# Patient Record
Sex: FEMALE | Race: WHITE | NOT HISPANIC OR LATINO | Employment: OTHER | URBAN - METROPOLITAN AREA
[De-identification: names, ages, dates, MRNs, and addresses within clinical notes are randomized per-mention and may not be internally consistent; named-entity substitution may affect disease eponyms.]

---

## 2021-08-06 ENCOUNTER — APPOINTMENT (OUTPATIENT)
Dept: RADIOLOGY | Age: 61
End: 2021-08-06
Attending: PHYSICIAN ASSISTANT
Payer: COMMERCIAL

## 2021-08-06 ENCOUNTER — OFFICE VISIT (OUTPATIENT)
Dept: URGENT CARE | Age: 61
End: 2021-08-06
Payer: COMMERCIAL

## 2021-08-06 VITALS
WEIGHT: 230 LBS | HEIGHT: 66 IN | DIASTOLIC BLOOD PRESSURE: 88 MMHG | TEMPERATURE: 98.2 F | HEART RATE: 70 BPM | OXYGEN SATURATION: 99 % | SYSTOLIC BLOOD PRESSURE: 172 MMHG | BODY MASS INDEX: 36.96 KG/M2 | RESPIRATION RATE: 18 BRPM

## 2021-08-06 DIAGNOSIS — S86.911A KNEE STRAIN, RIGHT, INITIAL ENCOUNTER: Primary | ICD-10-CM

## 2021-08-06 DIAGNOSIS — R52 PAIN: ICD-10-CM

## 2021-08-06 PROCEDURE — 73564 X-RAY EXAM KNEE 4 OR MORE: CPT

## 2021-08-06 PROCEDURE — 99213 OFFICE O/P EST LOW 20 MIN: CPT | Performed by: PHYSICIAN ASSISTANT

## 2021-08-06 RX ORDER — NAPROXEN 500 MG/1
500 TABLET ORAL 2 TIMES DAILY WITH MEALS
COMMUNITY
End: 2022-01-17

## 2021-08-06 RX ORDER — ATORVASTATIN CALCIUM 20 MG/1
20 TABLET, FILM COATED ORAL DAILY
COMMUNITY
End: 2021-09-29

## 2021-08-06 RX ORDER — VALSARTAN AND HYDROCHLOROTHIAZIDE 80; 12.5 MG/1; MG/1
1 TABLET, FILM COATED ORAL DAILY
COMMUNITY
End: 2022-01-14 | Stop reason: SDUPTHER

## 2021-08-06 RX ORDER — LEVOTHYROXINE SODIUM 175 UG/1
175 TABLET ORAL DAILY
COMMUNITY
End: 2021-12-06 | Stop reason: SDUPTHER

## 2021-08-06 NOTE — PROGRESS NOTES
[facility]        NAME: Sherry Chow is a 57 y o  female  : 1960    MRN: [id number]  DATE: 2021  TIME: 7:44 PM    Assessment and Plan   Knee strain, right, initial encounter [S86 911A]  1  Knee strain, right, initial encounter  XR knee 4+ vw right injury    Ambulatory referral to Orthopedic Surgery         Patient Instructions       Follow up with PCP in 3-5 days  Proceed to  ER if symptoms worsen  Chief Complaint     Chief Complaint   Patient presents with    Knee Pain     right knee pain began with fall on     injured again today  History of Present Illness        Patient for evaluation of pain swelling in her right knee  Patient states that on  she was coming down the steps and her knee buckled  She did not fall on the knee  She states that was little bit painful but started to improve  Then she came down hard on her heel during her knee causing more pain  Review of Systems   Review of Systems   Constitutional: Negative  Musculoskeletal: Positive for arthralgias, gait problem and joint swelling  Skin: Negative            Current Medications       Current Outpatient Medications:     atorvastatin (LIPITOR) 20 mg tablet, Take 20 mg by mouth daily, Disp: , Rfl:     levothyroxine 175 mcg tablet, Take 175 mcg by mouth daily, Disp: , Rfl:     naproxen (NAPROSYN) 500 mg tablet, Take 500 mg by mouth 2 (two) times a day with meals, Disp: , Rfl:     valsartan-hydrochlorothiazide (DIOVAN-HCT) 80-12 5 MG per tablet, Take 1 tablet by mouth daily, Disp: , Rfl:     Current Allergies     Allergies as of 2021    (Not on File)            The following portions of the patient's history were reviewed and updated as appropriate: allergies, current medications, past family history, past medical history, past social history, past surgical history and problem list      Past Medical History:   Diagnosis Date    Arthritis     Cancer ([facility] [address] )     Disease of thyroid gland     Hypertension        Past Surgical History:   Procedure Laterality Date    BREAST LUMPECTOMY      VEIN SURGERY         No family history on file  Medications have been verified  Objective   BP (!) 172/88   Pulse 70   Temp 98 2 °F (36 8 °C)   Resp 18   Ht 5' 6" (1 676 m)   Wt 104 kg (230 lb)   SpO2 99%   BMI 37 12 kg/m²   No LMP recorded  Physical Exam     Physical Exam  Vitals and nursing note reviewed  Constitutional:       General: She is not in acute distress  Appearance: Normal appearance  She is well-developed  She is not ill-appearing, toxic-appearing or diaphoretic  HENT:      Head: Normocephalic and atraumatic  Eyes:      Extraocular Movements: Extraocular movements intact  Conjunctiva/sclera: Conjunctivae normal       Pupils: Pupils are equal, round, and reactive to light  Musculoskeletal:      Comments: Range of motion of the right knee intact but limited  Patient has tenderness and swelling along the medial aspect of the knee  No ecchymosis  No erythema  No warmth  No laxity  Patient does have pain with stress of the MCL  Negative anterior posterior drawer sign  Skin:     General: Skin is warm and dry  Findings: No rash  Neurological:      General: No focal deficit present  Mental Status: She is alert and oriented to person, place, and time  Psychiatric:         Mood and Affect: Mood normal          Behavior: Behavior normal          Thought Content:  Thought content normal          Judgment: Judgment normal          X-ray shows no acute fractures or findings

## 2021-08-06 NOTE — PATIENT INSTRUCTIONS
Rest injured body part  Ice 10-15 minutes off and on every 3-4 hours while awake for 48 hours after injury  After 48 hours you may start using warm compresses if appropriate  Compression use Ace wrap or compressive sleeve for support as needed  DO NOT wear to bed  Elevate injured body part as able to help decrease pain and swelling  Follow-up with orthopedics for further evaluation    [phone]

## 2021-09-29 ENCOUNTER — OFFICE VISIT (OUTPATIENT)
Dept: FAMILY MEDICINE CLINIC | Facility: CLINIC | Age: 61
End: 2021-09-29
Payer: COMMERCIAL

## 2021-09-29 VITALS
HEIGHT: 66 IN | HEART RATE: 76 BPM | WEIGHT: 240 LBS | DIASTOLIC BLOOD PRESSURE: 80 MMHG | BODY MASS INDEX: 38.57 KG/M2 | TEMPERATURE: 98.4 F | SYSTOLIC BLOOD PRESSURE: 132 MMHG

## 2021-09-29 DIAGNOSIS — E78.00 HYPERCHOLESTEREMIA: ICD-10-CM

## 2021-09-29 DIAGNOSIS — M25.561 CHRONIC PAIN OF BOTH KNEES: ICD-10-CM

## 2021-09-29 DIAGNOSIS — Z00.00 ANNUAL PHYSICAL EXAM: Primary | ICD-10-CM

## 2021-09-29 DIAGNOSIS — Z11.59 ENCOUNTER FOR HEPATITIS C SCREENING TEST FOR LOW RISK PATIENT: ICD-10-CM

## 2021-09-29 DIAGNOSIS — Z12.11 COLON CANCER SCREENING: ICD-10-CM

## 2021-09-29 DIAGNOSIS — G89.29 CHRONIC PAIN OF BOTH KNEES: ICD-10-CM

## 2021-09-29 DIAGNOSIS — M25.562 CHRONIC PAIN OF BOTH KNEES: ICD-10-CM

## 2021-09-29 DIAGNOSIS — R73.03 PREDIABETES: ICD-10-CM

## 2021-09-29 DIAGNOSIS — N39.41 URGE INCONTINENCE: ICD-10-CM

## 2021-09-29 DIAGNOSIS — R92.2 DENSE BREASTS: ICD-10-CM

## 2021-09-29 DIAGNOSIS — I99.9 VASCULAR DISEASE: ICD-10-CM

## 2021-09-29 DIAGNOSIS — Z85.850 HISTORY OF THYROID CANCER: ICD-10-CM

## 2021-09-29 DIAGNOSIS — J30.1 SEASONAL ALLERGIC RHINITIS DUE TO POLLEN: ICD-10-CM

## 2021-09-29 DIAGNOSIS — Z12.31 ENCOUNTER FOR SCREENING MAMMOGRAM FOR MALIGNANT NEOPLASM OF BREAST: ICD-10-CM

## 2021-09-29 DIAGNOSIS — G62.9 NEUROPATHY: ICD-10-CM

## 2021-09-29 DIAGNOSIS — E89.0 POSTOPERATIVE HYPOTHYROIDISM: ICD-10-CM

## 2021-09-29 DIAGNOSIS — M12.9 ARTHROPATHY, MULTIPLE SITES: ICD-10-CM

## 2021-09-29 DIAGNOSIS — I10 ESSENTIAL HYPERTENSION: ICD-10-CM

## 2021-09-29 PROBLEM — R51.9 HEADACHE: Status: ACTIVE | Noted: 2018-11-01

## 2021-09-29 PROBLEM — R51.9 HEADACHE: Status: RESOLVED | Noted: 2018-11-01 | Resolved: 2021-09-29

## 2021-09-29 PROCEDURE — 3725F SCREEN DEPRESSION PERFORMED: CPT | Performed by: FAMILY MEDICINE

## 2021-09-29 PROCEDURE — 1036F TOBACCO NON-USER: CPT | Performed by: FAMILY MEDICINE

## 2021-09-29 PROCEDURE — 99204 OFFICE O/P NEW MOD 45 MIN: CPT | Performed by: FAMILY MEDICINE

## 2021-09-29 PROCEDURE — 99386 PREV VISIT NEW AGE 40-64: CPT | Performed by: FAMILY MEDICINE

## 2021-09-29 PROCEDURE — 3008F BODY MASS INDEX DOCD: CPT | Performed by: FAMILY MEDICINE

## 2021-09-29 RX ORDER — ATORVASTATIN CALCIUM 10 MG/1
10 TABLET, FILM COATED ORAL DAILY
COMMUNITY
End: 2022-01-14 | Stop reason: SDUPTHER

## 2021-09-29 RX ORDER — CETIRIZINE HYDROCHLORIDE 10 MG/1
TABLET ORAL AS NEEDED
COMMUNITY

## 2021-09-29 RX ORDER — CELECOXIB 200 MG/1
200 CAPSULE ORAL 2 TIMES DAILY
COMMUNITY

## 2021-09-29 RX ORDER — SOLIFENACIN SUCCINATE 5 MG/1
5 TABLET, FILM COATED ORAL DAILY
Qty: 30 TABLET | Refills: 2 | Status: SHIPPED | OUTPATIENT
Start: 2021-09-29 | End: 2021-11-29 | Stop reason: SDUPTHER

## 2021-09-29 RX ORDER — FLUTICASONE PROPIONATE 50 MCG
SPRAY, SUSPENSION (ML) NASAL AS NEEDED
COMMUNITY
End: 2022-04-18 | Stop reason: SDUPTHER

## 2021-09-29 NOTE — PATIENT INSTRUCTIONS

## 2021-09-29 NOTE — ASSESSMENT & PLAN NOTE
Chronic, uncontrolled  Patient uses Celebrex and naproxen   Obtain labs as ordered  Referral to sports medicine for back pain and knee pain

## 2021-09-29 NOTE — PROGRESS NOTES
Wendy Honeycutt 1960 female MRN: [id number]  [address]    Visit to Establish Care: Family Medicine    Assessment/Plan     Postoperative hypothyroidism  Obtain labs  Continue levothyroxine 175mcg QD  Obtain labs as ordered    Allergic rhinitis  Chronic, stable  Continue Flonase and Zyrtec    Hypertension  Chronic, controlled  Continue valsartan-HCTZ 80-12 5mg QD    Arthropathy, multiple sites  Chronic, uncontrolled  Patient uses Celebrex and naproxen   Obtain labs as ordered  Referral to sports medicine for back pain and knee pain    Urge incontinence  Resume Vesicare     Hypercholesteremia  Obtain labs  Continue Lipitor 10mg QD    Chel was seen today for new patient visit and physical exam     Diagnoses and all orders for this visit:    Annual physical exam  -     Lipid Panel with Direct LDL reflex; Future  -     Hemoglobin A1C; Future  -     Comprehensive metabolic panel; Future  -     CBC; Future  -     Microalbumin / creatinine urine ratio; Future  -     UA (URINE) with reflex to Scope; Future  -     TSH, 3rd generation with Free T4 reflex; Future  -     Vitamin B12; Future  -     T4, free; Future    Urge incontinence  -     solifenacin (VESICARE) 5 mg tablet; Take 1 tablet (5 mg total) by mouth daily    Dense breasts  -     US breast right limited (diagnostic); Future  -     US breast left limited (diagnostic); Future    Encounter for screening mammogram for malignant neoplasm of breast    Vascular disease  -     Ambulatory referral to Vascular Surgery; Future  -     VAS lower limb arterial duplex, complete bilateral; Future  -     VAS lower limb venous duplex study, complete bilateral; Future    Colon cancer screening  -     Ambulatory referral to Gastroenterology; Future    History of thyroid cancer  -     Ambulatory referral to Endocrinology; Future    Postoperative hypothyroidism  -     Ambulatory referral to Endocrinology;  Future    Prediabetes  -     Lipid Panel with Direct LDL reflex; Future  -     Hemoglobin A1C; Future  -     Comprehensive metabolic panel; Future  -     CBC; Future  -     Microalbumin / creatinine urine ratio; Future  -     UA (URINE) with reflex to Scope; Future  -     TSH, 3rd generation with Free T4 reflex; Future  -     Vitamin B12; Future  -     T4, free; Future    Hypercholesteremia  -     Lipid Panel with Direct LDL reflex; Future  -     Hemoglobin A1C; Future  -     Comprehensive metabolic panel; Future  -     CBC; Future  -     Microalbumin / creatinine urine ratio; Future  -     UA (URINE) with reflex to Scope; Future  -     TSH, 3rd generation with Free T4 reflex; Future  -     Vitamin B12; Future  -     T4, free; Future    Neuropathy  -     Comprehensive metabolic panel; Future  -     TSH, 3rd generation with Free T4 reflex; Future  -     Vitamin B12; Future    Chronic pain of both knees  -     Ambulatory referral to Orthopedic Surgery; Future    Seasonal allergic rhinitis due to pollen    Arthropathy, multiple sites    Essential hypertension    BMI 38 0-38 9,adult    Encounter for hepatitis C screening test for low risk patient  -     Hepatitis C antibody; Future        In addition to the above, the patient was counseled on general preventative health care subjects, including but not limited to:  - Nutrition, healthy weight, aerobic and weight-bearing exercise  - Mental health, social support, and self care  - Advised of the importance of dental hygiene and routine dental visits   - Patient made aware of  services at the office  Future Appointments   Date Time Provider Mahad Arias   12/29/2021  8:30 AM Cat Cuellar MD [facility]         Cat Cuellar MD  [address]  9/29/2021      Please be aware that this note contains text that was dictated and there may be errors pertaining to "sound-alike" words during the dictation process        SUBJECTIVE    HPI:  Kevan Strickland is a 57 y o  female who presented to establish care with this family medicine practice  She has several concerns:    Vascular disease, has had vascular surgeries on leg previously in [facility] - will need to obtain records  Urge incontinence - previously on Vesicare, interested in resuming this as it was helpful  Chronic medications for BP, cholesterol, thyroid, has been monitored and stable  She has Hx thyroid cancer (thinks papillary/follicular) and was following with endocrinology in [facility]  She has chronic lower back pain and knee pain, and would like a specialist for this  She had colonoscopy 5 years ago and was recommended to return in 5 years  Review of Systems   Constitutional: Negative for activity change, chills and fever  HENT: Negative for congestion, rhinorrhea and sore throat  Eyes: Negative for visual disturbance  Respiratory: Negative for cough, shortness of breath and wheezing  Cardiovascular: Negative for chest pain and palpitations  Gastrointestinal: Negative for abdominal pain, blood in stool, constipation, diarrhea, nausea and vomiting  Genitourinary: Negative for dysuria  Musculoskeletal: Positive for arthralgias and back pain  Negative for myalgias  Skin: Negative for rash  Neurological: Negative for dizziness, weakness and headaches  All other systems reviewed and are negative        Historical Information   Past Medical History:   Diagnosis Date    Arthritis     Cancer ([facility] [address] )     Disease of thyroid gland     Hypertension      Past Surgical History:   Procedure Laterality Date    BREAST LUMPECTOMY      THYROIDECTOMY      VARICOSE VEIN SURGERY Bilateral     VEIN SURGERY       Family History   Problem Relation Age of Onset   Lori Saunders Breast cancer Mother     Heart disease Mother     Heart disease Father     Stroke Father     Kidney disease Father     Breast cancer Sister     Hypertension Sister     Prostate cancer Maternal Grandfather     Diabetes Maternal Aunt  Diabetes Maternal Uncle     Diabetes Paternal Aunt     Breast cancer Cousin      Social History     Socioeconomic History    Marital status: /Civil Union     Spouse name: Not on file    Number of children: Not on file    Years of education: Not on file    Highest education level: Not on file   Occupational History    Not on file   Tobacco Use    Smoking status: Never Smoker    Smokeless tobacco: Never Used   Vaping Use    Vaping Use: Never used   Substance and Sexual Activity    Alcohol use: Yes     Alcohol/week: 1 0 standard drinks     Types: 1 Glasses of wine per week    Drug use: Not Currently    Sexual activity: Not on file   Other Topics Concern    Not on file   Social History Narrative    Not on file     Social Determinants of Health     Financial Resource Strain:     Difficulty of Paying Living Expenses:    Food Insecurity:     Worried About Running Out of Food in the Last Year:     [address] in the Last Year:    Transportation Needs:     Lack of Transportation (Medical):  Lack of Transportation (Non-Medical):    Physical Activity:     Days of Exercise per Week:     Minutes of Exercise per Session:    Stress:     Feeling of Stress :    Social Connections:     Frequency of Communication with Friends and Family:     Frequency of Social Gatherings with Friends and Family:     Attends Hinduism Services:     Active Member of Clubs or Organizations:     Attends Club or Organization Meetings:     Marital Status:    Intimate Partner Violence:     Fear of Current or Ex-Partner:     Emotionally Abused:     Physically Abused:     Sexually Abused:      OB History    No obstetric history on file             Medications:      Current Outpatient Medications:     atorvastatin (LIPITOR) 10 mg tablet, Take 10 mg by mouth daily, Disp: , Rfl:     celecoxib (CeleBREX) 200 mg capsule, Take 200 mg by mouth 2 (two) times a day, Disp: , Rfl:     cetirizine (ZyrTEC) 10 mg tablet, , Disp: , Rfl:     fluticasone (FLONASE) 50 mcg/act nasal spray, , Disp: , Rfl:     levothyroxine 175 mcg tablet, Take 175 mcg by mouth daily, Disp: , Rfl:     naproxen (NAPROSYN) 500 mg tablet, Take 500 mg by mouth 2 (two) times a day with meals, Disp: , Rfl:     valsartan-hydrochlorothiazide (DIOVAN-HCT) 80-12 5 MG per tablet, Take 1 tablet by mouth daily, Disp: , Rfl:     solifenacin (VESICARE) 5 mg tablet, Take 1 tablet (5 mg total) by mouth daily, Disp: 30 tablet, Rfl: 2    Allergies   Allergen Reactions    Sulfa Antibiotics Rash         There is no immunization history on file for this patient  OBJECTIVE  Vitals:   Vitals:    09/29/21 1101   BP: 132/80   Pulse: 76   Temp: 98 4 °F (36 9 °C)   Weight: 109 kg (240 lb)   Height: 5' 6" (1 676 m)     Wt Readings from Last 3 Encounters:   09/29/21 109 kg (240 lb)   08/06/21 104 kg (230 lb)     Body mass index is 38 74 kg/m²  BP Readings from Last 3 Encounters:   09/29/21 132/80   08/06/21 (!) 172/88     No LMP recorded  Physical Exam  Vitals and nursing note reviewed  Constitutional:       General: She is not in acute distress  Appearance: She is well-developed  She is obese  She is not ill-appearing  HENT:      Head: Normocephalic and atraumatic  Right Ear: Tympanic membrane, ear canal and external ear normal       Left Ear: Tympanic membrane, ear canal and external ear normal       Nose: Nose normal       Mouth/Throat:      Pharynx: Uvula midline  No oropharyngeal exudate  Tonsils: No tonsillar exudate  Eyes:      Conjunctiva/sclera: Conjunctivae normal    Neck:      Thyroid: No thyromegaly  Cardiovascular:      Rate and Rhythm: Normal rate and regular rhythm  Heart sounds: Normal heart sounds  No murmur heard  Pulmonary:      Effort: Pulmonary effort is normal  No respiratory distress  Breath sounds: Normal breath sounds  No decreased breath sounds, wheezing, rhonchi or rales     Abdominal:      General: Bowel sounds are normal  There is no distension  Palpations: Abdomen is soft  Tenderness: There is no abdominal tenderness  Musculoskeletal:      Lumbar back: Tenderness present  Right knee: Tenderness present  Left knee: Tenderness present  Lymphadenopathy:      Cervical: No cervical adenopathy  Skin:     General: Skin is warm and dry  Capillary Refill: Capillary refill takes less than 2 seconds  Neurological:      Mental Status: She is alert and oriented to person, place, and time  Sensory: No sensory deficit  Motor: No abnormal muscle tone  Deep Tendon Reflexes: Reflexes normal         Lab:  I have personally reviewed all pertinent results  Imaging:  I have personally reviewed all pertinent results

## 2021-09-29 NOTE — PROGRESS NOTES
ADULT ANNUAL [address]    NAME: Meena Millan  AGE: 57 y o  SEX: female  : 1960   DATE: 2021     Assessment and Plan:   Immunizations and preventive care screenings were discussed with patient today  Appropriate education was printed on patient's after visit summary  Counseling:  Alcohol/drug use: discussed moderation in alcohol intake, the recommendations for healthy alcohol use, and avoidance of illicit drug use  Dental Health: discussed importance of regular tooth brushing, flossing, and dental visits  Injury prevention: discussed safety/seat belts, safety helmets, smoke detectors, carbon dioxide detectors, and smoking near bedding or upholstery  Sexual health: discussed sexually transmitted diseases, partner selection, use of condoms, avoidance of unintended pregnancy, and contraceptive alternatives  · Exercise: the importance of regular exercise/physical activity was discussed  Recommend exercise 3-5 times per week for at least 30 minutes  BMI Counseling: Body mass index is 38 74 kg/m²  The BMI is above normal  Nutrition recommendations include decreasing portion sizes, encouraging healthy choices of fruits and vegetables and limiting drinks that contain sugar  Exercise recommendations include moderate physical activity 150 minutes/week, exercising 3-5 times per week and strength training exercises  Patient referred to PCP  Rationale for BMI follow-up plan is due to patient being overweight or obese  Depression Screening and Follow-up Plan:   Patient was screened for depression during today's encounter  They screened negative with a PHQ-2 score of 0  BMI Counseling: Body mass index is 38 74 kg/m²  The BMI is above normal  Nutrition recommendations include reducing portion sizes, decreasing overall calorie intake and 3-5 servings of fruits/vegetables daily   Exercise recommendations include moderate aerobic physical activity for 150 minutes/week  Return in about 3 months (around 12/29/2021) for Next scheduled follow up  Chief Complaint:     Chief Complaint   Patient presents with   [facility] Patient Visit    Physical Exam      History of Present Illness:     Adult Annual Physical   Patient here for a comprehensive physical exam  The patient reports problems - see other note  Diet and Physical Activity  · Diet/Nutrition: well balanced diet  · Exercise: no formal exercise  Depression Screening  PHQ-9 Depression Screening    PHQ-9:   Frequency of the following problems over the past two weeks:      Little interest or pleasure in doing things: 0 - not at all  Feeling down, depressed, or hopeless: 0 - not at all  PHQ-2 Score: 0       General Health  · Sleep: sleeps poorly  · Hearing: normal - bilateral   · Vision: wears glasses  · Dental: no dental visits for >1 year  /GYN Health  · Patient is: postmenopausal     Review of Systems:     Review of Systems   Constitutional: Positive for activity change  Negative for chills and fever  HENT: Negative for congestion, rhinorrhea and sore throat  Eyes: Negative for visual disturbance  Respiratory: Negative for cough, shortness of breath and wheezing  Cardiovascular: Positive for leg swelling  Negative for chest pain and palpitations  Gastrointestinal: Negative for abdominal pain, blood in stool, constipation, diarrhea, nausea and vomiting  Genitourinary: Negative for dysuria  Musculoskeletal: Positive for arthralgias  Negative for myalgias  Skin: Negative for rash  Neurological: Positive for numbness  Negative for dizziness and weakness  Psychiatric/Behavioral: Negative for dysphoric mood and sleep disturbance  The patient is nervous/anxious  All other systems reviewed and are negative       Past Medical History:     Past Medical History:   Diagnosis Date    Arthritis     Cancer ([address] )     Disease of thyroid gland     Hypertension       Past Surgical History:     Past Surgical History:   Procedure Laterality Date    BREAST LUMPECTOMY      THYROIDECTOMY      VARICOSE VEIN SURGERY Bilateral     VEIN SURGERY        Social History:     Social History     Socioeconomic History    Marital status: /Civil Union     Spouse name: None    Number of children: None    Years of education: None    Highest education level: None   Occupational History    None   Tobacco Use    Smoking status: Never Smoker    Smokeless tobacco: Never Used   Vaping Use    Vaping Use: Never used   Substance and Sexual Activity    Alcohol use: Yes     Alcohol/week: 1 0 standard drinks     Types: 1 Glasses of wine per week    Drug use: Not Currently    Sexual activity: None   Other Topics Concern    None   Social History Narrative    None     Social Determinants of Health     Financial Resource Strain:     Difficulty of Paying Living Expenses:    Food Insecurity:     Worried About Running Out of Food in the Last Year:     Ran Out of Food in the Last Year:    Transportation Needs:     Lack of Transportation (Medical):      Lack of Transportation (Non-Medical):    Physical Activity:     Days of Exercise per Week:     Minutes of Exercise per Session:    Stress:     Feeling of Stress :    Social Connections:     Frequency of Communication with Friends and Family:     Frequency of Social Gatherings with Friends and Family:     Attends Holiness Services:     Active Member of Clubs or Organizations:     Attends Club or Organization Meetings:     Marital Status:    Intimate Partner Violence:     Fear of Current or Ex-Partner:     Emotionally Abused:     Physically Abused:     Sexually Abused:       Family History:     Family History   Problem Relation Age of Onset    Breast cancer Mother     Heart disease Mother     Heart disease Father     Stroke Father     Kidney disease Father     Breast cancer Sister     Hypertension Sister  Prostate cancer Maternal Grandfather     Diabetes Maternal Aunt     Diabetes Maternal Uncle     Diabetes Paternal Aunt     Breast cancer Cousin       Current Medications:     Current Outpatient Medications   Medication Sig Dispense Refill    atorvastatin (LIPITOR) 10 mg tablet Take 10 mg by mouth daily      celecoxib (CeleBREX) 200 mg capsule Take 200 mg by mouth 2 (two) times a day      cetirizine (ZyrTEC) 10 mg tablet       fluticasone (FLONASE) 50 mcg/act nasal spray       levothyroxine 175 mcg tablet Take 175 mcg by mouth daily      naproxen (NAPROSYN) 500 mg tablet Take 500 mg by mouth 2 (two) times a day with meals      valsartan-hydrochlorothiazide (DIOVAN-HCT) 80-12 5 MG per tablet Take 1 tablet by mouth daily      solifenacin (VESICARE) 5 mg tablet Take 1 tablet (5 mg total) by mouth daily 30 tablet 2     No current facility-administered medications for this visit  Allergies: Allergies   Allergen Reactions    Sulfa Antibiotics Rash      Physical Exam:     /80   Pulse 76   Temp 98 4 °F (36 9 °C)   Ht 5' 6" (1 676 m)   Wt 109 kg (240 lb)   BMI 38 74 kg/m²     Physical Exam  Vitals and nursing note reviewed  Constitutional:       General: She is not in acute distress  Appearance: She is well-developed  She is obese  She is not ill-appearing  HENT:      Head: Normocephalic and atraumatic  Right Ear: Tympanic membrane, ear canal and external ear normal  No middle ear effusion  Left Ear: Tympanic membrane, ear canal and external ear normal   No middle ear effusion  Nose: Nose normal  No congestion or rhinorrhea  Mouth/Throat:      Lips: Pink  Mouth: Mucous membranes are moist       Pharynx: Oropharynx is clear  Uvula midline  No oropharyngeal exudate  Tonsils: No tonsillar exudate  Eyes:      General: Lids are normal       Extraocular Movements: Extraocular movements intact        Conjunctiva/sclera: Conjunctivae normal       Pupils: Pupils are equal, round, and reactive to light  Neck:      Thyroid: No thyromegaly  Trachea: No tracheal deviation  Cardiovascular:      Rate and Rhythm: Normal rate and regular rhythm  Pulses: Normal pulses  Heart sounds: Normal heart sounds, S1 normal and S2 normal  No murmur heard  Pulmonary:      Effort: Pulmonary effort is normal  No respiratory distress  Breath sounds: Normal breath sounds  No decreased breath sounds, wheezing, rhonchi or rales  Abdominal:      General: Bowel sounds are normal  There is no distension  Palpations: Abdomen is soft  Tenderness: There is no abdominal tenderness  Musculoskeletal:      Right lower leg: No edema  Left lower leg: No edema  Lymphadenopathy:      Cervical: No cervical adenopathy  Skin:     General: Skin is warm and dry  Capillary Refill: Capillary refill takes less than 2 seconds  Neurological:      Mental Status: She is alert and oriented to person, place, and time  Cranial Nerves: Cranial nerves are intact  Deep Tendon Reflexes: Reflexes normal       Reflex Scores:       Patellar reflexes are 2+ on the right side and 2+ on the left side  Psychiatric:         Attention and Perception: Attention normal          Mood and Affect: Mood normal          Thought Content: Thought content does not include suicidal ideation          Glendy Samson MD  [facility]

## 2021-10-08 LAB
ALBUMIN SERPL-MCNC: 4.5 G/DL (ref 3.8–4.8)
ALBUMIN/CREAT UR: 12 MG/G CREAT (ref 0–29)
ALBUMIN/GLOB SERPL: 1.7 {RATIO} (ref 1.2–2.2)
ALP SERPL-CCNC: 113 IU/L (ref 44–121)
ALT SERPL-CCNC: 21 IU/L (ref 0–32)
APPEARANCE UR: CLEAR
AST SERPL-CCNC: 22 IU/L (ref 0–40)
BACTERIA URNS QL MICRO: ABNORMAL
BASOPHILS # BLD AUTO: 0.1 X10E3/UL (ref 0–0.2)
BASOPHILS NFR BLD AUTO: 2 %
BILIRUB SERPL-MCNC: 0.3 MG/DL (ref 0–1.2)
BILIRUB UR QL STRIP: NEGATIVE
BUN SERPL-MCNC: 17 MG/DL (ref 8–27)
BUN/CREAT SERPL: 21 (ref 12–28)
CALCIUM SERPL-MCNC: 9.7 MG/DL (ref 8.7–10.3)
CASTS URNS QL MICRO: ABNORMAL /LPF
CHLORIDE SERPL-SCNC: 106 MMOL/L (ref 96–106)
CHOLEST SERPL-MCNC: 157 MG/DL (ref 100–199)
CO2 SERPL-SCNC: 26 MMOL/L (ref 20–29)
COLOR UR: YELLOW
CREAT SERPL-MCNC: 0.82 MG/DL (ref 0.57–1)
CREAT UR-MCNC: 124.9 MG/DL
EOSINOPHIL # BLD AUTO: 0.2 X10E3/UL (ref 0–0.4)
EOSINOPHIL NFR BLD AUTO: 3 %
EPI CELLS #/AREA URNS HPF: ABNORMAL /HPF (ref 0–10)
ERYTHROCYTE [DISTWIDTH] IN BLOOD BY AUTOMATED COUNT: 13.1 % (ref 11.7–15.4)
GLOBULIN SER-MCNC: 2.7 G/DL (ref 1.5–4.5)
GLUCOSE SERPL-MCNC: 97 MG/DL (ref 65–99)
GLUCOSE UR QL: NEGATIVE
HBA1C MFR BLD: 5.8 % (ref 4.8–5.6)
HCT VFR BLD AUTO: 41.3 % (ref 34–46.6)
HDLC SERPL-MCNC: 50 MG/DL
HGB BLD-MCNC: 13.9 G/DL (ref 11.1–15.9)
HGB UR QL STRIP: NEGATIVE
IMM GRANULOCYTES # BLD: 0 X10E3/UL (ref 0–0.1)
IMM GRANULOCYTES NFR BLD: 0 %
KETONES UR QL STRIP: NEGATIVE
LDLC SERPL CALC-MCNC: 84 MG/DL (ref 0–99)
LEUKOCYTE ESTERASE UR QL STRIP: ABNORMAL
LYMPHOCYTES # BLD AUTO: 2.2 X10E3/UL (ref 0.7–3.1)
LYMPHOCYTES NFR BLD AUTO: 34 %
MCH RBC QN AUTO: 29.6 PG (ref 26.6–33)
MCHC RBC AUTO-ENTMCNC: 33.7 G/DL (ref 31.5–35.7)
MCV RBC AUTO: 88 FL (ref 79–97)
MICRO URNS: ABNORMAL
MICROALBUMIN UR-MCNC: 14.4 UG/ML
MONOCYTES # BLD AUTO: 0.4 X10E3/UL (ref 0.1–0.9)
MONOCYTES NFR BLD AUTO: 6 %
NEUTROPHILS # BLD AUTO: 3.6 X10E3/UL (ref 1.4–7)
NEUTROPHILS NFR BLD AUTO: 55 %
NITRITE UR QL STRIP: NEGATIVE
PH UR STRIP: 7.5 [PH] (ref 5–7.5)
PLATELET # BLD AUTO: 238 X10E3/UL (ref 150–450)
POTASSIUM SERPL-SCNC: 4.4 MMOL/L (ref 3.5–5.2)
PROT SERPL-MCNC: 7.2 G/DL (ref 6–8.5)
PROT UR QL STRIP: NEGATIVE
RBC # BLD AUTO: 4.7 X10E6/UL (ref 3.77–5.28)
RBC #/AREA URNS HPF: ABNORMAL /HPF (ref 0–2)
SL AMB EGFR AFRICAN AMERICAN: 89 ML/MIN/1.73
SL AMB EGFR NON AFRICAN AMERICAN: 77 ML/MIN/1.73
SODIUM SERPL-SCNC: 143 MMOL/L (ref 134–144)
SP GR UR: 1.02 (ref 1–1.03)
T4 FREE SERPL-MCNC: 1.16 NG/DL (ref 0.82–1.77)
TRIGL SERPL-MCNC: 132 MG/DL (ref 0–149)
TSH SERPL DL<=0.005 MIU/L-ACNC: 6.82 UIU/ML (ref 0.45–4.5)
UROBILINOGEN UR STRIP-ACNC: 0.2 MG/DL (ref 0.2–1)
VIT B12 SERPL-MCNC: 845 PG/ML (ref 232–1245)
WBC # BLD AUTO: 6.5 X10E3/UL (ref 3.4–10.8)
WBC #/AREA URNS HPF: ABNORMAL /HPF (ref 0–5)

## 2021-11-01 ENCOUNTER — OFFICE VISIT (OUTPATIENT)
Dept: OBGYN CLINIC | Facility: CLINIC | Age: 61
End: 2021-11-01
Payer: COMMERCIAL

## 2021-11-01 VITALS
DIASTOLIC BLOOD PRESSURE: 85 MMHG | HEIGHT: 66 IN | SYSTOLIC BLOOD PRESSURE: 160 MMHG | WEIGHT: 240 LBS | BODY MASS INDEX: 38.57 KG/M2 | HEART RATE: 80 BPM

## 2021-11-01 DIAGNOSIS — M25.561 CHRONIC PAIN OF BOTH KNEES: ICD-10-CM

## 2021-11-01 DIAGNOSIS — M17.0 PRIMARY OSTEOARTHRITIS OF BOTH KNEES: Primary | ICD-10-CM

## 2021-11-01 DIAGNOSIS — M25.562 CHRONIC PAIN OF BOTH KNEES: ICD-10-CM

## 2021-11-01 DIAGNOSIS — G89.29 CHRONIC PAIN OF BOTH KNEES: ICD-10-CM

## 2021-11-01 PROCEDURE — 20610 DRAIN/INJ JOINT/BURSA W/O US: CPT | Performed by: PHYSICAL MEDICINE & REHABILITATION

## 2021-11-01 PROCEDURE — 99204 OFFICE O/P NEW MOD 45 MIN: CPT | Performed by: PHYSICAL MEDICINE & REHABILITATION

## 2021-11-01 RX ORDER — LIDOCAINE HYDROCHLORIDE 10 MG/ML
3 INJECTION, SOLUTION INFILTRATION; PERINEURAL
Status: COMPLETED | OUTPATIENT
Start: 2021-11-01 | End: 2021-11-01

## 2021-11-01 RX ORDER — TRIAMCINOLONE ACETONIDE 40 MG/ML
40 INJECTION, SUSPENSION INTRA-ARTICULAR; INTRAMUSCULAR
Status: COMPLETED | OUTPATIENT
Start: 2021-11-01 | End: 2021-11-01

## 2021-11-01 RX ADMIN — TRIAMCINOLONE ACETONIDE 40 MG: 40 INJECTION, SUSPENSION INTRA-ARTICULAR; INTRAMUSCULAR at 13:22

## 2021-11-01 RX ADMIN — LIDOCAINE HYDROCHLORIDE 3 ML: 10 INJECTION, SOLUTION INFILTRATION; PERINEURAL at 13:22

## 2021-11-08 ENCOUNTER — EVALUATION (OUTPATIENT)
Dept: PHYSICAL THERAPY | Age: 61
End: 2021-11-08
Payer: COMMERCIAL

## 2021-11-08 DIAGNOSIS — M17.0 PRIMARY OSTEOARTHRITIS OF BOTH KNEES: ICD-10-CM

## 2021-11-08 DIAGNOSIS — M25.561 CHRONIC PAIN OF BOTH KNEES: Primary | ICD-10-CM

## 2021-11-08 DIAGNOSIS — M25.562 CHRONIC PAIN OF BOTH KNEES: Primary | ICD-10-CM

## 2021-11-08 DIAGNOSIS — G89.29 CHRONIC PAIN OF BOTH KNEES: Primary | ICD-10-CM

## 2021-11-08 PROCEDURE — 97110 THERAPEUTIC EXERCISES: CPT | Performed by: PHYSICAL THERAPIST

## 2021-11-08 PROCEDURE — 97161 PT EVAL LOW COMPLEX 20 MIN: CPT | Performed by: PHYSICAL THERAPIST

## 2021-11-22 ENCOUNTER — OFFICE VISIT (OUTPATIENT)
Dept: OBGYN CLINIC | Facility: CLINIC | Age: 61
End: 2021-11-22
Payer: COMMERCIAL

## 2021-11-22 VITALS
HEIGHT: 66 IN | WEIGHT: 241 LBS | BODY MASS INDEX: 38.73 KG/M2 | DIASTOLIC BLOOD PRESSURE: 78 MMHG | SYSTOLIC BLOOD PRESSURE: 132 MMHG

## 2021-11-22 DIAGNOSIS — N95.0 POSTMENOPAUSAL BLEEDING: Primary | ICD-10-CM

## 2021-11-22 PROCEDURE — 1036F TOBACCO NON-USER: CPT | Performed by: OBSTETRICS & GYNECOLOGY

## 2021-11-22 PROCEDURE — 99204 OFFICE O/P NEW MOD 45 MIN: CPT | Performed by: OBSTETRICS & GYNECOLOGY

## 2021-11-22 PROCEDURE — 3008F BODY MASS INDEX DOCD: CPT | Performed by: OBSTETRICS & GYNECOLOGY

## 2021-11-22 PROCEDURE — 58100 BIOPSY OF UTERUS LINING: CPT | Performed by: OBSTETRICS & GYNECOLOGY

## 2021-11-23 PROBLEM — N95.0 POSTMENOPAUSAL BLEEDING: Status: ACTIVE | Noted: 2021-11-23

## 2021-11-26 ENCOUNTER — HOSPITAL ENCOUNTER (OUTPATIENT)
Dept: RADIOLOGY | Age: 61
Discharge: HOME | End: 2021-11-26
Payer: COMMERCIAL

## 2021-11-26 DIAGNOSIS — N95.0 POSTMENOPAUSAL BLEEDING: ICD-10-CM

## 2021-11-26 PROCEDURE — 76830 TRANSVAGINAL US NON-OB: CPT

## 2021-11-26 PROCEDURE — 76856 US EXAM PELVIC COMPLETE: CPT

## 2021-11-29 DIAGNOSIS — N39.41 URGE INCONTINENCE: ICD-10-CM

## 2021-11-29 RX ORDER — SOLIFENACIN SUCCINATE 5 MG/1
5 TABLET, FILM COATED ORAL DAILY
Qty: 30 TABLET | Refills: 0 | Status: SHIPPED | OUTPATIENT
Start: 2021-11-29 | End: 2022-01-14 | Stop reason: SDUPTHER

## 2021-12-05 DIAGNOSIS — E89.0 POSTOPERATIVE HYPOTHYROIDISM: Primary | ICD-10-CM

## 2021-12-06 ENCOUNTER — HOSPITAL ENCOUNTER (OUTPATIENT)
Dept: ULTRASOUND IMAGING | Facility: CLINIC | Age: 61
Discharge: HOME | End: 2021-12-06
Payer: COMMERCIAL

## 2021-12-06 ENCOUNTER — TELEPHONE (OUTPATIENT)
Dept: OBGYN CLINIC | Facility: CLINIC | Age: 61
End: 2021-12-06

## 2021-12-06 ENCOUNTER — PATIENT MESSAGE (OUTPATIENT)
Dept: FAMILY MEDICINE CLINIC | Facility: CLINIC | Age: 61
End: 2021-12-06

## 2021-12-06 DIAGNOSIS — E89.0 POSTOPERATIVE HYPOTHYROIDISM: Primary | ICD-10-CM

## 2021-12-06 DIAGNOSIS — R92.2 DENSE BREASTS: ICD-10-CM

## 2021-12-06 PROCEDURE — 76642 ULTRASOUND BREAST LIMITED: CPT

## 2021-12-06 RX ORDER — LEVOTHYROXINE SODIUM 175 UG/1
175 TABLET ORAL DAILY
Qty: 30 TABLET | Refills: 0 | Status: SHIPPED | OUTPATIENT
Start: 2021-12-06 | End: 2021-12-08

## 2021-12-08 RX ORDER — LEVOTHYROXINE SODIUM 175 MCG
175 TABLET ORAL
Qty: 30 TABLET | Refills: 2 | Status: SHIPPED | OUTPATIENT
Start: 2021-12-08 | End: 2022-01-14 | Stop reason: DRUGHIGH

## 2021-12-22 ENCOUNTER — OFFICE VISIT (OUTPATIENT)
Dept: OBGYN CLINIC | Facility: CLINIC | Age: 61
End: 2021-12-22

## 2021-12-22 VITALS — WEIGHT: 242.2 LBS | SYSTOLIC BLOOD PRESSURE: 138 MMHG | BODY MASS INDEX: 39.69 KG/M2 | DIASTOLIC BLOOD PRESSURE: 76 MMHG

## 2021-12-22 DIAGNOSIS — N84.0 ABNORMAL UTERINE BLEEDING DUE TO ENDOMETRIAL POLYP: ICD-10-CM

## 2021-12-22 DIAGNOSIS — N95.0 POSTMENOPAUSAL BLEEDING: Primary | ICD-10-CM

## 2021-12-22 DIAGNOSIS — N93.9 ABNORMAL UTERINE BLEEDING DUE TO ENDOMETRIAL POLYP: ICD-10-CM

## 2021-12-22 PROCEDURE — PREOP: Performed by: OBSTETRICS & GYNECOLOGY

## 2022-01-04 ENCOUNTER — TELEPHONE (OUTPATIENT)
Dept: OBGYN CLINIC | Facility: CLINIC | Age: 62
End: 2022-01-04

## 2022-01-04 NOTE — TELEPHONE ENCOUNTER
Called pts insurance to see if pt needs prior auth for surgery on 03/07/22   Using code  (D+C)             NO PA REQUIRED   Call Ref # [id number]

## 2022-01-12 ENCOUNTER — TELEPHONE (OUTPATIENT)
Dept: OBGYN CLINIC | Facility: CLINIC | Age: 62
End: 2022-01-12

## 2022-01-12 NOTE — TELEPHONE ENCOUNTER
She should be ok to have surgery next week especially if she has finished her isolation/quarantine time and is no longer having fevers and extensive symptoms

## 2022-01-14 ENCOUNTER — NEW PATIENT (OUTPATIENT)
Dept: URBAN - METROPOLITAN AREA CLINIC 6 | Facility: CLINIC | Age: 62
End: 2022-01-14

## 2022-01-14 ENCOUNTER — OFFICE VISIT (OUTPATIENT)
Dept: FAMILY MEDICINE CLINIC | Facility: CLINIC | Age: 62
End: 2022-01-14
Payer: COMMERCIAL

## 2022-01-14 VITALS
TEMPERATURE: 97.3 F | SYSTOLIC BLOOD PRESSURE: 128 MMHG | BODY MASS INDEX: 39.29 KG/M2 | WEIGHT: 235.8 LBS | HEART RATE: 83 BPM | OXYGEN SATURATION: 98 % | HEIGHT: 65 IN | DIASTOLIC BLOOD PRESSURE: 80 MMHG

## 2022-01-14 DIAGNOSIS — I10 ESSENTIAL HYPERTENSION: ICD-10-CM

## 2022-01-14 DIAGNOSIS — H35.373: ICD-10-CM

## 2022-01-14 DIAGNOSIS — E89.0 POSTOPERATIVE HYPOTHYROIDISM: ICD-10-CM

## 2022-01-14 DIAGNOSIS — H25.13: ICD-10-CM

## 2022-01-14 DIAGNOSIS — E78.00 HYPERCHOLESTEREMIA: ICD-10-CM

## 2022-01-14 DIAGNOSIS — N60.09 CYST OF BREAST, UNSPECIFIED LATERALITY: ICD-10-CM

## 2022-01-14 DIAGNOSIS — N39.41 URGE INCONTINENCE: ICD-10-CM

## 2022-01-14 DIAGNOSIS — Z12.31 ENCOUNTER FOR SCREENING MAMMOGRAM FOR MALIGNANT NEOPLASM OF BREAST: ICD-10-CM

## 2022-01-14 DIAGNOSIS — Z85.850 HISTORY OF THYROID CANCER: Primary | ICD-10-CM

## 2022-01-14 DIAGNOSIS — H43.391 VITREOUS FLOATERS OF RIGHT EYE: ICD-10-CM

## 2022-01-14 DIAGNOSIS — H52.13: ICD-10-CM

## 2022-01-14 DIAGNOSIS — N95.0 POSTMENOPAUSAL BLEEDING: ICD-10-CM

## 2022-01-14 PROCEDURE — 92250 FUNDUS PHOTOGRAPHY W/I&R: CPT

## 2022-01-14 PROCEDURE — 3074F SYST BP LT 130 MM HG: CPT | Performed by: FAMILY MEDICINE

## 2022-01-14 PROCEDURE — 99214 OFFICE O/P EST MOD 30 MIN: CPT | Performed by: FAMILY MEDICINE

## 2022-01-14 PROCEDURE — 3079F DIAST BP 80-89 MM HG: CPT | Performed by: FAMILY MEDICINE

## 2022-01-14 PROCEDURE — 92004 COMPRE OPH EXAM NEW PT 1/>: CPT

## 2022-01-14 PROCEDURE — 1036F TOBACCO NON-USER: CPT | Performed by: FAMILY MEDICINE

## 2022-01-14 PROCEDURE — 3008F BODY MASS INDEX DOCD: CPT | Performed by: FAMILY MEDICINE

## 2022-01-14 RX ORDER — LEVOTHYROXINE SODIUM 200 MCG
200 TABLET ORAL
Qty: 90 TABLET | Refills: 1 | Status: SHIPPED | OUTPATIENT
Start: 2022-01-14 | End: 2022-07-08

## 2022-01-14 RX ORDER — VALSARTAN AND HYDROCHLOROTHIAZIDE 80; 12.5 MG/1; MG/1
1 TABLET, FILM COATED ORAL DAILY
Qty: 90 TABLET | Refills: 1 | Status: SHIPPED | OUTPATIENT
Start: 2022-01-14 | End: 2022-07-11 | Stop reason: SDUPTHER

## 2022-01-14 RX ORDER — ATORVASTATIN CALCIUM 10 MG/1
10 TABLET, FILM COATED ORAL DAILY
Qty: 90 TABLET | Refills: 1 | Status: SHIPPED | OUTPATIENT
Start: 2022-01-14 | End: 2022-07-08

## 2022-01-14 RX ORDER — SOLIFENACIN SUCCINATE 5 MG/1
5 TABLET, FILM COATED ORAL DAILY
Qty: 90 TABLET | Refills: 1 | Status: SHIPPED | OUTPATIENT
Start: 2022-01-14 | End: 2022-04-18

## 2022-01-14 ASSESSMENT — VISUAL ACUITY
OU_CC: J1+
OD_CC: 20/25-1
OS_CC: 20/25-1

## 2022-01-14 ASSESSMENT — TONOMETRY
OD_IOP_MMHG: 12
OS_IOP_MMHG: 16

## 2022-01-14 NOTE — ASSESSMENT & PLAN NOTE
Increase levothyroxine to 200mcg QD  Lab Results   Component Value Date    TSH 6 820 (H) 10/05/2021   has upcoming appt with endo

## 2022-01-14 NOTE — PROGRESS NOTES
Waleska Owusu 1960 female MRN: [id number]    Family Medicine Follow-up Visit    Assessment/Plan   Brittani Faria was seen today for follow-up  Diagnoses and all orders for this visit:    History of thyroid cancer  -     US thyroid; Future    Urge incontinence  -     solifenacin (VESICARE) 5 mg tablet; Take 1 tablet (5 mg total) by mouth daily    Encounter for screening mammogram for malignant neoplasm of breast  -     Mammo screening bilateral w 3d & cad; Future    Cyst of breast, unspecified laterality  -     Mammo screening bilateral w 3d & cad; Future    Vitreous floaters of right eye  -     Ambulatory Referral to Ophthalmology; Future    Postoperative hypothyroidism  -     Synthroid 200 MCG tablet; Take 1 tablet (200 mcg total) by mouth daily in the early morning    Hypercholesteremia  -     atorvastatin (LIPITOR) 10 mg tablet; Take 1 tablet (10 mg total) by mouth daily    Essential hypertension  -     valsartan-hydrochlorothiazide (DIOVAN-HCT) 80-12 5 MG per tablet; Take 1 tablet by mouth daily    Referrals provided as previously given for GI, vascular  New referrals for ophthalmology  Keep upcoming endo appt   F/u 3 months     Betina Garcia MD  [address]  1/14/2022      Please be aware that this note contains text that was dictated and there may be errors pertaining to "sound-alike" words during the dictation process  SUBJECTIVE    CC: Follow-up      HPI:  Waleska Owusu is a 57 y o  female who presented for a follow-up of chronic conditions  PMB - going to OR with GYN for D&C and polyp eval      UI - improved on Vesicare, no longer needs pads  No side effects  Thyroid - has been feeling quite tired, difficulty losing weight, low energy  Also due for f/u US for her Hx Cancer  Needs refills for BP and cholesterol  Reports floaters in the last few days in her right eye  No ocular pain       Recent Covid infection at beginning of January - she feels better but still has lingering cough  Review of Systems   Constitutional: Positive for fatigue  Negative for activity change, appetite change, chills, fever and unexpected weight change  HENT: Negative for congestion, rhinorrhea and sore throat  Eyes: Negative for visual disturbance  Floaters    Respiratory: Positive for cough  Negative for shortness of breath and wheezing  Cardiovascular: Negative for chest pain and palpitations  Gastrointestinal: Negative for abdominal pain, blood in stool, constipation, diarrhea, nausea and vomiting  Genitourinary: Positive for vaginal bleeding  Negative for dysuria  Musculoskeletal: Negative for arthralgias and myalgias  Skin: Negative for rash  Neurological: Negative for dizziness, weakness and headaches  Psychiatric/Behavioral: Negative for dysphoric mood  All other systems reviewed and are negative        Historical Information     The following portions of the patient's history were reviewed and updated as appropriate: allergies, current medications, past medical history, past surgical history and problem list     Medications:   Meds/Allergies     Current Outpatient Medications:     atorvastatin (LIPITOR) 10 mg tablet, Take 1 tablet (10 mg total) by mouth daily, Disp: 90 tablet, Rfl: 1    celecoxib (CeleBREX) 200 mg capsule, Take 200 mg by mouth 2 (two) times a day, Disp: , Rfl:     cetirizine (ZyrTEC) 10 mg tablet, , Disp: , Rfl:     fluticasone (FLONASE) 50 mcg/act nasal spray, , Disp: , Rfl:     naproxen (NAPROSYN) 500 mg tablet, Take 500 mg by mouth 2 (two) times a day with meals, Disp: , Rfl:     solifenacin (VESICARE) 5 mg tablet, Take 1 tablet (5 mg total) by mouth daily, Disp: 90 tablet, Rfl: 1    valsartan-hydrochlorothiazide (DIOVAN-HCT) 80-12 5 MG per tablet, Take 1 tablet by mouth daily, Disp: 90 tablet, Rfl: 1    Synthroid 200 MCG tablet, Take 1 tablet (200 mcg total) by mouth daily in the early morning, Disp: 90 tablet, Rfl: 1  Allergies Allergen Reactions    Sulfa Antibiotics Rash       OBJECTIVE    Vitals:   Vitals:    01/14/22 1019   BP: 128/80   Pulse: 83   Temp: (!) 97 3 °F (36 3 °C)   SpO2: 98%   Weight: 107 kg (235 lb 12 8 oz)   Height: 5' 5" (1 651 m)     Wt Readings from Last 3 Encounters:   01/14/22 107 kg (235 lb 12 8 oz)   12/22/21 110 kg (242 lb 3 2 oz)   11/22/21 109 kg (241 lb)     Body mass index is 39 24 kg/m²  BP Readings from Last 3 Encounters:   01/14/22 128/80   12/22/21 138/76   11/22/21 132/78     Pulse Readings from Last 3 Encounters:   01/14/22 83   11/01/21 80   09/29/21 76     No LMP recorded  Patient is postmenopausal     Physical Exam:    Physical Exam  Vitals and nursing note reviewed  Constitutional:       General: She is not in acute distress  Appearance: Normal appearance  She is well-developed  She is not ill-appearing or diaphoretic  HENT:      Head: Normocephalic and atraumatic  Right Ear: External ear normal       Left Ear: External ear normal       Nose: Nose normal    Eyes:      General: Lids are normal       Conjunctiva/sclera: Conjunctivae normal    Neck:      Vascular: No JVD  Trachea: No tracheal deviation  Cardiovascular:      Rate and Rhythm: Normal rate  Pulses: Normal pulses  Pulmonary:      Effort: Pulmonary effort is normal  No accessory muscle usage or respiratory distress  Breath sounds: No wheezing or rhonchi  Skin:     Findings: No rash  Neurological:      Mental Status: She is alert  Labs: I have personally reviewed all pertinent results  Imaging:  I have personally reviewed all pertinent results

## 2022-02-04 ENCOUNTER — FOLLOW UP (OUTPATIENT)
Dept: URBAN - METROPOLITAN AREA CLINIC 6 | Facility: CLINIC | Age: 62
End: 2022-02-04

## 2022-02-04 ENCOUNTER — OFFICE VISIT (OUTPATIENT)
Dept: ENDOCRINOLOGY | Facility: CLINIC | Age: 62
End: 2022-02-04
Payer: COMMERCIAL

## 2022-02-04 VITALS
SYSTOLIC BLOOD PRESSURE: 136 MMHG | BODY MASS INDEX: 40.19 KG/M2 | HEART RATE: 77 BPM | HEIGHT: 65 IN | DIASTOLIC BLOOD PRESSURE: 88 MMHG | WEIGHT: 241.2 LBS

## 2022-02-04 DIAGNOSIS — H25.13: ICD-10-CM

## 2022-02-04 DIAGNOSIS — H35.373: ICD-10-CM

## 2022-02-04 DIAGNOSIS — E89.0 POSTOPERATIVE HYPOTHYROIDISM: ICD-10-CM

## 2022-02-04 DIAGNOSIS — C73 THYROID CANCER (HCC): Primary | ICD-10-CM

## 2022-02-04 DIAGNOSIS — H43.811: ICD-10-CM

## 2022-02-04 PROCEDURE — 99204 OFFICE O/P NEW MOD 45 MIN: CPT | Performed by: INTERNAL MEDICINE

## 2022-02-04 PROCEDURE — 1036F TOBACCO NON-USER: CPT | Performed by: INTERNAL MEDICINE

## 2022-02-04 PROCEDURE — 92012 INTRM OPH EXAM EST PATIENT: CPT

## 2022-02-04 ASSESSMENT — TONOMETRY
OS_IOP_MMHG: 17
OD_IOP_MMHG: 16

## 2022-02-04 ASSESSMENT — VISUAL ACUITY
OS_CC: 20/30+2
OD_CC: 20/25-2

## 2022-02-04 NOTE — PATIENT INSTRUCTIONS
You may receive a survey following today's appointment from [facility]  If you feel that we provided you excellent care for your medical condition, please consider rating us a 9 or 10  If you think you are unable to do so, please contact our office at [phone] so we can address your concern(s)

## 2022-02-04 NOTE — PROGRESS NOTES
Imelda White 57 y o  female MRN: [id number]    Encounter: [id number]      Assessment/Plan     Assessment: This is a 57y o -year-old female with follicular variant papillary thyroid cancer and postoperative hypothyroidism  Plan:  1  Follicular variant papillary thyroid cancer-check thyroglobulin antibodies and thyroglobulin level  We discussed surveillance of her thyroid cancer  At that time, we have agreed that we should do thyroglobulin levels at least once a year  Based on thyroglobulin levels that she is going to have done in a few weeks, we can decide if she needs an ultrasound of the neck  2  Postoperative hypothyroidism-her dose was recently increased  Check TSH and free T4 in about three weeks  3  Voice difficulty-referred to ENT  She may benefit from voice therapy  CC:   Follicular variant papillary thyroid cancer    History of Present Illness      HPI:  [id number] woman with follicular variant papillary thyroid cancer diagnosed in January 2007  She had a thyroid nodule and hyperthyroidism  She received radioactive iodine treatment for hyperthyroidism  The thyroid nodule did not resolve  Subsequently, she underwent a fine-needle aspirate followed by a right hemithyroidectomy  The pathology showed follicular variant papillary thyroid cancer with foci suggestive of lymphovascular invasion  3 months later, she underwent completion thyroidectomy  She did have radioactive iodine treatment in May 2007 with 33 mCi which was for a whole-body scan  On July 9, 2007, she receive a dose of 100 mCi of I 131 for therapy  She does have some voice difficulty  She denies any difficulty swallowing or breathing  For the postoperative hypothyroidism, she is on Synthroid 200 mcg per day  She denies any symptoms of hypo or hyperthyroidism  Review of Systems   Constitutional: Negative for chills and fever  Respiratory: Negative for shortness of breath      Cardiovascular: Negative for chest pain    Gastrointestinal: Negative for constipation, diarrhea, nausea and vomiting  Endocrine: Negative for cold intolerance and heat intolerance  All other systems reviewed and are negative        Historical Information   Past Medical History:   Diagnosis Date    Arthritis     Asthma     BRCA1 negative     BRCA2 negative     Cancer (HCC)     thyroid cancer    Disease of thyroid gland     Fibroid     History of COVID-19 01/04/2022    mild cold s/s    Hyperlipidemia     Hypertension     Hypothyroidism     PONV (postoperative nausea and vomiting)     Seasonal allergies     Varicella      Past Surgical History:   Procedure Laterality Date    BREAST BIOPSY      BREAST CYST EXCISION Right 1991    Benign    THYROIDECTOMY      VARICOSE VEIN SURGERY Bilateral      Social History   Social History     Substance and Sexual Activity   Alcohol Use Yes    Alcohol/week: 1 0 standard drink    Types: 1 Glasses of wine per week    Comment: Only on holidays     Social History     Substance and Sexual Activity   Drug Use Never     Social History     Tobacco Use   Smoking Status Never Smoker   Smokeless Tobacco Never Used     Family History:   Family History   Problem Relation Age of Onset    Breast cancer Mother 48    Heart disease Mother     Heart disease Father     Stroke Father     Kidney disease Father     Breast cancer Sister 48    Hypertension Sister     Prostate cancer Maternal Grandfather     Diabetes Maternal Aunt     Diabetes Maternal Uncle     Diabetes Paternal Aunt     Breast cancer Cousin 39    No Known Problems Daughter     No Known Problems Daughter        Meds/Allergies   Current Outpatient Medications   Medication Sig Dispense Refill    atorvastatin (LIPITOR) 10 mg tablet Take 1 tablet (10 mg total) by mouth daily 90 tablet 1    celecoxib (CeleBREX) 200 mg capsule Take 200 mg by mouth 2 (two) times a day      cetirizine (ZyrTEC) 10 mg tablet as needed        fluticasone (FLONASE) 50 mcg/act nasal spray as needed        solifenacin (VESICARE) 5 mg tablet Take 1 tablet (5 mg total) by mouth daily 90 tablet 1    Synthroid 200 MCG tablet Take 1 tablet (200 mcg total) by mouth daily in the early morning 90 tablet 1    valsartan-hydrochlorothiazide (DIOVAN-HCT) 80-12 5 MG per tablet Take 1 tablet by mouth daily 90 tablet 1     No current facility-administered medications for this visit  Allergies   Allergen Reactions    Sulfa Antibiotics Rash       Objective   Vitals: Blood pressure 136/88, pulse 77, height 5' 5" (1 651 m), weight 109 kg (241 lb 3 2 oz)  Physical Exam  Vitals reviewed  Constitutional:       General: She is not in acute distress  Appearance: She is well-developed  She is obese  She is not diaphoretic  HENT:      Head: Normocephalic and atraumatic  Eyes:      General: Lids are normal  No scleral icterus  Right eye: No discharge  Left eye: No discharge  Conjunctiva/sclera: Conjunctivae normal    Neck:      Thyroid: No thyromegaly  Comments: The thyroid is absent  Cardiovascular:      Rate and Rhythm: Normal rate and regular rhythm  Heart sounds: Normal heart sounds  No murmur heard  No friction rub  No gallop  Pulmonary:      Effort: Pulmonary effort is normal  No respiratory distress  Breath sounds: Normal breath sounds  No wheezing  Chest:   Breasts:      Right: No supraclavicular adenopathy  Left: No supraclavicular adenopathy  Abdominal:      General: Bowel sounds are normal  There is no distension  Palpations: Abdomen is soft  Tenderness: There is no abdominal tenderness  Musculoskeletal:         General: No tenderness or deformity  Normal range of motion  Cervical back: Neck supple  Lymphadenopathy:      Head:      Right side of head: No occipital adenopathy  Left side of head: No occipital adenopathy  Cervical: No cervical adenopathy        Upper Body:      Right upper body: No supraclavicular adenopathy  Left upper body: No supraclavicular adenopathy  Skin:     General: Skin is warm  Findings: No erythema or rash  Neurological:      Mental Status: She is alert and oriented to person, place, and time  Cranial Nerves: No cranial nerve deficit  Psychiatric:         Behavior: Behavior normal          The history was obtained from the review of the chart, patient  Lab Results:   Lab Results   Component Value Date/Time    Free t4 1 16 10/05/2021 09:33 AM       Portions of the record may have been created with voice recognition software  Occasional wrong word or "sound a like" substitutions may have occurred due to the inherent limitations of voice recognition software  Read the chart carefully and recognize, using context, where substitutions have occurred

## 2022-02-10 ENCOUNTER — OFFICE VISIT (OUTPATIENT)
Dept: OBGYN CLINIC | Facility: CLINIC | Age: 62
End: 2022-02-10

## 2022-02-10 ENCOUNTER — TELEPHONE (OUTPATIENT)
Dept: ENDOCRINOLOGY | Facility: CLINIC | Age: 62
End: 2022-02-10

## 2022-02-10 VITALS
DIASTOLIC BLOOD PRESSURE: 82 MMHG | WEIGHT: 238 LBS | HEIGHT: 65 IN | BODY MASS INDEX: 39.65 KG/M2 | SYSTOLIC BLOOD PRESSURE: 152 MMHG

## 2022-02-10 DIAGNOSIS — N84.0 ABNORMAL UTERINE BLEEDING DUE TO ENDOMETRIAL POLYP: ICD-10-CM

## 2022-02-10 DIAGNOSIS — N95.0 POSTMENOPAUSAL BLEEDING: Primary | ICD-10-CM

## 2022-02-10 DIAGNOSIS — N93.9 ABNORMAL UTERINE BLEEDING DUE TO ENDOMETRIAL POLYP: ICD-10-CM

## 2022-02-10 PROCEDURE — 3008F BODY MASS INDEX DOCD: CPT | Performed by: INTERNAL MEDICINE

## 2022-02-10 PROCEDURE — PREOP: Performed by: OBSTETRICS & GYNECOLOGY

## 2022-02-10 RX ORDER — GABAPENTIN 100 MG/1
200 CAPSULE ORAL ONCE
Status: CANCELLED | OUTPATIENT
Start: 2022-02-10 | End: 2022-02-10

## 2022-02-10 RX ORDER — ACETAMINOPHEN 325 MG/1
975 TABLET ORAL ONCE
Status: CANCELLED | OUTPATIENT
Start: 2022-02-10 | End: 2022-02-10

## 2022-02-10 NOTE — PROGRESS NOTES
Assessment/Plan:      Diagnoses and all orders for this visit:    Postmenopausal bleeding  -     Case request operating room: DILATATION AND CURETTAGE (D&C) WITH HYSTEROSCOPY, polypectomy; Standing  -     Case request operating room: DILATATION AND CURETTAGE (D&C) WITH HYSTEROSCOPY, polypectomy    Abnormal uterine bleeding due to endometrial polyp  -     Case request operating room: DILATATION AND CURETTAGE (D&C) WITH HYSTEROSCOPY, polypectomy; Standing  -     Case request operating room: DILATATION AND CURETTAGE (D&C) WITH HYSTEROSCOPY, polypectomy    Discussed risks of the procedure including uterine perforation with possible injury to other organs, fluid overload, inability to complete the procedure as planned, and need for repeat procedure  Discussed resident physician involvement during procedure and examination under anesthesia  Reviewed preoperative instructions including NPO from midnight the night prior with allowance of small sips of clear liquids until 2 hours prior to the procedure and hibiclens wash instructions  Surgical booklet provided for preoperative instructions  Briefly reviewed postoperative expectations and instructions as well as pain management postoperatively  Patient expressed understanding of all that was discussed and all questions were answered to her satisfaction  Consent was obtained  Subjective:    Patient ID: Orquidea Palacios is a 57 y o  female  Chief Complaint   Patient presents with   Kendall Acuña is a 62yo para 3 presenting for preop H&P for her scheduled hysteroscopy, dilation and curettage, and polypectomy for endometrial polyp  She denies any changes to her medical condition  She had a prior procedure performed for a cervical polyp about 12 years ago  She was originally scheduled for early February but unfortunately her procedure was cancelled after she had a positive COVID test and illness   She is fully recovered and denies any issues with SOB, cough, and congestion  She denies fevers, chills, sweats  She denies heavy vaginal bleeding  The following portions of the patient's history were reviewed and updated as appropriate: allergies, current medications, past family history, past medical history, past social history, past surgical history and problem list     Review of Systems   Constitutional: Negative for chills, diaphoresis and fever  Respiratory: Positive for cough  Negative for shortness of breath  Cardiovascular: Negative for chest pain  Gastrointestinal: Negative for abdominal pain, constipation, diarrhea, nausea and vomiting  Genitourinary: Negative for difficulty urinating, dysuria, frequency, menstrual problem, pelvic pain, vaginal bleeding, vaginal discharge and vaginal pain  Musculoskeletal: Negative for back pain  Neurological: Negative for dizziness, weakness and headaches  Objective:  /82 (BP Location: Right arm, Patient Position: Sitting, Cuff Size: Large)   Ht 5' 5" (1 651 m)   Wt 108 kg (238 lb)   BMI 39 61 kg/m²   Physical Exam  Constitutional:       General: She is not in acute distress  Appearance: Normal appearance  She is obese  She is not diaphoretic  HENT:      Head: Normocephalic and atraumatic  Cardiovascular:      Rate and Rhythm: Normal rate and regular rhythm  Heart sounds: No murmur heard  No gallop  Pulmonary:      Effort: Pulmonary effort is normal  No respiratory distress  Breath sounds: Normal breath sounds  No wheezing, rhonchi or rales  Abdominal:      Palpations: Abdomen is soft  There is no mass  Tenderness: There is no abdominal tenderness  There is no guarding  Musculoskeletal:      Right lower leg: No edema  Left lower leg: No edema  Neurological:      Mental Status: She is alert and oriented to person, place, and time  Skin:     General: Skin is warm and dry  Coloration: Skin is not pale     Psychiatric:         Mood and Affect: Mood normal  Behavior: Behavior normal          Thought Content: Thought content normal          Judgment: Judgment normal    Vitals and nursing note reviewed

## 2022-02-10 NOTE — H&P (VIEW-ONLY)
Assessment/Plan:      Diagnoses and all orders for this visit:    Postmenopausal bleeding  -     Case request operating room: DILATATION AND CURETTAGE (D&C) WITH HYSTEROSCOPY, polypectomy; Standing  -     Case request operating room: DILATATION AND CURETTAGE (D&C) WITH HYSTEROSCOPY, polypectomy    Abnormal uterine bleeding due to endometrial polyp  -     Case request operating room: DILATATION AND CURETTAGE (D&C) WITH HYSTEROSCOPY, polypectomy; Standing  -     Case request operating room: DILATATION AND CURETTAGE (D&C) WITH HYSTEROSCOPY, polypectomy    Discussed risks of the procedure including uterine perforation with possible injury to other organs, fluid overload, inability to complete the procedure as planned, and need for repeat procedure  Discussed resident physician involvement during procedure and examination under anesthesia  Reviewed preoperative instructions including NPO from midnight the night prior with allowance of small sips of clear liquids until 2 hours prior to the procedure and hibiclens wash instructions  Surgical booklet provided for preoperative instructions  Briefly reviewed postoperative expectations and instructions as well as pain management postoperatively  Patient expressed understanding of all that was discussed and all questions were answered to her satisfaction  Consent was obtained  Subjective:    Patient ID: Anay Riggins is a 57 y o  female  Chief Complaint   Patient presents with   Pricila Adams is a 62yo para 3 presenting for preop H&P for her scheduled hysteroscopy, dilation and curettage, and polypectomy for endometrial polyp  She denies any changes to her medical condition  She had a prior procedure performed for a cervical polyp about 12 years ago  She was originally scheduled for early February but unfortunately her procedure was cancelled after she had a positive COVID test and illness   She is fully recovered and denies any issues with SOB, cough, and congestion  She denies fevers, chills, sweats  She denies heavy vaginal bleeding  The following portions of the patient's history were reviewed and updated as appropriate: allergies, current medications, past family history, past medical history, past social history, past surgical history and problem list     Review of Systems   Constitutional: Negative for chills, diaphoresis and fever  Respiratory: Positive for cough  Negative for shortness of breath  Cardiovascular: Negative for chest pain  Gastrointestinal: Negative for abdominal pain, constipation, diarrhea, nausea and vomiting  Genitourinary: Negative for difficulty urinating, dysuria, frequency, menstrual problem, pelvic pain, vaginal bleeding, vaginal discharge and vaginal pain  Musculoskeletal: Negative for back pain  Neurological: Negative for dizziness, weakness and headaches  Objective:  /82 (BP Location: Right arm, Patient Position: Sitting, Cuff Size: Large)   Ht 5' 5" (1 651 m)   Wt 108 kg (238 lb)   BMI 39 61 kg/m²   Physical Exam  Constitutional:       General: She is not in acute distress  Appearance: Normal appearance  She is obese  She is not diaphoretic  HENT:      Head: Normocephalic and atraumatic  Cardiovascular:      Rate and Rhythm: Normal rate and regular rhythm  Heart sounds: No murmur heard  No gallop  Pulmonary:      Effort: Pulmonary effort is normal  No respiratory distress  Breath sounds: Normal breath sounds  No wheezing, rhonchi or rales  Abdominal:      Palpations: Abdomen is soft  There is no mass  Tenderness: There is no abdominal tenderness  There is no guarding  Musculoskeletal:      Right lower leg: No edema  Left lower leg: No edema  Neurological:      Mental Status: She is alert and oriented to person, place, and time  Skin:     General: Skin is warm and dry  Coloration: Skin is not pale     Psychiatric:         Mood and Affect: Mood normal  Behavior: Behavior normal          Thought Content: Thought content normal          Judgment: Judgment normal    Vitals and nursing note reviewed

## 2022-03-07 ENCOUNTER — ANESTHESIA EVENT (OUTPATIENT)
Dept: PERIOP | Facility: AMBULARY SURGERY CENTER | Age: 62
End: 2022-03-07
Payer: COMMERCIAL

## 2022-03-07 ENCOUNTER — ANESTHESIA (OUTPATIENT)
Dept: PERIOP | Facility: AMBULARY SURGERY CENTER | Age: 62
End: 2022-03-07
Payer: COMMERCIAL

## 2022-03-07 ENCOUNTER — HOSPITAL ENCOUNTER (OUTPATIENT)
Facility: AMBULARY SURGERY CENTER | Age: 62
Setting detail: OUTPATIENT SURGERY
Discharge: HOME | End: 2022-03-07
Attending: OBSTETRICS & GYNECOLOGY | Admitting: OBSTETRICS & GYNECOLOGY
Payer: COMMERCIAL

## 2022-03-07 VITALS
HEART RATE: 60 BPM | RESPIRATION RATE: 16 BRPM | TEMPERATURE: 97.5 F | OXYGEN SATURATION: 98 % | HEIGHT: 65 IN | BODY MASS INDEX: 39.32 KG/M2 | WEIGHT: 236 LBS | DIASTOLIC BLOOD PRESSURE: 64 MMHG | SYSTOLIC BLOOD PRESSURE: 118 MMHG

## 2022-03-07 DIAGNOSIS — N84.0 ABNORMAL UTERINE BLEEDING DUE TO ENDOMETRIAL POLYP: ICD-10-CM

## 2022-03-07 DIAGNOSIS — N95.0 POSTMENOPAUSAL BLEEDING: ICD-10-CM

## 2022-03-07 DIAGNOSIS — N93.9 ABNORMAL UTERINE BLEEDING DUE TO ENDOMETRIAL POLYP: ICD-10-CM

## 2022-03-07 PROCEDURE — 88305 TISSUE EXAM BY PATHOLOGIST: CPT | Performed by: SPECIALIST

## 2022-03-07 PROCEDURE — 58558 HYSTEROSCOPY BIOPSY: CPT | Performed by: OBSTETRICS & GYNECOLOGY

## 2022-03-07 RX ORDER — IBUPROFEN 600 MG/1
600 TABLET ORAL EVERY 6 HOURS PRN
Status: DISCONTINUED | OUTPATIENT
Start: 2022-03-07 | End: 2022-03-07 | Stop reason: HOSPADM

## 2022-03-07 RX ORDER — MAGNESIUM HYDROXIDE 1200 MG/15ML
LIQUID ORAL AS NEEDED
Status: DISCONTINUED | OUTPATIENT
Start: 2022-03-07 | End: 2022-03-07 | Stop reason: HOSPADM

## 2022-03-07 RX ORDER — ONDANSETRON 2 MG/ML
INJECTION INTRAMUSCULAR; INTRAVENOUS AS NEEDED
Status: DISCONTINUED | OUTPATIENT
Start: 2022-03-07 | End: 2022-03-07

## 2022-03-07 RX ORDER — SODIUM CHLORIDE, SODIUM LACTATE, POTASSIUM CHLORIDE, CALCIUM CHLORIDE 600; 310; 30; 20 MG/100ML; MG/100ML; MG/100ML; MG/100ML
100 INJECTION, SOLUTION INTRAVENOUS CONTINUOUS
Status: DISCONTINUED | OUTPATIENT
Start: 2022-03-07 | End: 2022-03-07 | Stop reason: HOSPADM

## 2022-03-07 RX ORDER — OXYCODONE HYDROCHLORIDE 5 MG/1
5 TABLET ORAL EVERY 4 HOURS PRN
Status: DISCONTINUED | OUTPATIENT
Start: 2022-03-07 | End: 2022-03-07 | Stop reason: HOSPADM

## 2022-03-07 RX ORDER — DEXAMETHASONE SODIUM PHOSPHATE 10 MG/ML
INJECTION, SOLUTION INTRAMUSCULAR; INTRAVENOUS AS NEEDED
Status: DISCONTINUED | OUTPATIENT
Start: 2022-03-07 | End: 2022-03-07

## 2022-03-07 RX ORDER — FENTANYL CITRATE 50 UG/ML
INJECTION, SOLUTION INTRAMUSCULAR; INTRAVENOUS AS NEEDED
Status: DISCONTINUED | OUTPATIENT
Start: 2022-03-07 | End: 2022-03-07

## 2022-03-07 RX ORDER — FENTANYL CITRATE/PF 50 MCG/ML
25 SYRINGE (ML) INJECTION
Status: COMPLETED | OUTPATIENT
Start: 2022-03-07 | End: 2022-03-07

## 2022-03-07 RX ORDER — KETOROLAC TROMETHAMINE 30 MG/ML
INJECTION, SOLUTION INTRAMUSCULAR; INTRAVENOUS AS NEEDED
Status: DISCONTINUED | OUTPATIENT
Start: 2022-03-07 | End: 2022-03-07

## 2022-03-07 RX ORDER — SODIUM CHLORIDE, SODIUM LACTATE, POTASSIUM CHLORIDE, CALCIUM CHLORIDE 600; 310; 30; 20 MG/100ML; MG/100ML; MG/100ML; MG/100ML
INJECTION, SOLUTION INTRAVENOUS CONTINUOUS PRN
Status: DISCONTINUED | OUTPATIENT
Start: 2022-03-07 | End: 2022-03-07

## 2022-03-07 RX ORDER — ACETAMINOPHEN 325 MG/1
975 TABLET ORAL EVERY 6 HOURS PRN
Status: DISCONTINUED | OUTPATIENT
Start: 2022-03-07 | End: 2022-03-07 | Stop reason: HOSPADM

## 2022-03-07 RX ORDER — LIDOCAINE HYDROCHLORIDE 10 MG/ML
INJECTION, SOLUTION EPIDURAL; INFILTRATION; INTRACAUDAL; PERINEURAL AS NEEDED
Status: DISCONTINUED | OUTPATIENT
Start: 2022-03-07 | End: 2022-03-07

## 2022-03-07 RX ORDER — ONDANSETRON 2 MG/ML
4 INJECTION INTRAMUSCULAR; INTRAVENOUS EVERY 6 HOURS PRN
Status: DISCONTINUED | OUTPATIENT
Start: 2022-03-07 | End: 2022-03-07 | Stop reason: HOSPADM

## 2022-03-07 RX ORDER — PROPOFOL 10 MG/ML
INJECTION, EMULSION INTRAVENOUS CONTINUOUS PRN
Status: DISCONTINUED | OUTPATIENT
Start: 2022-03-07 | End: 2022-03-07

## 2022-03-07 RX ORDER — PROPOFOL 10 MG/ML
INJECTION, EMULSION INTRAVENOUS AS NEEDED
Status: DISCONTINUED | OUTPATIENT
Start: 2022-03-07 | End: 2022-03-07

## 2022-03-07 RX ORDER — DOCUSATE SODIUM 100 MG/1
100 CAPSULE, LIQUID FILLED ORAL 2 TIMES DAILY
Status: DISCONTINUED | OUTPATIENT
Start: 2022-03-07 | End: 2022-03-07 | Stop reason: HOSPADM

## 2022-03-07 RX ORDER — ACETAMINOPHEN 325 MG/1
975 TABLET ORAL ONCE
Status: COMPLETED | OUTPATIENT
Start: 2022-03-07 | End: 2022-03-07

## 2022-03-07 RX ORDER — GABAPENTIN 100 MG/1
200 CAPSULE ORAL ONCE
Status: COMPLETED | OUTPATIENT
Start: 2022-03-07 | End: 2022-03-07

## 2022-03-07 RX ORDER — MIDAZOLAM HYDROCHLORIDE 2 MG/2ML
INJECTION, SOLUTION INTRAMUSCULAR; INTRAVENOUS AS NEEDED
Status: DISCONTINUED | OUTPATIENT
Start: 2022-03-07 | End: 2022-03-07

## 2022-03-07 RX ADMIN — FENTANYL CITRATE 25 MCG: 50 INJECTION INTRAMUSCULAR; INTRAVENOUS at 11:40

## 2022-03-07 RX ADMIN — FENTANYL CITRATE 25 MCG: 50 INJECTION, SOLUTION INTRAMUSCULAR; INTRAVENOUS at 10:37

## 2022-03-07 RX ADMIN — ACETAMINOPHEN 975 MG: 325 TABLET, FILM COATED ORAL at 09:53

## 2022-03-07 RX ADMIN — OXYCODONE HYDROCHLORIDE 5 MG: 5 TABLET ORAL at 12:49

## 2022-03-07 RX ADMIN — FENTANYL CITRATE 25 MCG: 50 INJECTION INTRAMUSCULAR; INTRAVENOUS at 11:43

## 2022-03-07 RX ADMIN — FENTANYL CITRATE 25 MCG: 50 INJECTION INTRAMUSCULAR; INTRAVENOUS at 11:46

## 2022-03-07 RX ADMIN — SODIUM CHLORIDE, SODIUM LACTATE, POTASSIUM CHLORIDE, AND CALCIUM CHLORIDE: .6; .31; .03; .02 INJECTION, SOLUTION INTRAVENOUS at 10:25

## 2022-03-07 RX ADMIN — PROPOFOL 150 MCG/KG/MIN: 10 INJECTION, EMULSION INTRAVENOUS at 10:34

## 2022-03-07 RX ADMIN — LIDOCAINE HYDROCHLORIDE 50 MG: 10 INJECTION, SOLUTION EPIDURAL; INFILTRATION; INTRACAUDAL at 10:34

## 2022-03-07 RX ADMIN — DEXAMETHASONE SODIUM PHOSPHATE 10 MG: 10 INJECTION, SOLUTION INTRAMUSCULAR; INTRAVENOUS at 10:39

## 2022-03-07 RX ADMIN — GABAPENTIN 200 MG: 100 CAPSULE ORAL at 09:54

## 2022-03-07 RX ADMIN — FENTANYL CITRATE 25 MCG: 50 INJECTION INTRAMUSCULAR; INTRAVENOUS at 11:50

## 2022-03-07 RX ADMIN — PROPOFOL 200 MG: 10 INJECTION, EMULSION INTRAVENOUS at 10:34

## 2022-03-07 RX ADMIN — KETOROLAC TROMETHAMINE 30 MG: 30 INJECTION, SOLUTION INTRAMUSCULAR at 10:45

## 2022-03-07 RX ADMIN — ONDANSETRON 4 MG: 2 INJECTION INTRAMUSCULAR; INTRAVENOUS at 10:29

## 2022-03-07 RX ADMIN — MIDAZOLAM HYDROCHLORIDE 2 MG: 1 INJECTION, SOLUTION INTRAMUSCULAR; INTRAVENOUS at 10:29

## 2022-03-07 RX ADMIN — FENTANYL CITRATE 25 MCG: 50 INJECTION, SOLUTION INTRAMUSCULAR; INTRAVENOUS at 10:54

## 2022-03-07 NOTE — ANESTHESIA POSTPROCEDURE EVALUATION
Post-Op Assessment Note    CV Status:  Stable  Pain Score: 0    Pain management: adequate     Mental Status:  Alert and awake   Hydration Status:  Euvolemic   PONV Controlled:  Controlled   Airway Patency:  Patent      Post Op Vitals Reviewed: Yes      Staff: CRNA         No complications documented      BP   116/66   Temp  97 7   Pulse  77   Resp   12   SpO2   97%

## 2022-03-07 NOTE — INTERVAL H&P NOTE
H&P reviewed  After examining the patient I find no changes in the patients condition since the H&P had been written      Vitals:    03/07/22 0947   BP: 141/93   Pulse: 72   Resp: 18   Temp: (!) 97 2 °F (36 2 °C)   SpO2: 96%

## 2022-03-07 NOTE — DISCHARGE INSTRUCTIONS
Hysteroscopy   AMBULATORY CARE:   What you need to know about a hysteroscopy:  A hysteroscopy is a procedure to find and treat problems in your uterus  A hysteroscopy may be done to find, and possibly treat, the cause of abnormal vaginal bleeding, problems getting pregnant, or miscarriage  It may also be done to insert or remove a device that prevents pregnancy  How to prepare for a hysteroscopy:   · Your procedure may be done at your healthcare provider's office, an outpatient facility, or a hospital  Arrange for someone to drive you home after your procedure  Your healthcare provider will talk to you about how to prepare  You may be told not to eat or drink anything after midnight on the day of your procedure  · You may need to stop taking blood thinners or aspirin several days before your procedure  This will help decrease your risk for bleeding  Your provider will tell you what medicines to take or not take on the day of your procedure  You may be told to take ibuprofen on the day of your procedure to control pain  You may be given an antibiotic to prevent infection  Tell healthcare providers if you have ever had an allergic reaction to an antibiotic  · Your healthcare provider may put medicine on your cervix before your procedure  This will help open your cervix so the scope can be inserted into your uterus more easily  A scope is a small tube with a light and a camera on the end  What will happen during a hysteroscopy:   · You may be given general anesthesia to keep you asleep and free from pain  You may instead be given medicine to help you relax, and local or spinal anesthesia  With local or spinal anesthesia, you may still feel pressure or pushing, but you should not feel any pain  Your healthcare provider will gently insert a device into your vagina  The device opens the walls of your vagina so your healthcare provider can see your cervix   Tools or medicine will be used to open your cervix  · Your provider will insert the scope through your cervix and into your uterus  Your provider may inject air or fluid to help see inside your uterus more clearly  Tools inserted through the scope may be used to remove scar tissue, growths such as polyps or fibroids, or a sample of tissue  Tools may also be used to control bleeding  · A vaginal pack or sanitary pad may be used to absorb the bleeding  A vaginal pack is a special gauze that is inserted into the vagina  It will be removed before you go home  What will happen after a hysteroscopy:  Healthcare providers will monitor you until you are awake  You may be able to go home, or you may need to spend a night in the hospital  It is normal to have vaginal bleeding and cramps for 2 to 3 days after your procedure  Your bleeding may range from barely staining a pad to soaking a pad every 2 to 3 hours  You may see blood clots on the pad  Call your healthcare provider if you see blood clots that are larger than the size of a quarter  Risks of a hysteroscopy: You may get an infection or bleed more than expected  Scar tissue may form in your uterus  This may make it difficult to get pregnant  You may have an allergic reaction to the fluid injected into your uterus  The fluid may build up and cause problems in your lungs, heart, or brain  This may become life-threatening  The scope or tools may make a hole in your cervix, uterus, bowels, or bladder  This may require more surgery to fix and may become life-threatening  Call 911 if:   · You have trouble breathing  Seek care immediately if:   · You have heavy vaginal bleeding that fills 1 or more sanitary pads in 1 hour  · You have severe pain or bloating in your abdomen  · You feel lightheaded, weak, and confused  · You see blood in your urine  · You stop urinating or urinate less than usual     Contact your healthcare provider if:   · You have a fever or chills      · You have pus or a foul-smelling odor coming from your vagina  · You see blood clots on your sanitary pad that are larger than the size of a quarter  · You have nausea or are vomiting  · Your skin is itchy, swollen, or you have a rash  · You have questions or concerns about your condition or care  Medicines: You may need any of the following:  · Estrogen  helps heal the lining of your uterus  · Antibiotics  help prevent a bacterial infection  · Prescription pain medicine  may be given  Ask your healthcare provider how to take this medicine safely  Some prescription pain medicines contain acetaminophen  Do not take other medicines that contain acetaminophen without talking to your healthcare provider  Too much acetaminophen may cause liver damage  Prescription pain medicine may cause constipation  Ask your healthcare provider how to prevent or treat constipation  · NSAIDs , such as ibuprofen, help decrease swelling, pain, and fever  NSAIDs can cause stomach bleeding or kidney problems in certain people  If you take blood thinner medicine, always ask your healthcare provider if NSAIDs are safe for you  Always read the medicine label and follow directions  · Take your medicine as directed  Contact your healthcare provider if you think your medicine is not helping or if you have side effects  Tell him or her if you are allergic to any medicine  Keep a list of the medicines, vitamins, and herbs you take  Include the amounts, and when and why you take them  Bring the list or the pill bottles to follow-up visits  Carry your medicine list with you in case of an emergency  Activity:  Do not have sex, use tampons, or douche for up to 6 weeks  These actions may cause an infection  Ask your healthcare provider if it is okay to take a tub bath or swim  Rest as needed  Do not drive, return to work, or exercise for 24 hours or as directed  You can return to most activities in 1 to 2 days     Follow up with your doctor as directed:  Write down your questions so you remember to ask them during your visits  © Copyright [employer] 2022 Information is for End User's use only and may not be sold, redistributed or otherwise used for commercial purposes  All illustrations and images included in CareNotes® are the copyrighted property of [employer]  or [employer]  The above information is an  only  It is not intended as medical advice for individual conditions or treatments  Talk to your doctor, nurse or pharmacist before following any medical regimen to see if it is safe and effective for you

## 2022-03-07 NOTE — OP NOTE
OPERATIVE REPORT  PATIENT NAME: Ayla Bernal    :  1960  MRN: [id number]  Pt Location: [facility] OR [facility]    SURGERY DATE: 3/7/2022    Surgeon(s) and Role:     * Joel Sterling MD - Primary     * Rajwinder Reyes MD - Assisting    Preop Diagnosis:  Postmenopausal bleeding [N95 0]  Abnormal uterine bleeding due to endometrial polyp [N93 9, N84 0]    Post-Op Diagnosis Codes:     * Postmenopausal bleeding [N95 0]     * Abnormal uterine bleeding due to endometrial polyp [N93 9, N84 0]    Procedure(s) (LRB):  DILATATION AND CURETTAGE (D&C) WITH HYSTEROSCOPY, polypectomy (N/A)    Specimen(s):  ID Type Source Tests Collected by Time Destination   1 : ENDOMETRIAL POLYP Tissue Polyp, Cervical/Endometrial TISSUE EXAM Joel Sterling MD 3/7/2022 1056    2 : ENDOMETRIAL CURRETTINGS Tissue Endometrium TISSUE EXAM Joel Sterling MD 3/7/2022 1056        Estimated Blood Loss:   Minimal    Drains:  * No LDAs found *    Anesthesia Type:   General laryngeal mask    Operative Indications:  Postmenopausal bleeding [N95 0]  Abnormal uterine bleeding due to endometrial polyp [N93 9, N84 0]      Operative Findings:  1  External genitalia normal  2  Cervix multipara no mass and lesion appreciated  Uterus 9 weeks in size, there was noted to be 2 polyp on left lateral wall, between cornua and lower uterine segment  Bilateral tubal ostia visualized  The polyp were resected via Symphion sytem  Good hemostasis appreciated  Complications:   None    Procedure and Technique:  Patient was taken to the operating room  General LMA anesthesia (LMA) was administered and the patient was positioned on the OR table in the dorsal lithotomy position  All pressure points were padded and a lorenza hugger was placed to maintain control of core body temperature  A bimanual exam was performed and the uterus was noted to be midposition, 9 weeks in size and consistency with no palpable adnexal masses or fullness   The patient was prepped and draped in the usual sterile fashion  A time out was performed to confirm correct patient and correct procedure  A straight catheter was introduced into the bladder, which was drained of 10cc of clear yellow urine  A weighted speculum was inserted into the vagina and a Reid retractor was used to visualize the anterior lip of the cervix, which was then grasped with a single toothed tenaculum  The uterus was sounded to 9cm  The cervix was serially dilated to 24 using Peraza dilators for introduction of the hysteroscope  Hysteroscope was introduced under direct visualization using normal saline solution as the distention media  Hysteroscope was advanced to the uterine fundus and the entire uterine cavity was inspected in a systematic manner  There was noted to be 2 polyp on left lateral wall, between cornua and lower uterine segment  Symphion resectoscope use to remove the polyps  The entire polyps and stalks resected, good hemostasis appreciated  Hysteroscope was withdrawn and sharp curetting was performed, starting at the 12'oclock position and rotating a total of 360 degrees to cover all surfaces  Endometrial tissue was obtained and sent for pathology  The single toothed tenaculum was removed from the anterior lip of the cervix  Good hemostasis was confirmed at the tenaculum puncture sites  Weighted speculum was then removed from the vagina  At the conclusion of the procedure, all needle, sponge, and instrument counts were noted to be correct x2  Dr Marilee Orozco was present and participated in all key portions of the case         I was present for the entire procedure    Patient Disposition:  PACU  and extubated and stable      SIGNATURE: Serge Oro MD  DATE: March 7, 2022  TIME: 11:08 AM

## 2022-03-07 NOTE — ANESTHESIA PREPROCEDURE EVALUATION
Procedure:  DILATATION AND CURETTAGE (D&C) WITH HYSTEROSCOPY, polypectomy (N/A Uterus)    Relevant Problems   CARDIO   (+) Essential hypertension   (+) Hypercholesteremia      ENDO   (+) Postoperative hypothyroidism      NEURO/PSYCH   (+) History of malignant neoplasm of thyroid      Hx PONV  Physical Exam    Airway    Mallampati score: II  TM Distance: >3 FB  Neck ROM: full     Dental       Cardiovascular  Cardiovascular exam normal    Pulmonary  Pulmonary exam normal     Other Findings        Anesthesia Plan  ASA Score- 2     Anesthesia Type- general with ASA Monitors  Additional Monitors:   Airway Plan: ETT  Plan Factors-Exercise tolerance (METS): >4 METS  Chart reviewed  Patient summary reviewed  Patient is not a current smoker  Induction- intravenous  Postoperative Plan-   Planned trial extubation    Informed Consent- Anesthetic plan and risks discussed with patient  I personally reviewed this patient with the CRNA  Discussed and agreed on the Anesthesia Plan with the CRNA  Piter Carpio

## 2022-03-08 LAB
SL AMB THYROGLOBULIN BY LCMS: <0.2 NG/ML (ref 1.5–38.5)
T4 FREE SERPL-MCNC: 1.6 NG/DL (ref 0.82–1.77)
THYROGLOB AB SERPL-ACNC: 7.4 IU/ML (ref 0–0.9)
TSH SERPL DL<=0.005 MIU/L-ACNC: 0.84 UIU/ML (ref 0.45–4.5)

## 2022-03-09 NOTE — RESULT ENCOUNTER NOTE
Thyroid levels are normal   The thyroglobulin antibody is elevated and therefore the thyroglobulin itself is not reliable  Continue to monitor over time  Repeat thyroglobulin and thyroglobulin antibody in six months

## 2022-03-15 ENCOUNTER — PROBLEM (OUTPATIENT)
Dept: URBAN - METROPOLITAN AREA CLINIC 6 | Facility: CLINIC | Age: 62
End: 2022-03-15

## 2022-03-15 DIAGNOSIS — H43.811: ICD-10-CM

## 2022-03-15 DIAGNOSIS — H04.123: ICD-10-CM

## 2022-03-15 DIAGNOSIS — H35.373: ICD-10-CM

## 2022-03-15 DIAGNOSIS — H25.13: ICD-10-CM

## 2022-03-15 PROCEDURE — 92012 INTRM OPH EXAM EST PATIENT: CPT

## 2022-03-15 ASSESSMENT — TONOMETRY
OS_IOP_MMHG: 21
OD_IOP_MMHG: 19

## 2022-03-15 ASSESSMENT — VISUAL ACUITY
OD_CC: 20/25-2
OS_CC: 20/30+2

## 2022-03-21 ENCOUNTER — OFFICE VISIT (OUTPATIENT)
Dept: OBGYN CLINIC | Facility: CLINIC | Age: 62
End: 2022-03-21

## 2022-03-21 VITALS — BODY MASS INDEX: 39.77 KG/M2 | SYSTOLIC BLOOD PRESSURE: 142 MMHG | WEIGHT: 239 LBS | DIASTOLIC BLOOD PRESSURE: 80 MMHG

## 2022-03-21 DIAGNOSIS — Z98.890 STATUS POST DILATION AND CURETTAGE: Primary | ICD-10-CM

## 2022-03-21 DIAGNOSIS — N95.0 POSTMENOPAUSAL BLEEDING: ICD-10-CM

## 2022-03-21 PROCEDURE — 99024 POSTOP FOLLOW-UP VISIT: CPT | Performed by: OBSTETRICS & GYNECOLOGY

## 2022-03-21 NOTE — PROGRESS NOTES
Assessment/Plan:     Diagnoses and all orders for this visit:    Status post dilation and curettage  - recovering well and has no bleeding    - discussed low recurrence risk though patient had a prior D&C occur in 2009  Discussed her inactive endometrium and would not anticipate polyps to recur  If recurrence of postmenopausal bleeding occurs, can consider hysterectomy as this is patient's 2nd D&C for endometrial polyps  Postmenopausal bleeding  - resolved  Subjective:    Patient ID: Lex Santacruz is a 60 y o  female  Chief Complaint   Patient presents with    Post-op     Pt is 2wks post d&c  Pt has no problems  Crow Chapin presents today for her postoperative visit after a hysteroscopy, dilation and curettage with resection of endometrial polyps for indication of postmenopausal bleeding  Her polypoid tissue and endometrial curettings were all benign polyp tissue with inactive endometrium as is expected from her postmenopausal state  She is doing well and has no complaints today  She denies any further vaginal bleeding, fevers/chills/sweats, and pain  The following portions of the patient's history were reviewed and updated as appropriate: allergies, current medications, past family history, past medical history, past social history, past surgical history and problem list     Review of Systems   Constitutional: Negative for chills, diaphoresis and fever  Respiratory: Negative for cough and shortness of breath  Cardiovascular: Negative for chest pain  Gastrointestinal: Negative for abdominal pain, constipation, diarrhea, nausea and vomiting  Genitourinary: Negative for difficulty urinating, dyspareunia, dysuria, menstrual problem, pelvic pain, vaginal bleeding, vaginal discharge and vaginal pain  Neurological: Negative for dizziness, weakness and headaches           Objective:  /80 (BP Location: Right arm, Patient Position: Sitting, Cuff Size: Large)   Wt 108 kg (239 lb)   BMI 39 77 kg/m²   Physical Exam  Constitutional:       General: She is not in acute distress  Appearance: Normal appearance  She is not diaphoretic  HENT:      Head: Normocephalic and atraumatic  Pulmonary:      Effort: Pulmonary effort is normal  No respiratory distress  Musculoskeletal:      Right lower leg: No edema  Left lower leg: No edema  Neurological:      Mental Status: She is alert and oriented to person, place, and time  Skin:     General: Skin is warm and dry  Psychiatric:         Mood and Affect: Mood normal          Behavior: Behavior normal          Thought Content: Thought content normal          Judgment: Judgment normal    Vitals and nursing note reviewed

## 2022-04-11 PROBLEM — Z98.890 HX OF TOTAL THYROIDECTOMY: Status: ACTIVE | Noted: 2022-04-11

## 2022-04-11 PROBLEM — E06.3 HASHIMOTO'S THYROIDITIS: Status: ACTIVE | Noted: 2022-04-11

## 2022-04-11 PROBLEM — Z90.89 HX OF TOTAL THYROIDECTOMY: Status: ACTIVE | Noted: 2022-04-11

## 2022-04-11 PROBLEM — J38.3 GLOTTIC INSUFFICIENCY: Status: ACTIVE | Noted: 2022-04-11

## 2022-04-11 PROBLEM — E89.0 HX OF TOTAL THYROIDECTOMY: Status: ACTIVE | Noted: 2022-04-11

## 2022-04-11 PROBLEM — K21.9 REFLUX LARYNGITIS: Status: ACTIVE | Noted: 2022-04-11

## 2022-04-11 PROBLEM — K11.7 XEROSTOMIA: Status: ACTIVE | Noted: 2022-04-11

## 2022-04-11 PROBLEM — R49.0 MUSCLE TENSION DYSPHONIA: Status: ACTIVE | Noted: 2022-04-11

## 2022-04-11 PROBLEM — R49.0 DYSPHONIA: Status: ACTIVE | Noted: 2022-04-11

## 2022-04-11 PROBLEM — R13.14 PHARYNGOESOPHAGEAL DYSPHAGIA: Status: ACTIVE | Noted: 2022-04-11

## 2022-04-11 PROBLEM — K21.9 GASTROESOPHAGEAL REFLUX DISEASE: Status: ACTIVE | Noted: 2022-04-11

## 2022-04-11 PROBLEM — J38.01 PARESIS OF RIGHT VOCAL FOLD: Status: ACTIVE | Noted: 2022-04-11

## 2022-04-11 PROBLEM — J04.0 REFLUX LARYNGITIS: Status: ACTIVE | Noted: 2022-04-11

## 2022-04-18 ENCOUNTER — OFFICE VISIT (OUTPATIENT)
Dept: FAMILY MEDICINE CLINIC | Facility: CLINIC | Age: 62
End: 2022-04-18
Payer: COMMERCIAL

## 2022-04-18 VITALS
SYSTOLIC BLOOD PRESSURE: 122 MMHG | WEIGHT: 242 LBS | TEMPERATURE: 97.9 F | DIASTOLIC BLOOD PRESSURE: 80 MMHG | BODY MASS INDEX: 38.89 KG/M2 | HEART RATE: 62 BPM | HEIGHT: 66 IN

## 2022-04-18 DIAGNOSIS — R73.03 PREDIABETES: ICD-10-CM

## 2022-04-18 DIAGNOSIS — Z12.11 COLON CANCER SCREENING: ICD-10-CM

## 2022-04-18 DIAGNOSIS — J30.2 SEASONAL ALLERGIES: ICD-10-CM

## 2022-04-18 DIAGNOSIS — Z23 NEED FOR TDAP VACCINATION: Primary | ICD-10-CM

## 2022-04-18 DIAGNOSIS — N39.41 URGE INCONTINENCE: ICD-10-CM

## 2022-04-18 PROCEDURE — 90715 TDAP VACCINE 7 YRS/> IM: CPT | Performed by: FAMILY MEDICINE

## 2022-04-18 PROCEDURE — 90471 IMMUNIZATION ADMIN: CPT | Performed by: FAMILY MEDICINE

## 2022-04-18 PROCEDURE — 99214 OFFICE O/P EST MOD 30 MIN: CPT | Performed by: FAMILY MEDICINE

## 2022-04-18 RX ORDER — SOLIFENACIN SUCCINATE 10 MG/1
10 TABLET, FILM COATED ORAL DAILY
Qty: 90 TABLET | Refills: 1 | Status: SHIPPED | OUTPATIENT
Start: 2022-04-18

## 2022-04-18 RX ORDER — FLUTICASONE PROPIONATE 50 MCG
2 SPRAY, SUSPENSION (ML) NASAL AS NEEDED
Qty: 16 G | Refills: 3 | Status: SHIPPED | OUTPATIENT
Start: 2022-04-18

## 2022-04-18 NOTE — PROGRESS NOTES
Dona Teixeira 1960 female MRN: [id number]    Family Medicine Follow-up Visit    Assessment/Plan   UI  Increase Vesicare to 10mg QD  If no improvement in 1 month refer to urogyn    Obesity   Refer to weight management team    TDAP due to  in the family  Obtain labs prior to next visit  Referral placed for [facility] screening/follow up    Dian Hilton was seen today for follow-up  Diagnoses and all orders for this visit:    Need for Tdap vaccination  -     TDAP VACCINE GREATER THAN OR EQUAL TO 6YO IM    Urge incontinence  -     solifenacin (VESICARE) 10 MG tablet; Take 1 tablet (10 mg total) by mouth daily    Seasonal allergies  -     fluticasone (FLONASE) 50 mcg/act nasal spray; 2 sprays into each nostril as needed for rhinitis    BMI 39 0-39 9,adult  -     Ambulatory Referral to Weight Management; Future  -     Lipid Panel with Direct LDL reflex; Future  -     Hemoglobin A1C; Future  -     Comprehensive metabolic panel; Future  -     CBC; Future  -     TSH, 3rd generation with Free T4 reflex; Future    Prediabetes  -     Ambulatory Referral to Weight Management; Future  -     Lipid Panel with Direct LDL reflex; Future  -     Hemoglobin A1C; Future  -     Comprehensive metabolic panel; Future  -     CBC; Future  -     TSH, 3rd generation with Free T4 reflex; Future    Colon cancer screening  -     Ambulatory referral for colonoscopy; Future      Richardson Chiang MD  [address]  2022      Please be aware that this note contains text that was dictated and there may be errors pertaining to "sound-alike" words during the dictation process  SUBJECTIVE    CC: Follow-up    HPI:  Dona Teixeira is a 60 y o  female who presented for a follow-up of chronic concerns  She had D&C for PMB, which has now resolved  She saw ENT at the behest of her endo, due to voice changes, throat clearing  He recommended treatment for LPR   She hasn't started it yet, as she was waiting on test results (which are normal)  New hair loss - female pattern  vesicare - started leaking again, has to wear pads ago  Initially it was working well  She will be traveling to [place] in June-July and may need prescription refills while she's there  She's been trying to call for colonoscopy, but unable to get through/no one returns her call  Would like help losing weight  Review of Systems   Constitutional: Negative for activity change, chills and fever  HENT: Positive for voice change  Negative for congestion, rhinorrhea, sore throat and trouble swallowing  Throat clearing   Eyes: Negative for visual disturbance  Respiratory: Negative for cough, shortness of breath and wheezing  Cardiovascular: Negative for chest pain and palpitations  Gastrointestinal: Negative for abdominal pain, blood in stool, constipation, diarrhea, nausea and vomiting  Endocrine:        Hair loss   Genitourinary: Negative for dysuria  UI   Musculoskeletal: Positive for arthralgias and gait problem  Skin: Negative for rash  Neurological: Negative for dizziness, weakness and headaches  All other systems reviewed and are negative      Historical Information     The following portions of the patient's history were reviewed and updated as appropriate: allergies, current medications, past medical history, past social history and problem list     Medications:   Meds/Allergies     Current Outpatient Medications:     atorvastatin (LIPITOR) 10 mg tablet, Take 1 tablet (10 mg total) by mouth daily, Disp: 90 tablet, Rfl: 1    celecoxib (CeleBREX) 200 mg capsule, Take 200 mg by mouth 2 (two) times a day As needed , Disp: , Rfl:     cetirizine (ZyrTEC) 10 mg tablet, as needed  , Disp: , Rfl:     famotidine (PEPCID) 40 MG tablet, Take 1 tablet (40 mg total) by mouth daily at bedtime, Disp: 30 tablet, Rfl: 11    fluticasone (FLONASE) 50 mcg/act nasal spray, 2 sprays into each nostril as needed for rhinitis, Disp: 16 g, Rfl: 3    omeprazole (PriLOSEC) 40 MG capsule, Take 1 capsule (40 mg total) by mouth every morning 30 min before breakfast, Disp: 30 capsule, Rfl: 11    Synthroid 200 MCG tablet, Take 1 tablet (200 mcg total) by mouth daily in the early morning, Disp: 90 tablet, Rfl: 1    valsartan-hydrochlorothiazide (DIOVAN-HCT) 80-12 5 MG per tablet, Take 1 tablet by mouth daily, Disp: 90 tablet, Rfl: 1    solifenacin (VESICARE) 10 MG tablet, Take 1 tablet (10 mg total) by mouth daily, Disp: 90 tablet, Rfl: 1  Allergies   Allergen Reactions    Sulfa Antibiotics Rash       OBJECTIVE    Vitals:   Vitals:    04/18/22 1355   BP: 122/80   Pulse: 62   Temp: 97 9 °F (36 6 °C)   Weight: 110 kg (242 lb)   Height: 5' 6" (1 676 m)     Wt Readings from Last 3 Encounters:   04/18/22 110 kg (242 lb)   03/31/22 107 kg (235 lb)   03/21/22 108 kg (239 lb)     Body mass index is 39 06 kg/m²  BP Readings from Last 3 Encounters:   04/18/22 122/80   03/21/22 142/80   03/07/22 118/64     Pulse Readings from Last 3 Encounters:   04/18/22 62   03/07/22 60   02/04/22 77     No LMP recorded  Patient is postmenopausal     Physical Exam:    Physical Exam  Vitals and nursing note reviewed  Constitutional:       General: She is not in acute distress  Appearance: Normal appearance  She is well-developed  She is obese  She is not ill-appearing or diaphoretic  HENT:      Head: Normocephalic and atraumatic  Right Ear: External ear normal       Left Ear: External ear normal       Nose: Nose normal    Eyes:      General: Lids are normal       Conjunctiva/sclera: Conjunctivae normal    Neck:      Vascular: No JVD  Trachea: No tracheal deviation  Cardiovascular:      Rate and Rhythm: Normal rate  Pulses: Normal pulses  Pulmonary:      Effort: Pulmonary effort is normal  No accessory muscle usage or respiratory distress  Breath sounds: No wheezing or rhonchi  Skin:     Findings: No rash     Neurological:      Mental Status: She is alert  Labs: I have personally reviewed all pertinent results  Imaging:  I have personally reviewed all pertinent results

## 2022-04-18 NOTE — PATIENT INSTRUCTIONS
Diet for Stomach Ulcers and Gastritis   WHAT YOU NEED TO KNOW:   A diet for stomach ulcers and gastritis is a meal plan that limits foods that irritate your stomach  Certain foods may worsen symptoms such as stomach pain, bloating, heartburn, or indigestion  DISCHARGE INSTRUCTIONS:   Foods to limit or avoid:  You may need to avoid acidic, spicy, or high-fat foods  Not all foods affect everyone the same way  You will need to learn which foods worsen your symptoms and limit those foods  The following are some foods that may worsen ulcer or gastritis symptoms:  · Beverages:      ? Whole milk and chocolate milk    ? Hot cocoa and cola    ? Any beverage with caffeine    ? Regular and decaffeinated coffee    ? Peppermint and spearmint tea    ? Green and black tea, with or without caffeine    ? Orange and grapefruit juices    ? Drinks that contain alcohol    · Spices and seasonings:      ? Black and red pepper    ? Chili powder    ? Mustard seed and nutmeg    · Other foods:      ? Dairy foods made from whole milk or cream    ? Chocolate    ? Spicy or strongly flavored cheeses, such as jalapeno or black pepper    ? Highly seasoned, high-fat meats, such as sausage, salami, harkins, ham, and cold cuts    ? Hot chiles and peppers    ? Tomato products, such as tomato paste, tomato sauce, or tomato juice    Foods to include:  Eat a variety of healthy foods from all the food groups  Eat fruits, vegetables, whole grains, and fat-free or low-fat dairy foods  Whole grains include whole-wheat breads, cereals, pasta, and brown rice  Choose lean meats, poultry (chicken and turkey), fish, beans, eggs, and nuts  A healthy meal plan is low in unhealthy fats, salt, and added sugar  Healthy fats include olive oil and canola oil  Ask your dietitian for more information about a healthy diet  Other helpful guidelines:   · Do not eat right before bedtime  Stop eating at least 2 hours before bedtime  · Eat small, frequent meals    Your stomach may tolerate small, frequent meals better than large meals  © Copyright [employer] 2022 Information is for End User's use only and may not be sold, redistributed or otherwise used for commercial purposes  All illustrations and images included in CareNotes® are the copyrighted property of [employer] M [employer]  or [employer]  The above information is an  only  It is not intended as medical advice for individual conditions or treatments  Talk to your doctor, nurse or pharmacist before following any medical regimen to see if it is safe and effective for you

## 2022-04-19 ENCOUNTER — HOSPITAL ENCOUNTER (OUTPATIENT)
Dept: NON INVASIVE DIAGNOSTICS | Facility: CLINIC | Age: 62
Discharge: HOME | End: 2022-04-19
Payer: COMMERCIAL

## 2022-04-19 DIAGNOSIS — I99.9 VASCULAR DISEASE: ICD-10-CM

## 2022-04-19 PROCEDURE — 93925 LOWER EXTREMITY STUDY: CPT

## 2022-04-19 PROCEDURE — 93923 UPR/LXTR ART STDY 3+ LVLS: CPT

## 2022-04-19 PROCEDURE — 93922 UPR/L XTREMITY ART 2 LEVELS: CPT | Performed by: SURGERY

## 2022-04-19 PROCEDURE — 93970 EXTREMITY STUDY: CPT

## 2022-04-19 PROCEDURE — 93925 LOWER EXTREMITY STUDY: CPT | Performed by: SURGERY

## 2022-04-19 PROCEDURE — 93970 EXTREMITY STUDY: CPT | Performed by: SURGERY

## 2022-04-20 NOTE — PROGRESS NOTES
Contacted patient but she did not want to schedule until the end of August  As of right now our schedules are not available from 8/15/2022 until the end of the year  I will contact patient when the schedule becomes available

## 2022-05-26 ENCOUNTER — PREP FOR PROCEDURE (OUTPATIENT)
Dept: GASTROENTEROLOGY | Facility: AMBULARY SURGERY CENTER | Age: 62
End: 2022-05-26

## 2022-05-26 DIAGNOSIS — Z12.11 SPECIAL SCREENING FOR MALIGNANT NEOPLASMS, COLON: Primary | ICD-10-CM

## 2022-05-27 ENCOUNTER — OFFICE VISIT (OUTPATIENT)
Dept: BARIATRICS | Facility: CLINIC | Age: 62
End: 2022-05-27

## 2022-05-27 VITALS — WEIGHT: 241.4 LBS | HEIGHT: 66 IN | BODY MASS INDEX: 38.8 KG/M2

## 2022-05-27 DIAGNOSIS — R63.5 ABNORMAL WEIGHT GAIN: ICD-10-CM

## 2022-05-27 DIAGNOSIS — R73.03 PREDIABETES: ICD-10-CM

## 2022-05-27 PROCEDURE — RECHECK: Performed by: DIETITIAN, REGISTERED

## 2022-05-27 PROCEDURE — WMDI30: Performed by: DIETITIAN, REGISTERED

## 2022-05-27 NOTE — PROGRESS NOTES
Weight Management Medical Nutrition Assessment  Chel presented for a meal planning session  Today's weight is 241 4#  Per dietary recall patient consumes excess calories from calorie dense meals when dining out at lunch daily and snacking on sweets in the afternoon  She is not food logging and her physical activity is low  Developed and reviewed a low calorie meal plan    Patient seen by Medical Provider in past 6 months:  no  Requested to schedule appointment with Medical Provider: No      Anthropometric Measurements  Start Weight (#): 241 2#  Current Weight (#): n/a  TBW % Change from start weight:n/a  Ideal Body Weight (#):ST# ST#  Goal Weight (#):ST# LTG : 180#    Weight Loss History  Previous weight loss attempts: Self Created Diets (Portion Control, Healthy Food Choices, etc )    Food and Nutrition Related History    Food Recall  Breakfast:coffee 1/2 tsp sugar/ milk -unmeasured   Special K / Jono Charms -unmeasured Skim   Snack:skip  Lunch: [address] [employer] [place] [employer] / [place] - graze  fruit  Mindy Harries / [employer] / [employer]/ [employer] - larger portions of carb  Snack:skip       Beverages: water and regular soda ( Fast [employer] )  Volume of beverage intake: 20oz    Weekends: Same  Cravings: chocolate  Trouble area of day:lunch meal and afternoon snacking     Frequency of Eating out: daily  Food restrictions:none reported  Cooking: self   Food Shopping: self    Physical Activity Intake  Activity:Walking  Frequency:1-2 x week  Physical limitations/barriers to exercise: none    Estimated Needs  Energy:  [employer] [place] Energy Needs: BMR : 1669 calories    1-2# loss weekly sedentary:  8371-1307 calories             1-2# loss weekly lightly active:9201-6175 calories  Maintenance calories for sedentary activity level: 2003 calories  Protein:70-90gm      (1 2-1 5g/kg IBW)  Fluid: 69oz     (35mL/kg IBW)    Nutrition Diagnosis  Yes;     Overweight/obesity related to Excess energy intake as evidenced by  BMI more than normative standard for age and sex (obesity-grade II 35-39  9)       Nutrition Intervention    Nutrition Prescription  Calories:1200 calories on sedentary day and flex to 1400 calories on walk days  Protein:70-90gm  Fluid:70oz    Meal Plan (Dread/Pro/Carb)  Breakfast: 200-300/20/30  Snack:  Lunch: 300/30/30-45  Snack: 150/>5/20  Dinner: 300/30/30-45  Snack: 150/>5/20    Nutrition Education:    Healthy Core Manual  Calorie controlled menu  Lean protein food choices  Healthy snack options  Food journaling tips      Nutrition Counseling:  Strategies: meal planning, portion sizes, healthy snack choices, hydration, fiber intake, protein intake, exercise, food journal      Monitoring and Evaluation:  Evaluation criteria:  Energy Intake  Meet protein needs  Maintain adequate hydration  Monitor weekly weight  Meal planning/preparation  Food journal   Decreased portions at mealtimes and snacks  Physical activity     Barriers to learning:none  Readiness to change: Preparation:  (Getting ready to change)   Comprehension: good  Expected Compliance: good

## 2022-07-08 DIAGNOSIS — E89.0 POSTOPERATIVE HYPOTHYROIDISM: ICD-10-CM

## 2022-07-08 DIAGNOSIS — E78.00 HYPERCHOLESTEREMIA: ICD-10-CM

## 2022-07-08 RX ORDER — LEVOTHYROXINE SODIUM 200 MCG
TABLET ORAL
Qty: 90 TABLET | Refills: 1 | Status: SHIPPED | OUTPATIENT
Start: 2022-07-08

## 2022-07-08 RX ORDER — ATORVASTATIN CALCIUM 10 MG/1
TABLET, FILM COATED ORAL
Qty: 90 TABLET | Refills: 1 | Status: SHIPPED | OUTPATIENT
Start: 2022-07-08

## 2022-07-11 DIAGNOSIS — I10 ESSENTIAL HYPERTENSION: ICD-10-CM

## 2022-07-11 RX ORDER — VALSARTAN AND HYDROCHLOROTHIAZIDE 80; 12.5 MG/1; MG/1
1 TABLET, FILM COATED ORAL DAILY
Qty: 90 TABLET | Refills: 0 | Status: SHIPPED | OUTPATIENT
Start: 2022-07-11 | End: 2022-10-10

## 2022-08-18 ENCOUNTER — HOSPITAL ENCOUNTER (OUTPATIENT)
Dept: MAMMOGRAPHY | Facility: CLINIC | Age: 62
Discharge: HOME | End: 2022-08-18
Payer: COMMERCIAL

## 2022-08-18 ENCOUNTER — HOSPITAL ENCOUNTER (OUTPATIENT)
Dept: ULTRASOUND IMAGING | Facility: CLINIC | Age: 62
Discharge: HOME | End: 2022-08-18
Payer: COMMERCIAL

## 2022-08-18 VITALS — BODY MASS INDEX: 38.73 KG/M2 | WEIGHT: 241 LBS | HEIGHT: 66 IN

## 2022-08-18 DIAGNOSIS — R92.8 ABNORMAL MAMMOGRAM: ICD-10-CM

## 2022-08-18 PROCEDURE — G0279 TOMOSYNTHESIS, MAMMO: HCPCS

## 2022-08-18 PROCEDURE — 76642 ULTRASOUND BREAST LIMITED: CPT

## 2022-08-18 PROCEDURE — 77066 DX MAMMO INCL CAD BI: CPT

## 2022-08-24 ENCOUNTER — ANESTHESIA (OUTPATIENT)
Dept: GASTROENTEROLOGY | Facility: AMBULARY SURGERY CENTER | Age: 62
End: 2022-08-24

## 2022-08-24 ENCOUNTER — ANESTHESIA EVENT (OUTPATIENT)
Dept: GASTROENTEROLOGY | Facility: AMBULARY SURGERY CENTER | Age: 62
End: 2022-08-24

## 2022-08-24 ENCOUNTER — HOSPITAL ENCOUNTER (OUTPATIENT)
Dept: GASTROENTEROLOGY | Facility: AMBULARY SURGERY CENTER | Age: 62
Setting detail: OUTPATIENT SURGERY
Discharge: HOME | End: 2022-08-24
Attending: INTERNAL MEDICINE
Payer: COMMERCIAL

## 2022-08-24 VITALS
HEART RATE: 58 BPM | RESPIRATION RATE: 19 BRPM | WEIGHT: 225 LBS | DIASTOLIC BLOOD PRESSURE: 61 MMHG | OXYGEN SATURATION: 99 % | TEMPERATURE: 96.9 F | HEIGHT: 66 IN | SYSTOLIC BLOOD PRESSURE: 120 MMHG | BODY MASS INDEX: 36.16 KG/M2

## 2022-08-24 DIAGNOSIS — Z12.11 SPECIAL SCREENING FOR MALIGNANT NEOPLASMS, COLON: ICD-10-CM

## 2022-08-24 PROCEDURE — 45380 COLONOSCOPY AND BIOPSY: CPT | Performed by: INTERNAL MEDICINE

## 2022-08-24 PROCEDURE — 88305 TISSUE EXAM BY PATHOLOGIST: CPT | Performed by: PATHOLOGY

## 2022-08-24 RX ORDER — SODIUM CHLORIDE, SODIUM LACTATE, POTASSIUM CHLORIDE, CALCIUM CHLORIDE 600; 310; 30; 20 MG/100ML; MG/100ML; MG/100ML; MG/100ML
INJECTION, SOLUTION INTRAVENOUS CONTINUOUS PRN
Status: DISCONTINUED | OUTPATIENT
Start: 2022-08-24 | End: 2022-08-24

## 2022-08-24 RX ORDER — PROPOFOL 10 MG/ML
INJECTION, EMULSION INTRAVENOUS AS NEEDED
Status: DISCONTINUED | OUTPATIENT
Start: 2022-08-24 | End: 2022-08-24

## 2022-08-24 RX ADMIN — PROPOFOL 30 MG: 10 INJECTION, EMULSION INTRAVENOUS at 08:40

## 2022-08-24 RX ADMIN — SODIUM CHLORIDE, SODIUM LACTATE, POTASSIUM CHLORIDE, AND CALCIUM CHLORIDE: .6; .31; .03; .02 INJECTION, SOLUTION INTRAVENOUS at 08:23

## 2022-08-24 RX ADMIN — PROPOFOL 50 MG: 10 INJECTION, EMULSION INTRAVENOUS at 08:30

## 2022-08-24 RX ADMIN — PROPOFOL 50 MG: 10 INJECTION, EMULSION INTRAVENOUS at 08:28

## 2022-08-24 RX ADMIN — PROPOFOL 100 MG: 10 INJECTION, EMULSION INTRAVENOUS at 08:26

## 2022-08-24 RX ADMIN — PROPOFOL 50 MG: 10 INJECTION, EMULSION INTRAVENOUS at 08:37

## 2022-08-24 RX ADMIN — PROPOFOL 50 MG: 10 INJECTION, EMULSION INTRAVENOUS at 08:35

## 2022-08-24 RX ADMIN — PROPOFOL 50 MG: 10 INJECTION, EMULSION INTRAVENOUS at 08:33

## 2022-08-24 RX ADMIN — PROPOFOL 50 MG: 10 INJECTION, EMULSION INTRAVENOUS at 08:31

## 2022-08-24 NOTE — H&P
History and Physical -  [facility]  Yovani Baum 60 y o  female MRN: [id number]    HPI: Yovani Baum is a 60y o  year old female who presents with screening colonosocpy         Review of Systems    Historical Information   Past Medical History:   Diagnosis Date    Arthritis     Asthma     BRCA1 negative     BRCA2 negative     Cancer ([facility] [address] )     thyroid cancer    Colon polyp     Disease of thyroid gland     Fibroid     History of COVID-19 01/04/2022    mild cold s/s    Hyperlipidemia     Hypertension     Hypothyroidism     PONV (postoperative nausea and vomiting)     Postmenopausal bleeding 11/23/2021    Seasonal allergies     Varicella      Past Surgical History:   Procedure Laterality Date    ADENOIDECTOMY      BREAST CYST EXCISION Right 1991    Benign    [place] HYSTEROSCOPY,W/ENDO BX N/A 03/07/2022    Procedure: DILATATION AND CURETTAGE (D&C) WITH HYSTEROSCOPY, polypectomy;  Surgeon: Juan Rome MD;  Location: [facility] MA[facility] OR;  Service: Gynecology    THYROIDECTOMY      TONSILLECTOMY      VARICOSE VEIN SURGERY Bilateral      Social History   Social History     Substance and Sexual Activity   Alcohol Use Yes    Alcohol/week: 1 0 standard drink    Types: 1 Glasses of wine per week    Comment: Only on holidays     Social History     Substance and Sexual Activity   Drug Use Never     Social History     Tobacco Use   Smoking Status Never Smoker   Smokeless Tobacco Never Used     Family History   Problem Relation Age of Onset    Breast cancer Mother 48    Heart disease Mother     Heart disease Father     Stroke Father     Kidney disease Father     Breast cancer Sister 48    Hypertension Sister     No Known Problems Daughter     No Known Problems Daughter     Prostate cancer Maternal Grandfather     Diabetes Maternal Aunt     Diabetes Maternal Uncle     Diabetes Paternal [de-identified]     Breast cancer Cousin 39       Meds/Allergies     (Not in a hospital admission)      Allergies Allergen Reactions    Sulfa Antibiotics Rash       Objective     /79   Pulse 67   Temp (!) 96 8 °F (36 °C) (Temporal)   Resp 18   Ht 5' 6" (1 676 m)   Wt 102 kg (225 lb)   SpO2 98%   BMI 36 32 kg/m²       PHYSICAL EXAM    Gen: NAD  CV: RRR  CHEST: Clear  ABD: soft, NT/ND  EXT: no edema  Neuro: AAO      ASSESSMENT/PLAN:  This is a 60y o  year old female here for screening colonosocpy         PLAN:   Procedure: colonoscopy

## 2022-08-24 NOTE — ANESTHESIA POSTPROCEDURE EVALUATION
Post-Op Assessment Note    CV Status:  Stable  Pain Score: 0    Pain management: adequate     Mental Status:  Alert and awake   Hydration Status:  Euvolemic   PONV Controlled:  Controlled   Airway Patency:  Patent      Post Op Vitals Reviewed: Yes      Staff: CRNA, Anesthesiologist         No complications documented      BP 99/59 (08/24/22 0848)    Temp (!) 96 9 °F (36 1 °C) (08/24/22 0848)    Pulse 66 (08/24/22 0848)   Resp 15 (08/24/22 0848)    SpO2 97 % (08/24/22 0848)

## 2022-08-24 NOTE — ANESTHESIA PREPROCEDURE EVALUATION
Procedure:  COLONOSCOPY    Relevant Problems   CARDIO   (+) Essential hypertension   (+) Hypercholesteremia      ENDO   (+) Postoperative hypothyroidism      GI/HEPATIC   (+) Gastroesophageal reflux disease   (+) Pharyngoesophageal dysphagia      NEURO/PSYCH   (+) History of malignant neoplasm of thyroid   PONV  BMI 36    Physical Exam    Airway    Mallampati score: II  TM Distance: >3 FB  Neck ROM: full     Dental   No notable dental hx     Cardiovascular  Rate: normal,     Pulmonary  Pulmonary exam normal     Other Findings        Anesthesia Plan  ASA Score- 2     Anesthesia Type- IV sedation with anesthesia with ASA Monitors  Additional Monitors:   Airway Plan:           Plan Factors-    Chart reviewed  Patient summary reviewed  Induction- intravenous  Postoperative Plan-     Informed Consent- Anesthetic plan and risks discussed with patient  I personally reviewed this patient with the CRNA  Discussed and agreed on the Anesthesia Plan with the CRNA  Lizzeth Prater

## 2022-08-30 PROCEDURE — 88305 TISSUE EXAM BY PATHOLOGIST: CPT | Performed by: PATHOLOGY

## 2022-10-08 DIAGNOSIS — I10 ESSENTIAL HYPERTENSION: ICD-10-CM

## 2022-10-10 RX ORDER — VALSARTAN AND HYDROCHLOROTHIAZIDE 80; 12.5 MG/1; MG/1
TABLET, FILM COATED ORAL
Qty: 90 TABLET | Refills: 0 | Status: SHIPPED | OUTPATIENT
Start: 2022-10-10

## 2022-10-12 LAB
ALBUMIN SERPL-MCNC: 4.4 G/DL (ref 3.8–4.8)
ALBUMIN/GLOB SERPL: 1.9 {RATIO} (ref 1.2–2.2)
ALP SERPL-CCNC: 123 IU/L (ref 44–121)
ALT SERPL-CCNC: 19 IU/L (ref 0–32)
AST SERPL-CCNC: 22 IU/L (ref 0–40)
BASOPHILS # BLD AUTO: 0.1 X10E3/UL (ref 0–0.2)
BASOPHILS NFR BLD AUTO: 1 %
BILIRUB SERPL-MCNC: 0.3 MG/DL (ref 0–1.2)
BUN SERPL-MCNC: 20 MG/DL (ref 8–27)
BUN/CREAT SERPL: 23 (ref 12–28)
CALCIUM SERPL-MCNC: 9.5 MG/DL (ref 8.7–10.3)
CHLORIDE SERPL-SCNC: 105 MMOL/L (ref 96–106)
CHOLEST SERPL-MCNC: 150 MG/DL (ref 100–199)
CO2 SERPL-SCNC: 27 MMOL/L (ref 20–29)
CREAT SERPL-MCNC: 0.87 MG/DL (ref 0.57–1)
EGFR: 75 ML/MIN/1.73
EOSINOPHIL # BLD AUTO: 0.2 X10E3/UL (ref 0–0.4)
EOSINOPHIL NFR BLD AUTO: 3 %
ERYTHROCYTE [DISTWIDTH] IN BLOOD BY AUTOMATED COUNT: 12.9 % (ref 11.7–15.4)
GLOBULIN SER-MCNC: 2.3 G/DL (ref 1.5–4.5)
GLUCOSE SERPL-MCNC: 90 MG/DL (ref 70–99)
HBA1C MFR BLD: 5.7 % (ref 4.8–5.6)
HCT VFR BLD AUTO: 40.2 % (ref 34–46.6)
HDLC SERPL-MCNC: 45 MG/DL
HGB BLD-MCNC: 13.4 G/DL (ref 11.1–15.9)
IMM GRANULOCYTES # BLD: 0 X10E3/UL (ref 0–0.1)
IMM GRANULOCYTES NFR BLD: 0 %
LDLC SERPL CALC-MCNC: 83 MG/DL (ref 0–99)
LYMPHOCYTES # BLD AUTO: 2.4 X10E3/UL (ref 0.7–3.1)
LYMPHOCYTES NFR BLD AUTO: 38 %
MCH RBC QN AUTO: 28.6 PG (ref 26.6–33)
MCHC RBC AUTO-ENTMCNC: 33.3 G/DL (ref 31.5–35.7)
MCV RBC AUTO: 86 FL (ref 79–97)
MONOCYTES # BLD AUTO: 0.5 X10E3/UL (ref 0.1–0.9)
MONOCYTES NFR BLD AUTO: 7 %
NEUTROPHILS # BLD AUTO: 3.1 X10E3/UL (ref 1.4–7)
NEUTROPHILS NFR BLD AUTO: 51 %
PLATELET # BLD AUTO: 215 X10E3/UL (ref 150–450)
POTASSIUM SERPL-SCNC: 4.2 MMOL/L (ref 3.5–5.2)
PROT SERPL-MCNC: 6.7 G/DL (ref 6–8.5)
RBC # BLD AUTO: 4.68 X10E6/UL (ref 3.77–5.28)
SODIUM SERPL-SCNC: 142 MMOL/L (ref 134–144)
TRIGL SERPL-MCNC: 121 MG/DL (ref 0–149)
TSH SERPL DL<=0.005 MIU/L-ACNC: 2.15 UIU/ML (ref 0.45–4.5)
WBC # BLD AUTO: 6.2 X10E3/UL (ref 3.4–10.8)

## 2022-10-19 ENCOUNTER — OFFICE VISIT (OUTPATIENT)
Dept: FAMILY MEDICINE CLINIC | Facility: CLINIC | Age: 62
End: 2022-10-19
Payer: COMMERCIAL

## 2022-10-19 VITALS
HEIGHT: 66 IN | HEART RATE: 76 BPM | WEIGHT: 227 LBS | TEMPERATURE: 98.2 F | SYSTOLIC BLOOD PRESSURE: 122 MMHG | DIASTOLIC BLOOD PRESSURE: 80 MMHG | BODY MASS INDEX: 36.48 KG/M2

## 2022-10-19 DIAGNOSIS — M25.511 ACUTE PAIN OF RIGHT SHOULDER: ICD-10-CM

## 2022-10-19 DIAGNOSIS — Z00.00 ANNUAL PHYSICAL EXAM: Primary | ICD-10-CM

## 2022-10-19 DIAGNOSIS — Z23 IMMUNIZATION DUE: ICD-10-CM

## 2022-10-19 PROCEDURE — 99396 PREV VISIT EST AGE 40-64: CPT | Performed by: FAMILY MEDICINE

## 2022-10-19 PROCEDURE — 90472 IMMUNIZATION ADMIN EACH ADD: CPT | Performed by: FAMILY MEDICINE

## 2022-10-19 PROCEDURE — 90750 HZV VACC RECOMBINANT IM: CPT | Performed by: FAMILY MEDICINE

## 2022-10-19 PROCEDURE — 90471 IMMUNIZATION ADMIN: CPT | Performed by: FAMILY MEDICINE

## 2022-10-19 PROCEDURE — 90682 RIV4 VACC RECOMBINANT DNA IM: CPT | Performed by: FAMILY MEDICINE

## 2022-10-19 NOTE — PROGRESS NOTES
ADULT ANNUAL [address]    NAME: Waleska Owusu  AGE: 60 y o  SEX: female  : 1960   DATE: 10/19/2022     Assessment and Plan:     Problem List Items Addressed This Visit    None     Visit Diagnoses     Annual physical exam    -  Primary    Immunization due        Relevant Orders    influenza vaccine, quadrivalent, recombinant, PF, 0 5 mL, for patients 18 yr+ (FLUBLOK)    Zoster Vaccine Recombinant IM    BMI 36 0-36 9,adult        Acute pain of right shoulder          - reminded to return for follow up with OBGYN  - reminded to return for follow up with ENT    F/u 6 months    Immunizations and preventive care screenings were discussed with patient today  Appropriate education was printed on patient's after visit summary  Counseling:  Alcohol/drug use: discussed moderation in alcohol intake, the recommendations for healthy alcohol use, and avoidance of illicit drug use  Dental Health: discussed importance of regular tooth brushing, flossing, and dental visits  Exercise: the importance of regular exercise/physical activity was discussed  Recommend exercise 3-5 times per week for at least 30 minutes  Return in about 1 year (around 10/19/2023) for Annual physical      Chief Complaint:     Chief Complaint   Patient presents with   • Annual Exam      History of Present Illness:     Adult Annual Physical   Patient here for a comprehensive physical exam  The patient reports no problems  She does have some right shoulder pain intermittently due to new dog that's pulling at the leash  Diet and Physical Activity  Diet/Nutrition: well balanced diet  Exercise: walking        Depression Screening  PHQ-2/9 Depression Screening    Little interest or pleasure in doing things: 0 - not at all  Feeling down, depressed, or hopeless: 0 - not at all  PHQ-2 Score: 0  PHQ-2 Interpretation: Negative depression screen       General Health  Sleep: sleeps well    Hearing: normal - bilateral   Vision: wears glasses and has floaters  Dental: regular dental visits  /GYN Health  Patient is: postmenopausal  Last menstrual period: No LMP recorded  Patient is postmenopausal      Review of Systems:     Review of Systems   Constitutional: Negative for activity change, chills and fever  HENT: Negative for congestion, rhinorrhea and sore throat  Eyes: Negative for visual disturbance  Respiratory: Negative for cough, shortness of breath and wheezing  Cardiovascular: Negative for chest pain and palpitations  Gastrointestinal: Negative for abdominal pain, blood in stool, constipation, diarrhea, nausea and vomiting  Genitourinary: Negative for dysuria  Musculoskeletal: Positive for arthralgias  Negative for myalgias  Skin: Negative for rash  Neurological: Negative for dizziness, weakness and headaches  Psychiatric/Behavioral: Negative for dysphoric mood  All other systems reviewed and are negative       Past Medical History:     Past Medical History:   Diagnosis Date   • Arthritis    • Asthma    • BRCA1 negative    • BRCA2 negative    • Cancer (HCC)     thyroid cancer   • Colon polyp    • Disease of thyroid gland    • Fibroid    • History of COVID-19 01/04/2022    mild cold s/s   • Hyperlipidemia    • Hypertension    • Hypothyroidism    • PONV (postoperative nausea and vomiting)    • Postmenopausal bleeding 11/23/2021   • Seasonal allergies    • Varicella       Past Surgical History:     Past Surgical History:   Procedure Laterality Date   • ADENOIDECTOMY     • BREAST CYST EXCISION Right 1991    Benign   • [place] HYSTEROSCOPY,W/ENDO BX N/A 03/07/2022    Procedure: DILATATION AND CURETTAGE (D&C) WITH HYSTEROSCOPY, polypectomy;  Surgeon: Dann Dan MD;  [facility];  Service: Gynecology   • THYROIDECTOMY     • TONSILLECTOMY     • VARICOSE VEIN SURGERY Bilateral       Social History:     Social History     Socioeconomic History   • Marital status: /Civil Union     Spouse name: None   • Number of children: None   • Years of education: None   • Highest education level: None   Occupational History   • None   Tobacco Use   • Smoking status: Never Smoker   • Smokeless tobacco: Never Used   Vaping Use   • Vaping Use: Never used   Substance and Sexual Activity   • Alcohol use:  Yes     Alcohol/week: 1 0 standard drink     Types: 1 Glasses of wine per week     Comment: Only on holidays   • Drug use: Never   • Sexual activity: Yes     Partners: Male     Birth control/protection: Post-menopausal   Other Topics Concern   • None   Social History Narrative   • None     Social Determinants of Health     Financial Resource Strain: Not on file   Food Insecurity: Not on file   Transportation Needs: Not on file   Physical Activity: Not on file   Stress: Not on file   Social Connections: Not on file   Intimate Partner Violence: Not on file   Housing Stability: Not on file      Family History:     Family History   Problem Relation Age of Onset   • Breast cancer Mother 48   • Heart disease Mother    • Heart disease Father    • Stroke Father    • Kidney disease Father    • Breast cancer Sister 48   • Hypertension Sister    • No Known Problems Daughter    • No Known Problems Daughter    • Prostate cancer Maternal Grandfather    • Diabetes Maternal Aunt    • Diabetes Maternal Uncle    • Diabetes Paternal Aunt    • Breast cancer Cousin 45      Current Medications:     Current Outpatient Medications   Medication Sig Dispense Refill   • atorvastatin (LIPITOR) 10 mg tablet take 1 tablet by mouth once daily 90 tablet 1   • celecoxib (CeleBREX) 200 mg capsule Take 200 mg by mouth 2 (two) times a day As needed      • cetirizine (ZyrTEC) 10 mg tablet as needed       • famotidine (PEPCID) 40 MG tablet Take 1 tablet (40 mg total) by mouth daily at bedtime 30 tablet 11   • fluticasone (FLONASE) 50 mcg/act nasal spray 2 sprays into each nostril as needed for rhinitis 16 g 3   • omeprazole (PriLOSEC) 40 MG capsule Take 1 capsule (40 mg total) by mouth every morning 30 min before breakfast 30 capsule 11   • solifenacin (VESICARE) 10 MG tablet Take 1 tablet (10 mg total) by mouth daily 90 tablet 1   • Synthroid 200 MCG tablet take 1 tablet by mouth every morning 90 tablet 1   • valsartan-hydrochlorothiazide (DIOVAN-HCT) 80-12 5 MG per tablet take 1 tablet by mouth once daily 90 tablet 0     No current facility-administered medications for this visit  Allergies: Allergies   Allergen Reactions   • Sulfa Antibiotics Rash      Physical Exam:     /80   Pulse 76   Temp 98 2 °F (36 8 °C)   Ht 5' 6" (1 676 m)   Wt 103 kg (227 lb)   BMI 36 64 kg/m²     Physical Exam  Vitals and nursing note reviewed  Constitutional:       General: She is not in acute distress  Appearance: She is well-developed  She is obese  She is not ill-appearing  HENT:      Head: Normocephalic and atraumatic  Right Ear: Tympanic membrane, ear canal and external ear normal  No middle ear effusion  Left Ear: Tympanic membrane, ear canal and external ear normal   No middle ear effusion  Nose: Nose normal  No congestion or rhinorrhea  Mouth/Throat:      Lips: Pink  Mouth: Mucous membranes are moist       Pharynx: Oropharynx is clear  Uvula midline  No oropharyngeal exudate  Tonsils: No tonsillar exudate  Eyes:      General: Lids are normal       Extraocular Movements: Extraocular movements intact  Conjunctiva/sclera: Conjunctivae normal       Pupils: Pupils are equal, round, and reactive to light  Neck:      Thyroid: No thyromegaly  Trachea: No tracheal deviation  Cardiovascular:      Rate and Rhythm: Normal rate and regular rhythm  Pulses: Normal pulses  Heart sounds: Normal heart sounds, S1 normal and S2 normal  No murmur heard  Pulmonary:      Effort: Pulmonary effort is normal  No respiratory distress  Breath sounds: Normal breath sounds   No decreased breath sounds, wheezing, rhonchi or rales  Abdominal:      General: Bowel sounds are normal  There is no distension  Palpations: Abdomen is soft  Tenderness: There is no abdominal tenderness  Musculoskeletal:      Right lower leg: No edema  Left lower leg: No edema  Lymphadenopathy:      Cervical: No cervical adenopathy  Skin:     General: Skin is warm and dry  Capillary Refill: Capillary refill takes less than 2 seconds  Neurological:      Mental Status: She is alert and oriented to person, place, and time  Cranial Nerves: Cranial nerves are intact  Deep Tendon Reflexes: Reflexes normal       Reflex Scores:       Patellar reflexes are 2+ on the right side and 2+ on the left side  Psychiatric:         Attention and Perception: Attention normal          Mood and Affect: Mood normal          Thought Content: Thought content does not include suicidal ideation            MD Ryan PabonBanner Thunderbird Medical Center

## 2022-10-19 NOTE — PATIENT INSTRUCTIONS
Exercises for Shoulder Abduction and Adduction   WHAT YOU NEED TO KNOW:   Shoulder abduction and adduction exercises work the muscles at the back of your shoulder and your upper back  DISCHARGE INSTRUCTIONS:   Contact your healthcare provider if:   · You have sharp or worsening pain during exercise or at rest     · You have questions or concerns about your shoulder exercises  Before you exercise:  Warm up and stretch before you exercise  Walk or ride a stationary bike for 5 to 10 minutes to help you warm up  Stretching helps increase range of motion  It may also decrease muscle soreness and help prevent another injury  Your healthcare provider will tell you which of the following stretches to do:  · Crossover arm stretch:  Relax your shoulders  Hold your upper arm with the opposite hand  Pull your arm across your chest until you feel a stretch  Hold the stretch for 30 seconds  Return to the starting position  · Shoulder flexion stretch:  Stand facing a wall  Slowly walk your fingers up the wall until you feel a stretch  Hold the stretch for 30 seconds  Return to the starting position  · Sleeper stretch:  Lie on your injured side on a firm, flat surface  Bend the elbow of your injured arm 90° with your hand facing up  Use your arm that is not injured to slowly push your injured arm down  Stop when you feel a stretch at the back of your injured shoulder  Hold the stretch for 30 seconds  Slowly return to the starting position  How to exercise with a weight:  Your healthcare provider will tell you how much weight to use  · Shoulder abduction:  Stand and hold a weight in your hand with your palm facing your body  Slowly raise your arm to the side with your thumb pointing up  Then raise your arm over your head as far as you can without pain  Hold this position for as long as directed  Do not raise your arm over your head unless your healthcare provider says it is okay            · Shoulder adduction:  Lie on your back on a firm surface  Extend your arm out to a "T " Bend your elbow so your forearm in the air  Hold a weight in your hand  Slowly raise your arm toward the ceiling and straighten your elbow  Hold this position for as long as directed  Slowly return to the starting position  How to exercise with an exercise band:   · Shoulder abduction:  Wrap the exercise band around a heavy, stable object near your foot  Grab the band with the hand of your injured shoulder  Keep your arm straight  Slowly raise your arm to the side with your thumb pointing up  Then, slowly pull the band over your head as far as you can without pain  Do not raise your arm over your head unless your healthcare provider says it is okay  Do not let your shoulder shrug  Hold this position for as long as directed  Slowly return to the starting position  · Shoulder adduction:  Wrap the exercise band around a heavy, stable object  Stand and face away from where the band is anchored  Hold each end of the band in both hands with your elbows bent  Your elbows should not be behind your body  Keep your arms parallel to the floor and slowly straighten your elbows  Hold this position for as long as directed  Slowly return to the starting position  Follow up with your physical therapist as directed:  Write down your questions so you remember to ask them at your visits  © Copyright [employer] 2022 Information is for End User's use only and may not be sold, redistributed or otherwise used for commercial purposes  All illustrations and images included in CareNotes® are the copyrighted property of [employer]  or Lj Jeron[employer]   The above information is an  only  It is not intended as medical advice for individual conditions or treatments  Talk to your doctor, nurse or pharmacist before following any medical regimen to see if it is safe and effective for you      Wellness Visit for Adults AMBULATORY CARE:   A wellness visit  is when you see your healthcare provider to get screened for health problems  Your healthcare provider will also give you advice on how to stay healthy  Write down your questions so you remember to ask them  Ask your healthcare provider how often you should have a wellness visit  What happens at a wellness visit:  Your healthcare provider will ask about your health, and your family history of health problems  This includes high blood pressure, heart disease, and cancer  He or she will ask if you have symptoms that concern you, if you smoke, and about your mood  You may also be asked about your intake of medicines, supplements, food, and alcohol  Any of the following may be done:  · Your weight  will be checked  Your height may also be checked so your body mass index (BMI) can be calculated  Your BMI shows if you are at a healthy weight  · Your blood pressure  and heart rate will be checked  Your temperature may also be checked  · Blood and urine tests  may be done  Blood tests may be done to check your cholesterol levels  Abnormal cholesterol levels increase your risk for heart disease and stroke  You may also need a blood or urine test to check for diabetes if you are at increased risk  Urine tests may be done to look for signs of an infection or kidney disease  · A physical exam  includes checking your heartbeat and lungs with a stethoscope  Your healthcare provider may also check your skin to look for sun damage  · Screening tests  may be recommended  A screening test is done to check for diseases that may not cause symptoms  The screening tests you may need depend on your age, gender, family history, and lifestyle habits  For example, colorectal screening may be recommended if you are 48years old or older  Screening tests you need if you are a woman:   · A Pap smear  is used to screen for cervical cancer   Pap smears are usually done every 3 to 5 years depending on your age  You may need them more often if you have had abnormal Pap smear test results in the past  Ask your healthcare provider how often you should have a Pap smear  · A mammogram  is an x-ray of your breasts to screen for breast cancer  Experts recommend mammograms every 2 years starting at age 48 years  You may need a mammogram at age 52 years or younger if you have an increased risk for breast cancer  Talk to your healthcare provider about when you should start having mammograms and how often you need them  Vaccines you may need:   · Get an influenza vaccine  every year  The influenza vaccine protects you from the flu  Several types of viruses cause the flu  The viruses change over time, so new vaccines are made each year  · Get a tetanus-diphtheria (Td) booster vaccine  every 10 years  This vaccine protects you against tetanus and diphtheria  Tetanus is a severe infection that may cause painful muscle spasms and lockjaw  Diphtheria is a severe bacterial infection that causes a thick covering in the back of your mouth and throat  · Get a human papillomavirus (HPV) vaccine  if you are female and aged 23 to 32 or male 23 to 24 and never received it  This vaccine protects you from HPV infection  HPV is the most common infection spread by sexual contact  HPV may also cause vaginal, penile, and anal cancers  · Get a pneumococcal vaccine  if you are aged 72 years or older  The pneumococcal vaccine is an injection given to protect you from pneumococcal disease  Pneumococcal disease is an infection caused by pneumococcal bacteria  The infection may cause pneumonia, meningitis, or an ear infection  · Get a shingles vaccine  if you are 60 or older, even if you have had shingles before  The shingles vaccine is an injection to protect you from the varicella-zoster virus  This is the same virus that causes chickenpox   Shingles is a painful rash that develops in people who had chickenpox or have been exposed to the virus  How to eat healthy:  My Plate is a model for planning healthy meals  It shows the types and amounts of foods that should go on your plate  Fruits and vegetables make up about half of your plate, and grains and protein make up the other half  A serving of dairy is included on the side of your plate  The amount of calories and serving sizes you need depends on your age, gender, weight, and height  Examples of healthy foods are listed below:  · Eat a variety of vegetables  such as dark green, red, and orange vegetables  You can also include canned vegetables low in sodium (salt) and frozen vegetables without added butter or sauces  · Eat a variety of fresh fruits , canned fruit in 100% juice, frozen fruit, and dried fruit  · Include whole grains  At least half of the grains you eat should be whole grains  Examples include whole-wheat bread, wheat pasta, brown rice, and whole-grain cereals such as oatmeal     · Eat a variety of protein foods such as seafood (fish and shellfish), lean meat, and poultry without skin (turkey and chicken)  Examples of lean meats include pork leg, shoulder, or tenderloin, and beef round, sirloin, tenderloin, and extra lean ground beef  Other protein foods include eggs and egg substitutes, beans, peas, soy products, nuts, and seeds  · Choose low-fat dairy products such as skim or 1% milk or low-fat yogurt, cheese, and cottage cheese  · Limit unhealthy fats  such as butter, hard margarine, and shortening  Exercise:  Exercise at least 30 minutes per day on most days of the week  Some examples of exercise include walking, biking, dancing, and swimming  You can also fit in more physical activity by taking the stairs instead of the elevator or parking farther away from stores  Include muscle strengthening activities 2 days each week  Regular exercise provides many health benefits   It helps you manage your weight, and decreases your risk for type 2 diabetes, heart disease, stroke, and high blood pressure  Exercise can also help improve your mood  Ask your healthcare provider about the best exercise plan for you  General health and safety guidelines:   · Do not smoke  Nicotine and other chemicals in cigarettes and cigars can cause lung damage  Ask your healthcare provider for information if you currently smoke and need help to quit  E-cigarettes or smokeless tobacco still contain nicotine  Talk to your healthcare provider before you use these products  · Limit alcohol  A drink of alcohol is 12 ounces of beer, 5 ounces of wine, or 1½ ounces of liquor  · Lose weight, if needed  Being overweight increases your risk of certain health conditions  These include heart disease, high blood pressure, type 2 diabetes, and certain types of cancer  · Protect your skin  Do not sunbathe or use tanning beds  Use sunscreen with a SPF 15 or higher  Apply sunscreen at least 15 minutes before you go outside  Reapply sunscreen every 2 hours  Wear protective clothing, hats, and sunglasses when you are outside  · Drive safely  Always wear your seatbelt  Make sure everyone in your car wears a seatbelt  A seatbelt can save your life if you are in an accident  Do not use your cell phone when you are driving  This could distract you and cause an accident  Pull over if you need to make a call or send a text message  · Practice safe sex  Use latex condoms if are sexually active and have more than one partner  Your healthcare provider may recommend screening tests for sexually transmitted infections (STIs)  · Wear helmets, lifejackets, and protective gear  Always wear a helmet when you ride a bike or motorcycle, go skiing, or play sports that could cause a head injury  Wear protective equipment when you play sports  Wear a lifejacket when you are on a boat or doing water sports      © Copyright [employer] 2022 Information is for End User's use only and may not be sold, redistributed or otherwise used for commercial purposes  All illustrations and images included in CareNotes® are the copyrighted property of [employer] Inc  or Lj Torres  The above information is an  only  It is not intended as medical advice for individual conditions or treatments  Talk to your doctor, nurse or pharmacist before following any medical regimen to see if it is safe and effective for you

## 2022-10-20 DIAGNOSIS — N39.41 URGE INCONTINENCE: ICD-10-CM

## 2022-10-20 RX ORDER — SOLIFENACIN SUCCINATE 10 MG/1
TABLET, FILM COATED ORAL
Qty: 90 TABLET | Refills: 1 | Status: SHIPPED | OUTPATIENT
Start: 2022-10-20

## 2022-11-30 ENCOUNTER — OFFICE VISIT (OUTPATIENT)
Dept: FAMILY MEDICINE CLINIC | Facility: CLINIC | Age: 62
End: 2022-11-30

## 2022-11-30 VITALS
OXYGEN SATURATION: 98 % | BODY MASS INDEX: 36 KG/M2 | HEIGHT: 66 IN | WEIGHT: 224 LBS | TEMPERATURE: 98.8 F | DIASTOLIC BLOOD PRESSURE: 78 MMHG | SYSTOLIC BLOOD PRESSURE: 120 MMHG | HEART RATE: 82 BPM

## 2022-11-30 DIAGNOSIS — R73.03 PREDIABETES: ICD-10-CM

## 2022-11-30 DIAGNOSIS — Z11.59 ENCOUNTER FOR HEPATITIS C SCREENING TEST FOR LOW RISK PATIENT: ICD-10-CM

## 2022-11-30 DIAGNOSIS — E89.0 POSTOPERATIVE HYPOTHYROIDISM: ICD-10-CM

## 2022-11-30 DIAGNOSIS — J45.20 MILD INTERMITTENT REACTIVE AIRWAY DISEASE WITHOUT COMPLICATION: Primary | ICD-10-CM

## 2022-11-30 DIAGNOSIS — E78.00 HYPERCHOLESTEREMIA: ICD-10-CM

## 2022-11-30 RX ORDER — ALBUTEROL SULFATE 90 UG/1
2 AEROSOL, METERED RESPIRATORY (INHALATION) EVERY 6 HOURS PRN
Qty: 18 G | Refills: 2 | Status: SHIPPED | OUTPATIENT
Start: 2022-11-30

## 2022-11-30 NOTE — PROGRESS NOTES
Name: Heber Lovelace      : 1960      MRN: [id number]  Encounter Provider: Devin Agrawal MD  Encounter Date: 2022   Encounter department: [address] [place] [address] Road     1  Mild intermittent reactive airway disease without complication  -     albuterol (Ventolin HFA) 90 mcg/act inhaler; Inhale 2 puffs every 6 (six) hours as needed for wheezing    2  Prediabetes  -     Hemoglobin A1C; Future  -     Comprehensive metabolic panel; Future  -     CBC; Future; Expected date: 2022    3  Encounter for hepatitis C screening test for low risk patient  -     Hepatitis C antibody; Future    4  Hypercholesteremia  -     Lipid Panel with Direct LDL reflex; Future  -     Comprehensive metabolic panel; Future    5  Postoperative hypothyroidism  -     TSH, 3rd generation with Free T4 reflex; Future; Expected date: 2022  continue supportive care  Albuterol sent for history of reactive airway disease        Subjective      Patient started 1 week ago with congestion, cough with green mucus, fatigue and body aches  She also had some nausea and diarrhea  Review of Systems   Constitutional: Positive for activity change, appetite change and fatigue  Negative for chills and fever  HENT: Positive for congestion, sinus pressure and sore throat  Negative for rhinorrhea  Eyes: Negative for visual disturbance  Respiratory: Positive for cough and wheezing  Negative for shortness of breath  Cardiovascular: Negative for chest pain and palpitations  Gastrointestinal: Positive for diarrhea and nausea  Negative for abdominal pain, blood in stool, constipation and vomiting  Genitourinary: Negative for dysuria  Musculoskeletal: Positive for myalgias  Negative for arthralgias  Skin: Negative for rash  Neurological: Negative for dizziness, weakness and headaches  All other systems reviewed and are negative        Current Outpatient Medications on File Prior to Visit   Medication Sig   • atorvastatin (LIPITOR) 10 mg tablet take 1 tablet by mouth once daily   • celecoxib (CeleBREX) 200 mg capsule Take 200 mg by mouth 2 (two) times a day As needed    • cetirizine (ZyrTEC) 10 mg tablet as needed     • famotidine (PEPCID) 40 MG tablet Take 1 tablet (40 mg total) by mouth daily at bedtime   • fluticasone (FLONASE) 50 mcg/act nasal spray 2 sprays into each nostril as needed for rhinitis   • omeprazole (PriLOSEC) 40 MG capsule Take 1 capsule (40 mg total) by mouth every morning 30 min before breakfast   • solifenacin (VESICARE) 10 MG tablet take 1 tablet by mouth once daily   • Synthroid 200 MCG tablet take 1 tablet by mouth every morning   • valsartan-hydrochlorothiazide (DIOVAN-HCT) 80-12 5 MG per tablet take 1 tablet by mouth once daily       Objective     /78 (BP Location: Left arm, Patient Position: Sitting, Cuff Size: Large)   Pulse 82   Temp 98 8 °F (37 1 °C)   Ht 5' 6" (1 676 m)   Wt 102 kg (224 lb)   SpO2 98%   BMI 36 15 kg/m²     Physical Exam  Vitals and nursing note reviewed  Constitutional:       General: She is not in acute distress  Vital signs are normal       Appearance: Normal appearance  She is well-developed and well-nourished  She is obese  She is not ill-appearing or diaphoretic  HENT:      Head: Normocephalic and atraumatic  Right Ear: External ear normal       Left Ear: External ear normal       Nose: Nose normal    Eyes:      General: Lids are normal       Extraocular Movements: EOM normal       Conjunctiva/sclera: Conjunctivae normal    Neck:      Vascular: No JVD  Trachea: No tracheal deviation  Cardiovascular:      Rate and Rhythm: Normal rate and regular rhythm  Pulses: Normal pulses and intact distal pulses  Heart sounds: Normal heart sounds  No murmur heard  Pulmonary:      Effort: Pulmonary effort is normal  No accessory muscle usage or respiratory distress  Breath sounds: Decreased breath sounds present   No wheezing, rhonchi or rales  Lymphadenopathy:      Cervical: No cervical adenopathy  Skin:     Findings: No rash  Neurological:      Mental Status: She is alert     Psychiatric:         Mood and Affect: Mood and affect normal        Jama Mayers MD

## 2023-01-03 DIAGNOSIS — E89.0 POSTOPERATIVE HYPOTHYROIDISM: ICD-10-CM

## 2023-01-03 DIAGNOSIS — E78.00 HYPERCHOLESTEREMIA: ICD-10-CM

## 2023-01-03 RX ORDER — ATORVASTATIN CALCIUM 10 MG/1
TABLET, FILM COATED ORAL
Qty: 90 TABLET | Refills: 1 | Status: SHIPPED | OUTPATIENT
Start: 2023-01-03

## 2023-01-03 RX ORDER — LEVOTHYROXINE SODIUM 200 MCG
TABLET ORAL
Qty: 90 TABLET | Refills: 1 | Status: SHIPPED | OUTPATIENT
Start: 2023-01-03

## 2023-01-05 DIAGNOSIS — I10 ESSENTIAL HYPERTENSION: ICD-10-CM

## 2023-01-05 RX ORDER — VALSARTAN AND HYDROCHLOROTHIAZIDE 80; 12.5 MG/1; MG/1
TABLET, FILM COATED ORAL
Qty: 90 TABLET | Refills: 0 | Status: SHIPPED | OUTPATIENT
Start: 2023-01-05

## 2023-01-19 ENCOUNTER — CLINICAL SUPPORT (OUTPATIENT)
Dept: FAMILY MEDICINE CLINIC | Facility: CLINIC | Age: 63
End: 2023-01-19

## 2023-01-19 DIAGNOSIS — Z23 IMMUNIZATION DUE: Primary | ICD-10-CM

## 2023-02-09 ENCOUNTER — OFFICE VISIT (OUTPATIENT)
Dept: ENDOCRINOLOGY | Facility: CLINIC | Age: 63
End: 2023-02-09

## 2023-02-09 VITALS
WEIGHT: 222.38 LBS | DIASTOLIC BLOOD PRESSURE: 70 MMHG | HEIGHT: 66 IN | HEART RATE: 72 BPM | BODY MASS INDEX: 35.74 KG/M2 | SYSTOLIC BLOOD PRESSURE: 118 MMHG

## 2023-02-09 DIAGNOSIS — E89.0 POSTOPERATIVE HYPOTHYROIDISM: Primary | ICD-10-CM

## 2023-02-09 DIAGNOSIS — E66.9 OBESITY (BMI 35.0-39.9 WITHOUT COMORBIDITY): ICD-10-CM

## 2023-02-09 DIAGNOSIS — C73 THYROID CANCER (HCC): ICD-10-CM

## 2023-02-09 RX ORDER — LEVOTHYROXINE SODIUM 200 MCG
200 TABLET ORAL EVERY MORNING
Qty: 90 TABLET | Refills: 1 | Status: SHIPPED | OUTPATIENT
Start: 2023-02-09

## 2023-02-09 NOTE — PROGRESS NOTES
Assumed care of pt. Pt resting in stretcher in low/locked position and call bell within reach. Introduced self as primary nurse. Discussed plan of care with patient. Opportunity to ask questions given. Patient advised that medical information will be discussed and it is their own responsibility to let nurse know if such conversation should not take place in presence of visitors. Pt verbalizes understanding. Pt. Denies any additional complaints at this time. 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  Prior to diagnosis of thyroid cancer, she had a thyroid nodule and hyperthyroidism  She received radioactive iodine treatment for hyperthyroidism and the thyroid nodule did not resolve  Subsequently, she underwent a fine-needle aspirate followed by a right hemithyroidectomy  The pathology showed follicular variant papillary thyroid cancer with foci suggestive of lymphovascular invasion  3 months later, she underwent completion thyroidectomy  This was followed by I-131 therapy  Since her treatment she has had a hoarse voice and she did see ENT for this  Did not complete thyroglobulin panel last year  Reviewed records from previous endocrinologists from [place] in care everywhere from 2014/2015    For Hypothyroidism, taking Synthroid 200mcg daily  Feeling well  She does take brand name medication and reports high cost of medication  For Obesity, she was following with weight management last year and plans to return this summer  She has gotten an exercise bike and uses this for 30 minutes every other day  She does have FH of Diabetes in her grandmothers  She follows with her PCP and taking medication for HTN and Hyperlipidemia         Patient Active Problem List   Diagnosis   • Allergic rhinitis   • Arthropathy, multiple sites   • History of malignant neoplasm of thyroid   • Essential hypertension   • Postoperative hypothyroidism   • Neuropathy   • Hypercholesteremia   • Urge incontinence   • Chronic pain of both knees   • hx Thyroid cancer   • Dysphonia   • Gastroesophageal reflux disease   • Paresis of right vocal fold   • Reflux laryngitis   • Glottic insufficiency   • Muscle tension dysphonia   • Xerostomia   • Hashimoto's thyroiditis   • Pharyngoesophageal dysphagia   • Hx of total thyroidectomy   • Obesity (BMI 35 0-39 9 without comorbidity)      Past Medical History:   Diagnosis Date   • Arthritis    • Asthma    • BRCA1 negative    • BRCA2 negative    • Cancer (HCC)     thyroid cancer   • Colon polyp    • Disease of thyroid gland    • Fibroid    • History of COVID-19 01/04/2022    mild cold s/s   • Hyperlipidemia    • Hypertension    • Hypothyroidism    • PONV (postoperative nausea and vomiting)    • Postmenopausal bleeding 11/23/2021   • Seasonal allergies    • Varicella       Past Surgical History:   Procedure Laterality Date   • ADENOIDECTOMY     • BREAST CYST EXCISION Right 1991    Benign   • OH HYSTEROSCOPY BX ENDOMETRIUM&/POLYPC W/WO D&C N/A 03/07/2022    Procedure: DILATATION AND CURETTAGE (D&C) WITH HYSTEROSCOPY, polypectomy;  Surgeon: Aristides Sena MD;  Location: [facility] MAIN OR;  Service: Gynecology   • THYROIDECTOMY     • TONSILLECTOMY     • VARICOSE VEIN SURGERY Bilateral       Family History   Problem Relation Age of Onset   • Breast cancer Mother 48   • Heart disease Mother    • Heart disease Father    • Stroke Father    • Kidney disease Father    • Breast cancer Sister 48   • Hypertension Sister    • No Known Problems Daughter    • No Known Problems Daughter    • Diabetes unspecified Maternal Grandmother    • Prostate cancer Maternal Grandfather    • Diabetes Maternal Aunt    • Diabetes Maternal Uncle    • Diabetes Paternal Aunt    • Breast cancer Cousin 39     Social History     Tobacco Use   • Smoking status: Never   • Smokeless tobacco: Never   Substance Use Topics   • Alcohol use:  Yes     Alcohol/week: 1 0 standard drink     Types: 1 Glasses of wine per week     Comment: Only on holidays     Allergies   Allergen Reactions   • Sulfa Antibiotics Rash       Current Outpatient Medications:   •  albuterol (Ventolin HFA) 90 mcg/act inhaler, Inhale 2 puffs every 6 (six) hours as needed for wheezing, Disp: 18 g, Rfl: 2  •  atorvastatin (LIPITOR) 10 mg tablet, take 1 tablet by mouth once daily, Disp: 90 tablet, Rfl: 1  •  celecoxib (CeleBREX) 200 mg capsule, Take 200 mg by mouth 2 (two) times a day As needed , Disp: , Rfl:   •  cetirizine (ZyrTEC) 10 mg tablet, as needed  , Disp: , Rfl:   •  famotidine (PEPCID) 40 MG tablet, Take 1 tablet (40 mg total) by mouth daily at bedtime, Disp: 30 tablet, Rfl: 11  •  fluticasone (FLONASE) 50 mcg/act nasal spray, 2 sprays into each nostril as needed for rhinitis, Disp: 16 g, Rfl: 3  •  omeprazole (PriLOSEC) 40 MG capsule, Take 1 capsule (40 mg total) by mouth every morning 30 min before breakfast, Disp: 30 capsule, Rfl: 11  •  solifenacin (VESICARE) 10 MG tablet, take 1 tablet by mouth once daily, Disp: 90 tablet, Rfl: 1  •  Synthroid 200 MCG tablet, Take 1 tablet (200 mcg total) by mouth every morning, Disp: 90 tablet, Rfl: 1  •  valsartan-hydrochlorothiazide (DIOVAN-HCT) 80-12 5 MG per tablet, take 1 tablet by mouth once daily, Disp: 90 tablet, Rfl: 0    Review of Systems   Constitutional: Negative for activity change, appetite change, chills, diaphoresis, fatigue, fever and unexpected weight change  HENT: Negative for trouble swallowing and voice change  Eyes: Negative for visual disturbance  Respiratory: Negative for shortness of breath  Cardiovascular: Negative for chest pain and palpitations  Gastrointestinal: Negative for abdominal pain, constipation and diarrhea  Endocrine: Negative for cold intolerance, heat intolerance, polydipsia, polyphagia and polyuria  Genitourinary: Negative for frequency and menstrual problem  Musculoskeletal: Negative for arthralgias and myalgias  Skin: Negative for rash  Allergic/Immunologic: Negative for food allergies  Neurological: Negative for dizziness and tremors  Hematological: Negative for adenopathy  Psychiatric/Behavioral: Negative for sleep disturbance  All other systems reviewed and are negative        Physical Exam:  Body mass index is 35 89 kg/m²  /70 (BP Location: Left arm, Patient Position: Sitting, Cuff Size: Large)   Pulse 72   Ht 5' 6" (1 676 m)   Wt 101 kg (222 lb 6 oz)   BMI 35 89 kg/m²    Wt Readings from Last 3 Encounters:   02/09/23 101 kg (222 lb 6 oz)   11/30/22 102 kg (224 lb)   10/19/22 103 kg (227 lb)       Physical Exam  Vitals reviewed  Constitutional:       General: She is not in acute distress  Appearance: She is well-developed  HENT:      Head: Normocephalic and atraumatic  Eyes:      Conjunctiva/sclera: Conjunctivae normal       Pupils: Pupils are equal, round, and reactive to light  Neck:      Thyroid: No thyromegaly  Cardiovascular:      Rate and Rhythm: Normal rate and regular rhythm  Heart sounds: Normal heart sounds  Pulmonary:      Effort: Pulmonary effort is normal  No respiratory distress  Breath sounds: Normal breath sounds  No wheezing or rales  Abdominal:      General: Bowel sounds are normal  There is no distension  Palpations: Abdomen is soft  Tenderness: There is no abdominal tenderness  Musculoskeletal:         General: Normal range of motion  Cervical back: Normal range of motion and neck supple  Skin:     General: Skin is warm and dry  Neurological:      Mental Status: She is alert and oriented to person, place, and time           Labs:     Component      Latest Ref Rng & Units 10/11/2022 10/11/2022 10/11/2022           9:48 AM  9:48 AM  9:48 AM   Glucose, Random      70 - 99 mg/dL 90     BUN      8 - 27 mg/dL 20     Creatinine      0 57 - 1 00 mg/dL 0 87     eGFR      >59 mL/min/1 73 75     SL AMB BUN/CREATININE RATIO      12 - 28 23     Sodium      134 - 144 mmol/L 142     Potassium      3 5 - 5 2 mmol/L 4 2     Chloride      96 - 106 mmol/L 105     CO2      20 - 29 mmol/L 27     CALCIUM      8 7 - 10 3 mg/dL 9 5     Total Protein      6 0 - 8 5 g/dL 6 7     Albumin      3 8 - 4 8 g/dL 4 4     Globulin, Total      1 5 - 4 5 g/dL 2 3     Albumin/Globulin Ratio      1 2 - 2 2 1 9     TOTAL BILIRUBIN      0 0 - 1 2 mg/dL 0 3     ALKALINE PHOSPHATASE ISOENZYMES      44 - 121 IU/L 123 (H)     AST      0 - 40 IU/L 22     ALT      0 - 32 IU/L 19     Cholesterol      100 - 199 mg/dL  150    Triglycerides      0 - 149 mg/dL  121    HDL      >39 mg/dL  45    LDL Calculated      0 - 99 mg/dL  83    Hemoglobin A1C      4 8 - 5 6 %   5 7 (H)   TSH, POC      0 450 - 4 500 uIU/mL        Component      Latest Ref Rng & Units 10/11/2022           9:48 AM   Glucose, Random      70 - 99 mg/dL    BUN      8 - 27 mg/dL    Creatinine      0 57 - 1 00 mg/dL    eGFR      >59 mL/min/1 73    SL AMB BUN/CREATININE RATIO      12 - 28    Sodium      134 - 144 mmol/L    Potassium      3 5 - 5 2 mmol/L    Chloride      96 - 106 mmol/L    CO2      20 - 29 mmol/L    CALCIUM      8 7 - 10 3 mg/dL    Total Protein      6 0 - 8 5 g/dL    Albumin      3 8 - 4 8 g/dL    Globulin, Total      1 5 - 4 5 g/dL    Albumin/Globulin Ratio      1 2 - 2 2    TOTAL BILIRUBIN      0 0 - 1 2 mg/dL    ALKALINE PHOSPHATASE ISOENZYMES      44 - 121 IU/L    AST      0 - 40 IU/L    ALT      0 - 32 IU/L    Cholesterol      100 - 199 mg/dL    Triglycerides      0 - 149 mg/dL    HDL      >39 mg/dL    LDL Calculated      0 - 99 mg/dL    Hemoglobin A1C      4 8 - 5 6 %    TSH, POC      0 450 - 4 500 uIU/mL 2 150       **per previous endocrine notes  Labs:  Tg<0 2, neg ab, with TSH 39 47 6/9/10     2/18/14 TSH 3 46, FT4 0 9, Tg<0 2, tg ab neg    5/27/14 TSh 0 19, FT4 1 4     11/18/14 TSH 1 48, TT4 9 8   1/5/15 TSH 0 42, FT4 1 4, tg <0 1, tg ab 7 (+)- change in assay  6/17/15 TSH 0 21, FT4 1 4, Tg<0 1, tg ab 5 (+)  12/9/15 TSH 1 16, FT4 1 1, tg<0 1, tg ab 7 (+)  Impression & Plan:    Problem List Items Addressed This Visit        Endocrine    Postoperative hypothyroidism - Primary     Continue Synthroid   Check TSH/Free T4- goal TSH 0 5 to 2 0  Will send RX to [facility] for cost savings  Relevant Medications    Synthroid 200 MCG tablet    Other Relevant Orders    TSH, 3rd generation    T4, free    Thyroglobulin    TSH, 3rd generation    Thyroglobulin    T4, free    hx Thyroid cancer     Advised her to check Thyroglobulin Panel and based on results will determine if additional testing is necessary  Relevant Medications    Synthroid 200 MCG tablet    Other Relevant Orders    TSH, 3rd generation    T4, free    Thyroglobulin    TSH, 3rd generation    Thyroglobulin    T4, free       Other    Obesity (BMI 35 0-39 9 without comorbidity)     She is at increased risk of Developing Type 2 Diabetes due to Elevated A1C, Obesity, and FH of Diabetes  Discussed importance of weight loss to reduce risk of developing Type 2 Diabetes  She has recently starting exercising 30 minutes every other day and plans to follow up with weight management center in the summer  Orders Placed This Encounter   Procedures   • TSH, 3rd generation     This is a patient instruction: This test is non-fasting  Please drink two glasses of water morning of bloodwork  Standing Status:   Future     Standing Expiration Date:   8/9/2023   • T4, free     Standing Status:   Future     Standing Expiration Date:   8/9/2023   • Thyroglobulin     Thyroglobulin PANEL-- includes both quantitative thyroglobulin and thyroglobulin Antibody     Standing Status:   Future     Standing Expiration Date:   8/9/2023   • TSH, 3rd generation     This is a patient instruction: This test is non-fasting  Please drink two glasses of water morning of bloodwork          Standing Status:   Future     Standing Expiration Date:   8/9/2024   • Thyroglobulin     Standing Status:   Future     Standing Expiration Date:   8/9/2024   • T4, free     Standing Status:   Future     Standing Expiration Date:   8/9/2024       Patient Instructions   Tesha mac  Or Call [phone]           Discussed with the patient and all questioned fully answered  She will call me if any problems arise  Follow-up appointment in 12 months       Counseled patient on diagnostic results, prognosis, risk and benefit of treatment options, instruction for management, importance of treatment compliance, Risk  factor reduction and impressions      Elizabeth Salazar PA-C

## 2023-02-09 NOTE — ASSESSMENT & PLAN NOTE
She is at increased risk of Developing Type 2 Diabetes due to Elevated A1C, Obesity, and FH of Diabetes  Discussed importance of weight loss to reduce risk of developing Type 2 Diabetes  She has recently starting exercising 30 minutes every other day and plans to follow up with weight management center in the summer

## 2023-02-09 NOTE — ASSESSMENT & PLAN NOTE
Continue Synthroid  Check TSH/Free T4- goal TSH 0 5 to 2 0  Will send RX to [facility] for cost savings

## 2023-02-09 NOTE — ASSESSMENT & PLAN NOTE
Advised her to check Thyroglobulin Panel and based on results will determine if additional testing is necessary

## 2023-02-22 ENCOUNTER — OFFICE VISIT (OUTPATIENT)
Dept: FAMILY MEDICINE CLINIC | Facility: CLINIC | Age: 63
End: 2023-02-22

## 2023-02-22 VITALS
OXYGEN SATURATION: 98 % | HEIGHT: 66 IN | TEMPERATURE: 97.8 F | DIASTOLIC BLOOD PRESSURE: 72 MMHG | WEIGHT: 222.5 LBS | HEART RATE: 71 BPM | BODY MASS INDEX: 35.76 KG/M2 | SYSTOLIC BLOOD PRESSURE: 126 MMHG

## 2023-02-22 DIAGNOSIS — R10.11 RIGHT UPPER QUADRANT ABDOMINAL PAIN: Primary | ICD-10-CM

## 2023-02-22 RX ORDER — ACETAMINOPHEN 325 MG/1
TABLET ORAL
COMMUNITY
Start: 2023-02-17 | End: 2023-02-27

## 2023-02-22 RX ORDER — OXYCODONE HYDROCHLORIDE 5 MG/1
TABLET ORAL
COMMUNITY
Start: 2023-02-17 | End: 2023-02-22 | Stop reason: ALTCHOICE

## 2023-02-22 RX ORDER — IBUPROFEN 200 MG
TABLET ORAL
COMMUNITY
Start: 2023-02-17 | End: 2023-02-27

## 2023-02-22 NOTE — PROGRESS NOTES
Name: Ramy Martinez      : 1960      MRN: [id number]  Encounter Provider: Lgoan Fontanez MD  Encounter Date: 2023   Encounter department: [address] [place] [address]     1  Right upper quadrant abdominal pain  I would encourage her to follow the recommendations of the surgeon - obtain the US Kidneys, and defer surgery for now due to single episode and lack of other symptoms   F/u as needed        Subjective      HPI Patient present to review recent ER visit and subsequent surgical consultation  We reviewed the results, consultation notes, and imaging in detail today  Review of Systems   Constitutional: Negative for activity change, chills and fever  HENT: Negative for congestion, rhinorrhea and sore throat  Eyes: Negative for visual disturbance  Respiratory: Negative for cough, shortness of breath and wheezing  Cardiovascular: Negative for chest pain and palpitations  Gastrointestinal: Negative for abdominal pain, blood in stool, constipation, diarrhea, nausea and vomiting  Genitourinary: Negative for dysuria  Musculoskeletal: Negative for arthralgias and myalgias  Skin: Negative for rash  Neurological: Negative for dizziness, weakness and headaches  All other systems reviewed and are negative  Current Outpatient Medications on File Prior to Visit   Medication Sig   • acetaminophen (TYLENOL) 325 mg tablet Indications- PAIN: Take 2 tablets every 4 hours as needed for pain for 3 days, then 2 tablets every 4 hours as needed for pain for 4 days     • albuterol (Ventolin HFA) 90 mcg/act inhaler Inhale 2 puffs every 6 (six) hours as needed for wheezing   • atorvastatin (LIPITOR) 10 mg tablet take 1 tablet by mouth once daily   • cetirizine (ZyrTEC) 10 mg tablet as needed     • famotidine (PEPCID) 40 MG tablet Take 1 tablet (40 mg total) by mouth daily at bedtime   • fluticasone (FLONASE) 50 mcg/act nasal spray 2 sprays into each nostril as needed for rhinitis   • ibuprofen (MOTRIN) 200 mg tablet Indications- PAIN: Take 2 tablets by mouth 4 times a day as needed for pain for 3 days, then 3 times a day as needed for pain for 4 days  • omeprazole (PriLOSEC) 40 MG capsule Take 1 capsule (40 mg total) by mouth every morning 30 min before breakfast   • solifenacin (VESICARE) 10 MG tablet take 1 tablet by mouth once daily   • Synthroid 200 MCG tablet Take 1 tablet (200 mcg total) by mouth every morning   • valsartan-hydrochlorothiazide (DIOVAN-HCT) 80-12 5 MG per tablet take 1 tablet by mouth once daily   • [DISCONTINUED] celecoxib (CeleBREX) 200 mg capsule Take 200 mg by mouth 2 (two) times a day As needed  (Patient not taking: Reported on 2/22/2023)   • [DISCONTINUED] oxyCODONE (ROXICODONE) 5 immediate release tablet take 1 tablet by mouth every 6 hours ON DAY 1 if needed for MODER     (REFER TO PRESCRIPTION NOTES)  (Patient not taking: Reported on 2/22/2023)       Objective     /72   Pulse 71   Temp 97 8 °F (36 6 °C)   Ht 5' 6" (1 676 m)   Wt 101 kg (222 lb 8 oz)   SpO2 98%   BMI 35 91 kg/m²     Physical Exam  Vitals and nursing note reviewed  Constitutional:       General: She is not in acute distress  Appearance: Normal appearance  She is well-developed  She is obese  She is not ill-appearing or diaphoretic  HENT:      Head: Normocephalic and atraumatic  Right Ear: External ear normal       Left Ear: External ear normal       Nose: Nose normal    Eyes:      General: Lids are normal       Conjunctiva/sclera: Conjunctivae normal    Neck:      Vascular: No JVD  Trachea: No tracheal deviation  Pulmonary:      Effort: No accessory muscle usage or respiratory distress  Skin:     Findings: No rash  Neurological:      Mental Status: She is alert         Rosmery Li MD

## 2023-03-06 ENCOUNTER — APPOINTMENT (OUTPATIENT)
Dept: RADIOLOGY | Facility: MEDICAL CENTER | Age: 63
End: 2023-03-06

## 2023-03-06 ENCOUNTER — TELEPHONE (OUTPATIENT)
Dept: OBGYN CLINIC | Facility: HOSPITAL | Age: 63
End: 2023-03-06

## 2023-03-06 ENCOUNTER — OFFICE VISIT (OUTPATIENT)
Dept: URGENT CARE | Facility: MEDICAL CENTER | Age: 63
End: 2023-03-06

## 2023-03-06 VITALS
DIASTOLIC BLOOD PRESSURE: 86 MMHG | HEART RATE: 68 BPM | SYSTOLIC BLOOD PRESSURE: 166 MMHG | RESPIRATION RATE: 18 BRPM | TEMPERATURE: 98.7 F | WEIGHT: 221 LBS | OXYGEN SATURATION: 96 % | HEIGHT: 66 IN | BODY MASS INDEX: 35.52 KG/M2

## 2023-03-06 DIAGNOSIS — S66.911A SPRAIN AND STRAIN OF RIGHT WRIST: ICD-10-CM

## 2023-03-06 DIAGNOSIS — S66.911A SPRAIN AND STRAIN OF RIGHT WRIST: Primary | ICD-10-CM

## 2023-03-06 DIAGNOSIS — S63.501A SPRAIN AND STRAIN OF RIGHT WRIST: Primary | ICD-10-CM

## 2023-03-06 DIAGNOSIS — S63.501A SPRAIN AND STRAIN OF RIGHT WRIST: ICD-10-CM

## 2023-03-06 NOTE — PATIENT INSTRUCTIONS
Sprain wrist  Rest, ice, elevate  Referred to orthopedics  Follow up with PCP in 3-5 days  Proceed to  ER if symptoms worsen

## 2023-03-06 NOTE — TELEPHONE ENCOUNTER
Patient is being referred to a orthopedics  Please schedule accordingly      [facility] [address]   [phone]

## 2023-03-06 NOTE — PROGRESS NOTES
[facility]        NAME: Nata Crowder is a 60 y o  female  : 1960    MRN: [id number]  DATE: 2023  TIME: 4:26 PM    Assessment and Plan   Sprain and strain of right wrist [S63 501A, S66 471A]  1  Sprain and strain of right wrist  XR wrist 3+ vw right            Patient Instructions     Sprain wrist  Rest, ice, elevate  Referred to orthopedics  Follow up with PCP in 3-5 days  Proceed to  ER if symptoms worsen  Chief Complaint     Chief Complaint   Patient presents with   • Wrist Pain     Pt  C/o right wrist pain that began yesterday when her dog pulled her while they were out walking  States the pain was much worse when she woke today  History of Present Illness       79-year-old female who presents complaining of right wrist pain  Patient states that the dog pulled on the leash yesterday and felt a jack to the right wrist   Denies fall, states that she was working yesterday and cooking and did not feel that much pain but woke up with severe pain and decreased range of motion      Review of Systems   Review of Systems   Constitutional: Negative  HENT: Negative  Eyes: Negative  Respiratory: Negative  Negative for cough, chest tightness, shortness of breath, wheezing and stridor  Cardiovascular: Negative  Negative for chest pain, palpitations and leg swelling  Musculoskeletal: Positive for arthralgias           Current Medications       Current Outpatient Medications:   •  albuterol (Ventolin HFA) 90 mcg/act inhaler, Inhale 2 puffs every 6 (six) hours as needed for wheezing, Disp: 18 g, Rfl: 2  •  atorvastatin (LIPITOR) 10 mg tablet, take 1 tablet by mouth once daily, Disp: 90 tablet, Rfl: 1  •  cetirizine (ZyrTEC) 10 mg tablet, as needed  , Disp: , Rfl:   •  famotidine (PEPCID) 40 MG tablet, Take 1 tablet (40 mg total) by mouth daily at bedtime, Disp: 30 tablet, Rfl: 11  •  fluticasone (FLONASE) 50 mcg/act nasal spray, 2 sprays into each nostril as needed for rhinitis, Disp: 16 g, Rfl: 3  •  omeprazole (PriLOSEC) 40 MG capsule, Take 1 capsule (40 mg total) by mouth every morning 30 min before breakfast, Disp: 30 capsule, Rfl: 11  •  solifenacin (VESICARE) 10 MG tablet, take 1 tablet by mouth once daily, Disp: 90 tablet, Rfl: 1  •  Synthroid 200 MCG tablet, Take 1 tablet (200 mcg total) by mouth every morning, Disp: 90 tablet, Rfl: 1  •  valsartan-hydrochlorothiazide (DIOVAN-HCT) 80-12 5 MG per tablet, take 1 tablet by mouth once daily, Disp: 90 tablet, Rfl: 0    Current Allergies     Allergies as of 03/06/2023 - Reviewed 02/22/2023   Allergen Reaction Noted   • Sulfa antibiotics Rash 08/09/2016            The following portions of the patient's history were reviewed and updated as appropriate: allergies, current medications, past family history, past medical history, past social history, past surgical history and problem list      Past Medical History:   Diagnosis Date   • Arthritis    • Asthma    • BRCA1 negative    • BRCA2 negative    • Cancer ([facility] [address] )     thyroid cancer   • Colon polyp    • Disease of thyroid gland    • Fibroid    • History of COVID-19 01/04/2022    mild cold s/s   • Hyperlipidemia    • Hypertension    • Hypothyroidism    • PONV (postoperative nausea and vomiting)    • Postmenopausal bleeding 11/23/2021   • Seasonal allergies    • Varicella        Past Surgical History:   Procedure Laterality Date   • ADENOIDECTOMY     • BREAST CYST EXCISION Right 1991    Benign   • SD HYSTEROSCOPY BX ENDOMETRIUM&/POLYPC W/WO D&C N/A 03/07/2022    Procedure: DILATATION AND CURETTAGE (D&C) WITH HYSTEROSCOPY, polypectomy;  Surgeon: Sharona Garibay MD;  [facility];  Service: Gynecology   • THYROIDECTOMY     • TONSILLECTOMY     • VARICOSE VEIN SURGERY Bilateral        Family History   Problem Relation Age of Onset   • Breast cancer Mother 48   • Heart disease Mother    • Heart disease Father    • Stroke Father    • Kidney disease Father    • Breast cancer Sister 48   • Hypertension Sister    • No Known Problems Daughter    • No Known Problems Daughter    • Diabetes unspecified Maternal Grandmother    • Prostate cancer Maternal Grandfather    • Diabetes Maternal Aunt    • Diabetes Maternal Uncle    • Diabetes Paternal Aunt    • Breast cancer Cousin 39         Medications have been verified  Objective   /86   Pulse 68   Temp 98 7 °F (37 1 °C)   Resp 18   Ht 5' 6" (1 676 m)   Wt 100 kg (221 lb)   SpO2 96%   BMI 35 67 kg/m²        Physical Exam     Physical Exam  Constitutional:       Appearance: Normal appearance  She is well-developed  HENT:      Head: Normocephalic and atraumatic  Right Ear: External ear normal       Left Ear: External ear normal       Nose: Nose normal       Mouth/Throat:      Pharynx: No oropharyngeal exudate  Cardiovascular:      Rate and Rhythm: Normal rate and regular rhythm  Heart sounds: Normal heart sounds  Pulmonary:      Effort: Pulmonary effort is normal  No respiratory distress  Breath sounds: Normal breath sounds  No wheezing or rales  Chest:      Chest wall: No tenderness  Musculoskeletal:      Right wrist: Swelling and tenderness present  No deformity, effusion, lacerations, bony tenderness, snuff box tenderness or crepitus  Decreased range of motion  Normal pulse  Left wrist: Normal       Cervical back: Normal range of motion and neck supple  Lymphadenopathy:      Cervical: No cervical adenopathy  Neurological:      Mental Status: She is alert

## 2023-03-08 ENCOUNTER — OFFICE VISIT (OUTPATIENT)
Dept: OBGYN CLINIC | Facility: CLINIC | Age: 63
End: 2023-03-08

## 2023-03-08 VITALS
SYSTOLIC BLOOD PRESSURE: 118 MMHG | DIASTOLIC BLOOD PRESSURE: 70 MMHG | WEIGHT: 219.8 LBS | BODY MASS INDEX: 35.32 KG/M2 | HEIGHT: 66 IN

## 2023-03-08 DIAGNOSIS — Z12.4 SCREENING FOR MALIGNANT NEOPLASM OF CERVIX: ICD-10-CM

## 2023-03-08 DIAGNOSIS — R92.2 DENSE BREASTS: ICD-10-CM

## 2023-03-08 DIAGNOSIS — Z91.89 AT INCREASED RISK OF BREAST CANCER: ICD-10-CM

## 2023-03-08 DIAGNOSIS — Z01.419 WELL WOMAN EXAM WITH ROUTINE GYNECOLOGICAL EXAM: Primary | ICD-10-CM

## 2023-03-08 DIAGNOSIS — Z12.31 ENCOUNTER FOR SCREENING MAMMOGRAM FOR MALIGNANT NEOPLASM OF BREAST: ICD-10-CM

## 2023-03-08 DIAGNOSIS — Z11.51 SCREENING FOR HUMAN PAPILLOMAVIRUS (HPV): ICD-10-CM

## 2023-03-08 NOTE — PROGRESS NOTES
ASSESSMENT & PLAN:   Diagnoses and all orders for this visit:    Well woman exam with routine gynecological exam  -     Liquid-based pap, screening    Screening for malignant neoplasm of cervix  -     Liquid-based pap, screening    Screening for human papillomavirus (HPV)  -     Liquid-based pap, screening    Encounter for screening mammogram for malignant neoplasm of breast  -     Mammo screening bilateral w 3d & cad; Future  -     US breast screening bilateral complete (ABUS); Future    Dense breasts  -     US breast screening bilateral complete (ABUS); Future    At increased risk of breast cancer  -     US breast screening bilateral complete (ABUS); Future    [facility] for [facility] cancer 26%  ABUS added for supplemental screening  Discussed utilizing coconut oil or aquaphor for relief of perineal itching  Discussed intermittent use and if it progresses or worsens will start utilization of coconut oil regularly or consider topical steroid use  The following were reviewed in today's visit: ASCCP guidelines, Gardisil vaccination, STD testing breast self exam, mammography screening ordered, menopause, exercise, healthy diet and colonoscopy reviewed  Patient to return to office in yearly for annual exam      All questions have been answered to her satisfaction  CC:  Annual Gynecologic Examination  Chief Complaint   Patient presents with   • Gynecologic Exam     Routine annual  Pap indicated, last pap neg pap/neg HPV 8/9/16  Mammo ordered  Pt is well, no concerns at this time  HPI: Yuko Valdez is a 60 y o  [id number] who presents for annual gynecologic examination  She has the following concerns:  None  Had a hysteroscopy and D&C performed due to postmenopausal bleeding which was notable for endometrial polyp which was resected  Patient has not had any vaginal bleeding since that was performed  Prior D&C to remove an endometrial polyp was performed in 2009    Reports perineal itching intermittently  States that it comes and resolves on its own  Wants to make sure it is not a vaginal infection  Health Maintenance:    Exercise: frequently  Breast exams/breast awareness: yes  Diet: well balanced diet  Last mammogram: 2022 - BIRADS 2  Elevated lifetime BC risk 26%  ABUS ordered  Colorectal cancer screenin - repeat in 10 years, due   Past Medical History:   Diagnosis Date   • Arthritis    • Asthma    • BRCA1 negative    • BRCA2 negative    • Cancer (HCC)     thyroid cancer   • Colon polyp    • Disease of thyroid gland    • Fibroid    • History of COVID-19 2022    mild cold s/s   • Hyperlipidemia    • Hypertension    • Hypothyroidism    • PONV (postoperative nausea and vomiting)    • Postmenopausal bleeding 2021   • Seasonal allergies    • Varicella        Past Surgical History:   Procedure Laterality Date   • ADENOIDECTOMY     • BREAST BIOPSY     • BREAST CYST EXCISION Right     Benign   • BREAST LUMPECTOMY     • CO HYSTEROSCOPY BX ENDOMETRIUM&/POLYPC W/WO D&C N/A 2022    Procedure: DILATATION AND CURETTAGE (D&C) WITH HYSTEROSCOPY, polypectomy;  Surgeon: Sanjay Valadez MD;  Location: [facility];  Service: Gynecology   • THYROIDECTOMY     • TONSILLECTOMY     • VARICOSE VEIN SURGERY Bilateral        Past OB/Gyn History:   No LMP recorded  Patient is postmenopausal     Menopausal status: postmenopausal  Menopausal symptoms: None    Last Pap: 2016 : no abnormalities  History of abnormal Pap smear: no    Patient is currently sexually active     STD testing: no  Current contraception: post menopausal status      Family History  Family History   Problem Relation Age of Onset   • Breast cancer Mother    • Heart disease Mother    • Heart disease Father    • Stroke Father    • Kidney disease Father    • Breast cancer Sister    • Hypertension Sister    • No Known Problems Daughter    • No Known Problems Daughter    • Diabetes unspecified Maternal Grandmother    • Prostate cancer Maternal Grandfather    • Diabetes Maternal Aunt    • Diabetes Maternal Uncle    • Diabetes Paternal Aunt    • Breast cancer Cousin 39       Family history of uterine or ovarian cancer: no  Family history of breast cancer: yes  Family history of colon cancer: no    Social History:  Social History     Socioeconomic History   • Marital status: /Civil Union     Spouse name: Not on file   • Number of children: Not on file   • Years of education: Not on file   • Highest education level: Not on file   Occupational History   • Not on file   Tobacco Use   • Smoking status: Never   • Smokeless tobacco: Never   Vaping Use   • Vaping Use: Never used   Substance and Sexual Activity   • Alcohol use: Yes     Alcohol/week: 1 0 standard drink     Types: 1 Glasses of wine per week     Comment: Only on holidays   • Drug use: Never   • Sexual activity: Yes     Partners: Male     Birth control/protection: Post-menopausal   Other Topics Concern   • Not on file   Social History Narrative   • Not on file     Social Determinants of Health     Financial Resource Strain: Not on file   Food Insecurity: Not on file   Transportation Needs: Not on file   Physical Activity: Not on file   Stress: Not on file   Social Connections: Not on file   Intimate Partner Violence: Not on file   Housing Stability: Not on file     Domestic violence screen: negative    Allergies:   Allergies   Allergen Reactions   • Sulfa Antibiotics Rash       Medications:    Current Outpatient Medications:   •  albuterol (Ventolin HFA) 90 mcg/act inhaler, Inhale 2 puffs every 6 (six) hours as needed for wheezing, Disp: 18 g, Rfl: 2  •  atorvastatin (LIPITOR) 10 mg tablet, take 1 tablet by mouth once daily, Disp: 90 tablet, Rfl: 1  •  cetirizine (ZyrTEC) 10 mg tablet, as needed  , Disp: , Rfl:   •  famotidine (PEPCID) 40 MG tablet, Take 1 tablet (40 mg total) by mouth daily at bedtime, Disp: 30 tablet, Rfl: 11  •  fluticasone (FLONASE) 50 mcg/act nasal spray, 2 sprays into each nostril as needed for rhinitis, Disp: 16 g, Rfl: 3  •  omeprazole (PriLOSEC) 40 MG capsule, Take 1 capsule (40 mg total) by mouth every morning 30 min before breakfast, Disp: 30 capsule, Rfl: 11  •  solifenacin (VESICARE) 10 MG tablet, take 1 tablet by mouth once daily, Disp: 90 tablet, Rfl: 1  •  Synthroid 200 MCG tablet, Take 1 tablet (200 mcg total) by mouth every morning, Disp: 90 tablet, Rfl: 1  •  valsartan-hydrochlorothiazide (DIOVAN-HCT) 80-12 5 MG per tablet, take 1 tablet by mouth once daily, Disp: 90 tablet, Rfl: 0    Review of Systems:  Review of Systems   Constitutional: Negative for activity change, appetite change and unexpected weight change  Respiratory: Negative for cough and shortness of breath  Cardiovascular: Negative for chest pain  Gastrointestinal: Negative for abdominal pain, constipation, diarrhea, nausea and vomiting  Genitourinary: Negative for difficulty urinating, dyspareunia, frequency, menstrual problem, pelvic pain, urgency, vaginal bleeding, vaginal discharge and vaginal pain  Musculoskeletal: Negative for back pain  Skin: Negative  Neurological: Negative for dizziness, weakness, light-headedness and headaches  Psychiatric/Behavioral: Negative  Physical Exam:  /70 (BP Location: Right arm, Patient Position: Sitting, Cuff Size: Standard)   Ht 5' 6" (1 676 m)   Wt 99 7 kg (219 lb 12 8 oz)   BMI 35 48 kg/m²    Physical Exam  Constitutional:       General: She is not in acute distress  Appearance: Normal appearance  She is well-developed  She is not diaphoretic  Genitourinary:      Vulva and bladder normal       No lesions in the vagina  Genitourinary Comments: Perineum normal in appearance, no lacerations, no ulcerations, no lesions visualized  Right Labia: No rash, tenderness or lesions  Left Labia: No tenderness, lesions or rash       No inguinal adenopathy present in the right or left side      No vaginal discharge, erythema, tenderness or bleeding  No vaginal prolapse present  No vaginal atrophy present  Right Adnexa: not tender, not full and no mass present  Left Adnexa: not tender, not full and no mass present  No cervical motion tenderness, discharge, friability, lesion or polyp  No parametrium nodularity or thickening present  Uterus is not enlarged or tender  No uterine mass detected  No urethral prolapse or mass present  Bladder is not tender  Pelvic exam was performed with patient in the lithotomy position  Rectum:      No tenderness or external hemorrhoid  Breasts:     Breasts are symmetrical       Right: No swelling, bleeding, mass, skin change or tenderness  Left: No swelling, bleeding, mass, skin change or tenderness  HENT:      Head: Normocephalic and atraumatic  Neck:      Thyroid: No thyromegaly or thyroid tenderness  Cardiovascular:      Rate and Rhythm: Normal rate and regular rhythm  Heart sounds: Normal heart sounds  No murmur heard  No friction rub  Pulmonary:      Effort: Pulmonary effort is normal  No respiratory distress  Breath sounds: Normal breath sounds  No wheezing or rales  Abdominal:      Palpations: Abdomen is soft  There is no mass  Tenderness: There is no abdominal tenderness  There is no guarding  Musculoskeletal:         General: No tenderness  Normal range of motion  Right lower leg: No edema  Left lower leg: No edema  Lymphadenopathy:      Lower Body: No right inguinal adenopathy  No left inguinal adenopathy  Neurological:      Mental Status: She is alert and oriented to person, place, and time  Skin:     General: Skin is warm and dry  Coloration: Skin is not pale  Findings: No erythema  Psychiatric:         Mood and Affect: Mood normal          Behavior: Behavior normal          Thought Content:  Thought content normal          Judgment: Judgment normal    Vitals and nursing note reviewed

## 2023-03-08 NOTE — PATIENT INSTRUCTIONS
Guidelines for Vulvar Skin Care    NOTE:     The goal is to promote healthy vulvar skin  This is done by decreasing and/or removing any chemicals, moisture, or rubbing (friction)  Any products listed below have been suggested for use because of their past success in helping to decrease or relieve vulvar/vaginal itching and burning  LAUNDRY PRODUCTS  Use a detergent free of dyes, enzymes and perfumes (such as ALL-Free and Clear or Earth-Rite) on any clothing that comes in contact with your vulva such as your underwear, exercise clothes, towels, or pajama bottoms  Use 1/3 to 1/2 the suggested amount per load  Other clothing may be washed in the laundry soap of your choice  Do not use a fabric softener in the washer or dryer on these articles of clothing  If you do use dryer sheets with the rest of your clothes, for any loads, you must hang dry your underwear, towels, and any other clothing that comes in contact with your vulva  Stain Removing Products  Soak and rinse in clear water all underwear and towels on which you have used a stain removing product  Then wash in your regular washing cycle  This removes as much of the product as possible  CLOTHING  Wear white all cotton underwear, not nylon with a cotton crotch  Cotton allows air in and moisture out  Avoid pantyhose  If you must wear them, either cut out the tyra crotch (if you cut out the crotch be sure to leave about 1/4 to 1/2 inch of fabric from the seam to prevent running) or wear thigh high hose  Many stores now carry thigh high nylons  Avoid tight clothing, especially clothing made of synthetic fabrics  Remove wet bathing and exercise clothing as soon as you can  BATHING AND HYGIENE  Avoid bath soaps, lotions, gels, etc  which contain perfumes  These may smell nice but can be irritating  This includes many baby products and feminine hygiene products marked "gentle" or "mild"   Dove-Hypoallergenic, Neutrogena, Basis, or Pears are the soaps we suggest  Do not use soap directly on the vulvar skin; just warm water and your hand will keep the vulvar area clean without irritating the skin  Avoid all bubble baths, bath salts and scented oils  You may apply a neutral (unscented, non-perfumed) oil such as Yessy Oil to damp skin after getting out of the tub or shower  Do not apply oils directly to the vulva  Do not scrub vulvar skin with a washcloth, washing with your hand and warm water is enough for good cleaning  Pat dry rather than rubbing with a towel  Or use a hairdryer on a cool setting to dry the vulva  Baking Soda soaks  Soak in lukewarm (not hot) bath water with 4-5 tablespoons of baking soda to help soothe vulvar itching and burning  Soak 1 to 3 times a day for 10-15 minutes  Cool Compresses: Apply cool compresses to the vulva for 15-20 minutes at a time, up to three times per day for burning or itching  Do not use for prolonged amounts of time as this can lead to damage to the skin  Use white, unscented toilet paper  If paper has a perfumed scent or lotion, avoid using it  Avoid all feminine hygiene sprays, perfumes, adult, or baby wipes  Pour lukewarm water over the vulva after urinating if urine causes burning of the skin  Pat dry rather than rubbing with a towel  Avoid the use of deodorized pads and tampons  Tampons should be used when the blood flow is heavy enough to soak one tampon in four hours or less  Tampons are safe for most women, but wearing them too long or when the blood flow is light may result in vaginal infection, increased discharge, odor, or toxic shock syndrome  Also, use only pads that have a cotton liner that comes in contact with your skin  You can obtain all cotton pads from [employer]  Avoid all over the counter creams or ointments, except A&D Ointment  Ask your health care provider first   Small amounts of A&D Ointment may be applied to your vulva as often as needed to protect the skin   It may also help to decrease skin irritation during your period and when you urinate  Brands that have been helpful are the [place] brand, [employer]  brand, [place] brand, or [employer] [facility] brand  DO NOT DOUCHE  Baking soda soaks will help rinse away extra discharge and help with odor  DO NOT SHAVE the vulvar area  Some women may have problems with chronic dampness  Keeping dry is important  Choose cotton fabrics whenever you can  Keep an extra pair of underwear with you in a small bag and change if you become damp during the day at work/school  Gold Bond Powder or Zeosorb Powder may be applied to the vulva and groin area one to two times per day to help absorb moisture  Dryness and irritation of the vagina and vulva may be helped by using an emoillent  Use a small amount of a pure vegetable oil such as Crisco or olive oil  The vegetable oils contain no chemicals to irritate vulvar/vaginal skin  Vegetable oils will rinse away with water and will not increase your chances of infection  You can also use Vitamin E oil or coconut oil  You can apply the emoillent as often as needed for dryness and irritation; I recommend at least once daily, but you can apply more frequently  You can also use the emollient during intercourse  Water-based products like K-Y Jelly are helpful, but may tend to dry before intercourse is over and also contain chemicals that can irritate your vulvar skin  It may be helpful to use a non-lubricated, non-spermicidal condom, and use vegetable oil as the lubricant  This will help keep the semen off the skin which can decrease burning and irritation after intercourse

## 2023-03-09 ENCOUNTER — OFFICE VISIT (OUTPATIENT)
Dept: OBGYN CLINIC | Facility: CLINIC | Age: 63
End: 2023-03-09

## 2023-03-09 VITALS
HEIGHT: 66 IN | SYSTOLIC BLOOD PRESSURE: 153 MMHG | HEART RATE: 65 BPM | WEIGHT: 219 LBS | DIASTOLIC BLOOD PRESSURE: 88 MMHG | BODY MASS INDEX: 35.2 KG/M2

## 2023-03-09 DIAGNOSIS — M25.531 RIGHT WRIST PAIN: Primary | ICD-10-CM

## 2023-03-09 DIAGNOSIS — S66.911A SPRAIN AND STRAIN OF RIGHT WRIST: ICD-10-CM

## 2023-03-09 DIAGNOSIS — M19.041 ARTHRITIS OF BOTH HANDS: ICD-10-CM

## 2023-03-09 DIAGNOSIS — S63.501A SPRAIN AND STRAIN OF RIGHT WRIST: ICD-10-CM

## 2023-03-09 DIAGNOSIS — M18.11 PRIMARY OSTEOARTHRITIS OF FIRST CARPOMETACARPAL JOINT OF RIGHT HAND: ICD-10-CM

## 2023-03-09 DIAGNOSIS — M19.042 ARTHRITIS OF BOTH HANDS: ICD-10-CM

## 2023-03-09 LAB
HPV HR 12 DNA CVX QL NAA+PROBE: NEGATIVE
HPV16 DNA CVX QL NAA+PROBE: NEGATIVE
HPV18 DNA CVX QL NAA+PROBE: NEGATIVE

## 2023-03-09 NOTE — PROGRESS NOTES
Orthopaedic Surgery - Office Note  Antonio Joseph (41 y o  female)   : 1960   MRN: [id number]  Encounter Date: 3/9/2023    Chief Complaint   Patient presents with   • Right Wrist - Pain         Assessment/Plan  Diagnoses and all orders for this visit:    Right wrist pain  -     Durable Medical Equipment    Primary osteoarthritis of first carpometacarpal joint of right hand  -     Durable Medical Equipment    Arthritis of both hands    The diagnosis as well as treatment options were reviewed with the patient in the office today  She was advised she has advanced degenerative changes of her right [facility] joint which were acutely flared up secondary to the dog leash injury  Treatment options would consist of early mobilization with a thumb spica brace that may be removed for comfort and hygiene purposes-she would like to try the brace today she will ice the wrist 20 minutes on 1 hour off 3 times a day  She may use an oral anti-inflammatory with food such as Aleve as tolerated stopping and calling if any stomach upset occurs  She does not wish to consider occupational therapy today as she is doing well  She does not wish to consider a cortisone injection today as she is doing relatively well  Return if symptoms worsen or fail to improve  History of Present Illness  This is a new patient who injured her right wrist on 3/5/2023 whenever her dog pulled her while they were on a walk  The pain increased the following day and she went to the urgent care on 3/6/2023 and had x-rays taken which were negative for fracture but did show advanced degenerative changes of the [facility] joint  She was diagnosed with a wrist sprain at the urgent care  She presents today for evaluation and treatment  She denies problems with the right wrist in the past   She is right-hand dominant  No paresthesias are reported  She denies any contributing medical history    She reports that the pain is significantly decreased since the initial injury  Currently she has a 0 out of 10 pain score  She reports she knows she has arthritis in her hands which has been longstanding  She does not wish to consider a cortisone injection today or occupational therapy  She denies any personal history rheumatoid arthritis and denies wishing to consider seeing a rheumatologist     Review of Systems  Pertinent items are noted in HPI  All other systems were reviewed and are negative  Physical Exam  /88 (BP Location: Left arm, Patient Position: Sitting, Cuff Size: Standard)   Pulse 65   Ht 5' 6" (1 676 m)   Wt 99 3 kg (219 lb)   BMI 35 35 kg/m²   Cons: Appears well  No apparent distress  Psych: Alert  Oriented x3  Mood and affect normal   Eyes: PERRLA, EOMI  Resp: Normal effort  No audible wheezing or stridor  CV: Palpable pulse  No discernable arrhythmia  Lymph:  No palpable cervical, axillary, or inguinal lymphadenopathy  Skin: Warm  No palpable masses  No visible lesions  Neuro: Normal muscle tone  Normal and symmetric DTR's  Patient's right hand has noted degenerative deformity and changes at her MCP and IP joints  She also has tenderness mildly at the [facility] joint  She has no anatomical snuffbox tenderness  She has a negative Finkelstein's test   She has limited range of motion to composite fist, but some rotational deformity noted  She has mild thenar atrophy  She is neurovascular grossly intact in the right hand          Studies Reviewed  Study Result    Narrative & Impression   RIGHT WRIST     INDICATION:   S63 501A: Unspecified sprain of right wrist, initial encounter  S66 911A: Strain of unspecified muscle, fascia and tendon at wrist and hand level, right hand, initial encounter      COMPARISON:  None     VIEWS:  XR WRIST 3+ VW RIGHT         FINDINGS:     There is no acute fracture or dislocation      Severe osteoarthritis of the 1st CMC joint    Mild to moderate osteoarthritis of the triscaphe joint      No lytic or blastic osseous lesion      Soft tissues are unremarkable      IMPRESSION:     Severe osteoarthritis of the right 1st CMC joint      No acute bony abnormality in the right wrist            Workstation performed: [id number]     X-ray images as well as reports were reviewed by myself in the office today and I agree with radiologist interpretation  Urgent care notes from 3/6/2023 were reviewed by myself in the office today  Procedures  No procedures today  Medical, Surgical, Family, and Social History  The patient's medical history, family history, and social history, were reviewed and updated as appropriate      Past Medical History:   Diagnosis Date   • Arthritis    • Asthma    • BRCA1 negative    • BRCA2 negative    • Cancer (HCC)     thyroid cancer   • Colon polyp    • Disease of thyroid gland    • Fibroid    • History of COVID-19 01/04/2022    mild cold s/s   • Hyperlipidemia    • Hypertension    • Hypothyroidism    • PONV (postoperative nausea and vomiting)    • Postmenopausal bleeding 11/23/2021   • Seasonal allergies    • Varicella        Past Surgical History:   Procedure Laterality Date   • ADENOIDECTOMY     • BREAST BIOPSY     • BREAST CYST EXCISION Right 1991    Benign   • BREAST LUMPECTOMY     • [place] HYSTEROSCOPY BX ENDOMETRIUM&/POLYPC W/WO D&C N/A 03/07/2022    Procedure: DILATATION AND CURETTAGE (D&C) WITH HYSTEROSCOPY, polypectomy;  Surgeon: Muriel Ram MD;  [facility];  Service: Gynecology   • THYROIDECTOMY     • TONSILLECTOMY     • VARICOSE VEIN SURGERY Bilateral        Family History   Problem Relation Age of Onset   • Breast cancer Mother    • Heart disease Mother    • Heart disease Father    • Stroke Father    • Kidney disease Father    • Breast cancer Sister    • Hypertension Sister    • No Known Problems Daughter    • No Known Problems Daughter    • Diabetes unspecified Maternal Grandmother    • Prostate cancer Maternal Grandfather    • Diabetes Maternal Aunt    • Diabetes Maternal Uncle    • Diabetes Paternal Aunt    • Breast cancer Cousin 39       Social History     Occupational History   • Not on file   Tobacco Use   • Smoking status: Never   • Smokeless tobacco: Never   Vaping Use   • Vaping Use: Never used   Substance and Sexual Activity   • Alcohol use:  Yes     Alcohol/week: 1 0 standard drink     Types: 1 Glasses of wine per week     Comment: Only on holidays   • Drug use: Never   • Sexual activity: Yes     Partners: Male     Birth control/protection: Post-menopausal       Allergies   Allergen Reactions   • Sulfa Antibiotics Rash         Current Outpatient Medications:   •  albuterol (Ventolin HFA) 90 mcg/act inhaler, Inhale 2 puffs every 6 (six) hours as needed for wheezing, Disp: 18 g, Rfl: 2  •  atorvastatin (LIPITOR) 10 mg tablet, take 1 tablet by mouth once daily, Disp: 90 tablet, Rfl: 1  •  cetirizine (ZyrTEC) 10 mg tablet, as needed  , Disp: , Rfl:   •  famotidine (PEPCID) 40 MG tablet, Take 1 tablet (40 mg total) by mouth daily at bedtime, Disp: 30 tablet, Rfl: 11  •  fluticasone (FLONASE) 50 mcg/act nasal spray, 2 sprays into each nostril as needed for rhinitis, Disp: 16 g, Rfl: 3  •  omeprazole (PriLOSEC) 40 MG capsule, Take 1 capsule (40 mg total) by mouth every morning 30 min before breakfast, Disp: 30 capsule, Rfl: 11  •  solifenacin (VESICARE) 10 MG tablet, take 1 tablet by mouth once daily, Disp: 90 tablet, Rfl: 1  •  Synthroid 200 MCG tablet, Take 1 tablet (200 mcg total) by mouth every morning, Disp: 90 tablet, Rfl: 1  •  valsartan-hydrochlorothiazide (DIOVAN-HCT) 80-12 5 MG per tablet, take 1 tablet by mouth once daily, Disp: 90 tablet, Rfl: 0      Jordy Villalobos PA-C

## 2023-03-16 LAB
LAB AP GYN PRIMARY INTERPRETATION: NORMAL
Lab: NORMAL

## 2023-04-05 ENCOUNTER — ESTABLISHED COMPREHENSIVE EXAM (OUTPATIENT)
Dept: URBAN - METROPOLITAN AREA CLINIC 6 | Facility: CLINIC | Age: 63
End: 2023-04-05

## 2023-04-05 DIAGNOSIS — H25.13: ICD-10-CM

## 2023-04-05 DIAGNOSIS — H04.123: ICD-10-CM

## 2023-04-05 PROCEDURE — 92014 COMPRE OPH EXAM EST PT 1/>: CPT

## 2023-04-05 ASSESSMENT — TONOMETRY
OS_IOP_MMHG: 18
OD_IOP_MMHG: 15

## 2023-04-05 ASSESSMENT — VISUAL ACUITY
OD_CC: 20/25
OS_CC: 20/25-2

## 2023-04-07 DIAGNOSIS — I10 ESSENTIAL HYPERTENSION: ICD-10-CM

## 2023-04-07 LAB
ALBUMIN SERPL-MCNC: 4.3 G/DL (ref 3.8–4.8)
ALBUMIN/GLOB SERPL: 1.6 {RATIO} (ref 1.2–2.2)
ALP SERPL-CCNC: 132 IU/L (ref 44–121)
ALT SERPL-CCNC: 16 IU/L (ref 0–32)
AST SERPL-CCNC: 21 IU/L (ref 0–40)
BASOPHILS # BLD AUTO: 0.1 X10E3/UL (ref 0–0.2)
BASOPHILS NFR BLD AUTO: 1 %
BILIRUB SERPL-MCNC: 0.4 MG/DL (ref 0–1.2)
BUN SERPL-MCNC: 17 MG/DL (ref 8–27)
BUN/CREAT SERPL: 18 (ref 12–28)
CALCIUM SERPL-MCNC: 9.5 MG/DL (ref 8.7–10.3)
CHLORIDE SERPL-SCNC: 106 MMOL/L (ref 96–106)
CHOLEST SERPL-MCNC: 150 MG/DL (ref 100–199)
CO2 SERPL-SCNC: 23 MMOL/L (ref 20–29)
CREAT SERPL-MCNC: 0.92 MG/DL (ref 0.57–1)
EGFR: 70 ML/MIN/1.73
EOSINOPHIL # BLD AUTO: 0.1 X10E3/UL (ref 0–0.4)
EOSINOPHIL NFR BLD AUTO: 2 %
ERYTHROCYTE [DISTWIDTH] IN BLOOD BY AUTOMATED COUNT: 12.7 % (ref 11.7–15.4)
GLOBULIN SER-MCNC: 2.7 G/DL (ref 1.5–4.5)
GLUCOSE SERPL-MCNC: 91 MG/DL (ref 70–99)
HBA1C MFR BLD: 5.7 % (ref 4.8–5.6)
HCT VFR BLD AUTO: 40.5 % (ref 34–46.6)
HCV AB S/CO SERPL IA: NON REACTIVE
HDLC SERPL-MCNC: 49 MG/DL
HGB BLD-MCNC: 13.6 G/DL (ref 11.1–15.9)
IMM GRANULOCYTES # BLD: 0 X10E3/UL (ref 0–0.1)
IMM GRANULOCYTES NFR BLD: 0 %
LDLC SERPL CALC-MCNC: 80 MG/DL (ref 0–99)
LYMPHOCYTES # BLD AUTO: 1.9 X10E3/UL (ref 0.7–3.1)
LYMPHOCYTES NFR BLD AUTO: 32 %
MCH RBC QN AUTO: 29 PG (ref 26.6–33)
MCHC RBC AUTO-ENTMCNC: 33.6 G/DL (ref 31.5–35.7)
MCV RBC AUTO: 86 FL (ref 79–97)
MONOCYTES # BLD AUTO: 0.4 X10E3/UL (ref 0.1–0.9)
MONOCYTES NFR BLD AUTO: 6 %
NEUTROPHILS # BLD AUTO: 3.4 X10E3/UL (ref 1.4–7)
NEUTROPHILS NFR BLD AUTO: 59 %
PLATELET # BLD AUTO: 205 X10E3/UL (ref 150–450)
POTASSIUM SERPL-SCNC: 4.1 MMOL/L (ref 3.5–5.2)
PROT SERPL-MCNC: 7 G/DL (ref 6–8.5)
RBC # BLD AUTO: 4.69 X10E6/UL (ref 3.77–5.28)
SODIUM SERPL-SCNC: 144 MMOL/L (ref 134–144)
T4 FREE SERPL-MCNC: 1.54 NG/DL (ref 0.82–1.77)
THYROGLOB AB SERPL-ACNC: 8.4 IU/ML (ref 0–0.9)
TRIGL SERPL-MCNC: 118 MG/DL (ref 0–149)
TSH SERPL DL<=0.005 MIU/L-ACNC: 0.42 UIU/ML (ref 0.45–4.5)
TSH SERPL DL<=0.005 MIU/L-ACNC: 0.45 UIU/ML (ref 0.45–4.5)
WBC # BLD AUTO: 5.8 X10E3/UL (ref 3.4–10.8)

## 2023-04-07 RX ORDER — VALSARTAN AND HYDROCHLOROTHIAZIDE 80; 12.5 MG/1; MG/1
TABLET, FILM COATED ORAL
Qty: 90 TABLET | Refills: 0 | Status: SHIPPED | OUTPATIENT
Start: 2023-04-07

## 2023-05-11 NOTE — TELEPHONE ENCOUNTER
Aware of consult  Plan for left hip surgery tomorrow  Please keep NPO after midnight   Patient called, she is scheduled for surgery with you on 1/18 for a D&C  She states she tested positive for COVID on 1/4  She is done with quarantine  She states it was like a bad cold and that she is feeling better  She is unvaccinated  She wants to know if she can still have her surgery?

## 2023-07-03 ENCOUNTER — EVALUATION (OUTPATIENT)
Dept: SPEECH THERAPY | Facility: CLINIC | Age: 63
End: 2023-07-03
Payer: COMMERCIAL

## 2023-07-03 DIAGNOSIS — R49.0 DYSPHONIA: Primary | ICD-10-CM

## 2023-07-03 DIAGNOSIS — K21.9 GASTROESOPHAGEAL REFLUX DISEASE, UNSPECIFIED WHETHER ESOPHAGITIS PRESENT: ICD-10-CM

## 2023-07-03 DIAGNOSIS — J38.01 PARESIS OF RIGHT VOCAL FOLD: ICD-10-CM

## 2023-07-03 DIAGNOSIS — J38.3 GLOTTIC INSUFFICIENCY: ICD-10-CM

## 2023-07-03 DIAGNOSIS — R49.0 MUSCLE TENSION DYSPHONIA: ICD-10-CM

## 2023-07-03 PROCEDURE — 92524 BEHAVRAL QUALIT ANALYS VOICE: CPT

## 2023-07-03 PROCEDURE — 92507 TX SP LANG VOICE COMM INDIV: CPT

## 2023-07-03 NOTE — PROGRESS NOTES
Speech-Language Pathology Initial Evaluation    Today's date: 7/3/2023   Patient’s name: Noelle Burton  : 1960  MRN: [id number]  Safety measures: none  Referring provider: Jaden Adams MD    Encounter Diagnosis     ICD-10-CM    1. Dysphonia  R49.0       2. Gastroesophageal reflux disease, unspecified whether esophagitis present  K21.9       3. Paresis of right vocal fold  J38.01       4. Glottic insufficiency  J38.3       5. 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 Physician    Patient's goal(s): to improve vocal quality, to not have trouble swallowing bread    Reason for referral: Change in vocal quality  Prior functional status: Communication effective and appropriate in all situations  Clinically complex situations: N/A    History: Patient is a 58 y.o. female who was referred to outpatient skilled Speech Therapy services for a voice evaluation. Patient referred by ENT secondary to dysphonia secondary to MTD and R VF paresis. Patient f/u with ENT . Patient presents with vocal hoarseness; contributing factors are reflux, MTD and R VF paresis (most likely from thyroid surgery in ).     Hearing: [facility] for testing  Vision: [facility] for testing    Home environment/lifestyle: home with spouse  Highest level of education: n/a  Vocational status: retired ()    Mental status: Alert  Behavior status: Cooperative  Communication modalities: Verbal  Rehabilitation prognosis: Good rehab potential to reach the established goals    Assessments    VOICE EVALUATION:    Interview:   Chief complaint: throat clearing, vocal hoarseness and vocal fatigue  -Onset: unknown  -Symptom progression since onset has been: worsening  -Associated symptoms: none  -Voice is better: no known pattern  -Voice is worse: after lots of talking    -Occupation: retired  -Vocal demand: moderate (voice use includes: speaking to family, kids, etc)  -Amplification: Never  -Past training or therapy: yes, after thyroid CA sx in 2007; reports it was not successful    -Singing: no    -Voice surgery: no - thryoid cancer surgery    Vocal hygiene:  -Smoking: no   -Water intake daily in oz: 2-3 glasses/day  -Alcohol intake servings weekly: occasional  -Caffeine intake daily in oz: 1 cup coffee / day , teas  -Caffeine-free beverages daily in oz: water, tea  -Throat clearing: frequent  -Lozenges: non-mentholated - ricolla   -Exposure to chemicals, dry, or nakia environments: none    -Cough: no    -Dyspnea: yes  Symptoms: Pt reported SOB with exercise    -Dysphagia: yes  Swallowing symptoms: Pt reported difficulties with bread (needing to wash with water)    -Allergy testing: yes - allergy induced asthma  -Allergies: ragweed; dust; mildew   -Nasal symptoms: post-nasal drip - clariton, nasal spray  -GERD/LPR symptoms: heartburn sometimes at night    Patient Self-Ratings:  -The Voice Handicap Index (VHI) is a self-administered, 30-item outcomes instrument to quantify patients' perception of their voice handicap. It is a list of statements that many people have used to describe their voices and the effects of their voices on their lives. Patient indicated how frequently she has the same experience using a rating point scale (“never” = 0, “almost never” = 1, “sometimes” = 2, “almost always” = 3, and “always” = 4).  Patient obtained a score of 51/120, indicating a self-perceived impairment level of MODERATE. (see scanned document)    Subscale: Score: Self-Perceived Impairment Level:   Physical 23/40 Severe   Functional 15/40 Moderate   Emotional 13/40 Moderate        TOTAL 51/120 Moderate         Objective Measurements/Voice Parameters:  RESPIRATORY EFFICIENCY:   -S:Z Ratio Task: Patient was instructed to sustain the sounds /s/ and /z/ to examine the coordination and efficiency of respiration and voice production. Normative data suggests that adults can prolong these sounds for 20-25 seconds. Ratios of 1.4 and above are consistent with laryngeal inefficiency, and ratios of 2.0 and above are suggestive of vocal fold pathology. Patient's S:Z ratio was 0.5 (4 sec : 8 sec). Patient noted to have frontal /s/ lisp; limiting her ability/comfort with sustaining sound. Patient did inquire about therapy for her lisp; which we discussed as a difficult task at this stage in life; but we discussed proper placement techniques if she would like to practice on her own. At this time we will focus on voice therapy. Patient agreeable. -Maximum Phonation Time: Patient was instructed to sustain /a/ to measure respiratory and laryngeal coordination and efficiency. Adults are typically able to prolong vowels sound for 15-20 seconds. Reduced MPT may suggest poor respiratory support, or poor medial glottal closure. Patient's MPT was 15.87 seconds. MEASURES OF PITCH:  The [employer] IV, a computer-driven voice analysis program, was used to collect objective voice data using the Visi-Pitch Multidimensional Voice Program (MDVP) & Real Time Pitch Program (MTPP). -Multi-Dimensional Voice Profile (MDVP): This is obtained on the phonation of the sound /a/, in which dimensions of the voice, including frequncy perturbations, amplitude perturbations, variations in F0, noise to harmonic ratio, voice turbulence Index, soft phonation Index, tremours in frequency and amplitude, degree of subharmonics, and degree of voicing apart from the frequency and amplitude, are reflected.       Normative Data:   Mean Fundamental Frequency (F0) 232.16 Hz 243.9 Hz   Jitter (RAP) 0.33% 0.378%   Shimmer 4.7% 1.997%   Oeajh-jl-Uobdoftnb Ratio (NHR) 0.126 0.112       -Habitual Pitch: Patient was instructed to engage in two different speaking tasks (counting and reading) to calculate the pitch she uses most frequently. Task: Mean F0 (Hz) Min-Max Range (Hz) Normative Data:   Speaking (counting 1-10) 181 Hz  Hz 225 Hz (180 Hz -250 Hz)   Reading ("[place]”) 182 Hz  Hz 225 Hz (180 Hz -250 Hz)       -Maximal Phonational Frequency Range: Patient was instructed to voice from lowest to highest notes. Task Min-Max Range (Hz) Normative Data:   Gliding  Hz 134 Hz-895 Hz           Goals    Short Term Goals:     Patient will be educated on vocal hygiene and demonstrate understanding of recommendations to facilitate increased quality of voice, to be achieved in 2-4 weeks. Patient will be educated on diaphragmatic breathing (versus clavicular breathing) to establish controlled, rhythmic breathing, to be achieved in 2-4 weeks. Patient will utilize diaphragmatic breathing during oral sentence/paragraph reading in 80% of opportunities to facilitate increased breath support for voice/speaking, decrease laryngeal effort, and improve glottic closure to be achieved in 4-6 weeks. Patient will utilize semi-occluded vocal tract exercises without laryngeal tension to promote healthy vocal function with 80% accuracy, to be achieved in 4-6 weeks. Patient will utilize a forward tone focused/resonant voice to decrease vocal hyperfunction and improve voice quality and vocal projection, to be achieved in 4-6 weeks. Patient will self-report a decrease in muscle tension of the larynx and cervical area after independently completing relaxation/stretching exercise to be achieved in 6-8 weeks. Long Term Goals:     Patient will improve vocal quality for increased functional communication by discharge. Patient will independently utilize vocal function exercises to maintain best level of voice to facilitate increased generational skills into conversational speech by discharge.          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 Patient with hx of thyroid cancer and surgery back in 2007. She reports she had problems then with her voice; but its been getting worse more recently. She follows with ENT Mai Russ) who reports;     Voice - MTD/technique contributing the most, voice improved with better breath/support and during rigid SVL; also dry mucous/vf, mild right vf paresis. Reflux symptoms and response to acid suppression, mild on laryngoscopy. Voice therapy (SLP) for vocal hygiene, voice still needed; she plans to start ~ July since having surgery in June  -voice strong/resonant with tongue out (MTD)    Today, patient was educated on various vocal abuse behaviors and vocal hygiene throughout assessment. Vocal abuses included decreased water and increased caffeine intake, coughing and throat-clearing, thoracic/clavicular breathing, straining voice, whispering. Vocal hygiene strategies included increased water intake, decreased caffeine intake, throat-clearing awareness, increased breath support/diaphragmatic breathing, compensatory strategies to minimize vocal abuses during work day, confidential voice. Patient was agreeable. Vocal hoarseness booklet and SOVT (aTempo) handouts provided today for patient to begin exercises. Due to patient's current symptoms he/she may benefit from Myofascial Release treatment (MFR) for voice and swallowing. This is a manual therapy technique through myofascial release (stimulation/pressure/stretch) to address patient's symptoms, and will be completed unless otherwise contraindicated.        Recommendations:  -Patient would benefit from outpatient skilled Speech Therapy services: Voice therapy    -Frequency: 1x weekly  -Duration: 4-6 weeks    -Intervention certification from: 2/9/3706  -Intervention certification to: 9/0/9577    -Intervention comments:   Voice eval with patient x 25 min; education on throat clearing and SOVT exercise intro x 15 min

## 2023-07-04 DIAGNOSIS — E78.00 HYPERCHOLESTEREMIA: ICD-10-CM

## 2023-07-05 RX ORDER — ATORVASTATIN CALCIUM 10 MG/1
TABLET, FILM COATED ORAL
Qty: 90 TABLET | Refills: 1 | Status: SHIPPED | OUTPATIENT
Start: 2023-07-05

## 2023-07-06 DIAGNOSIS — I10 ESSENTIAL HYPERTENSION: ICD-10-CM

## 2023-07-06 RX ORDER — VALSARTAN AND HYDROCHLOROTHIAZIDE 80; 12.5 MG/1; MG/1
TABLET, FILM COATED ORAL
Qty: 90 TABLET | Refills: 0 | Status: SHIPPED | OUTPATIENT
Start: 2023-07-06

## 2023-07-10 ENCOUNTER — OFFICE VISIT (OUTPATIENT)
Dept: SPEECH THERAPY | Facility: CLINIC | Age: 63
End: 2023-07-10
Payer: COMMERCIAL

## 2023-07-10 DIAGNOSIS — K21.9 GASTROESOPHAGEAL REFLUX DISEASE, UNSPECIFIED WHETHER ESOPHAGITIS PRESENT: ICD-10-CM

## 2023-07-10 DIAGNOSIS — J04.0 REFLUX LARYNGITIS: ICD-10-CM

## 2023-07-10 DIAGNOSIS — E89.0 HX OF TOTAL THYROIDECTOMY: ICD-10-CM

## 2023-07-10 DIAGNOSIS — J38.01 PARESIS OF RIGHT VOCAL FOLD: ICD-10-CM

## 2023-07-10 DIAGNOSIS — R13.14 PHARYNGOESOPHAGEAL DYSPHAGIA: ICD-10-CM

## 2023-07-10 DIAGNOSIS — J38.3 GLOTTIC INSUFFICIENCY: ICD-10-CM

## 2023-07-10 DIAGNOSIS — K21.9 REFLUX LARYNGITIS: ICD-10-CM

## 2023-07-10 DIAGNOSIS — R49.0 MUSCLE TENSION DYSPHONIA: ICD-10-CM

## 2023-07-10 DIAGNOSIS — R49.0 DYSPHONIA: Primary | ICD-10-CM

## 2023-07-10 PROCEDURE — 92507 TX SP LANG VOICE COMM INDIV: CPT | Performed by: SPEECH-LANGUAGE PATHOLOGIST

## 2023-07-10 NOTE — PROGRESS NOTES
Daily Speech Treatment Note    Today's date: 7/10/2023   Patient’s name: Tony Bradford  : 1960  MRN: [id number]  Safety measures: none  Referring provider: Bailee Newsome MD    Encounter Diagnosis     ICD-10-CM    1. Dysphonia  R49.0 Ambulatory referral to Speech Therapy      2. Gastroesophageal reflux disease, unspecified whether esophagitis present  K21.9       3. Paresis of right vocal fold  J38.01 Ambulatory referral to Speech Therapy      4. Glottic insufficiency  J38.3 Ambulatory referral to Speech Therapy      5. 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 Pharyngoesophageal dysphagia  R13.14 Ambulatory referral to Speech Therapy      7. Hx of total thyroidectomy  E89.0 Ambulatory referral to Speech Therapy      8. Reflux laryngitis  J04.0 Ambulatory referral to Speech Therapy    K21.9         Visit tracking:  -Referring provider: Epic  -Billing guidelines: AMA  -Visit #2 [place]: Rosanne VINCENT ($25 co pay)  -RE due 9/3/2023    Subjective/Behavioral:  -Patient reported that she has been busy and only able to practice her HEP 3x since last appointment. Objective/Assessment:  -Reviewed patient's home exercises/activities completed since last appointment. See above.     Short-term goals:  Patient will be educated on vocal hygiene and demonstrate understanding of recommendations to facilitate increased quality of voice, to be achieved in 2-4 weeks.  -Patient stated that she has been more mindful with throat clearing and implementing hard swallows and "silent" coughs PRN.      Patient will be educated on diaphragmatic breathing (versus clavicular breathing) to establish controlled, rhythmic breathing, to be achieved in 2-4 weeks.     Patient will utilize diaphragmatic breathing during oral sentence/paragraph reading in 80% of opportunities to facilitate increased breath support for voice/speaking, decrease laryngeal effort, and improve glottic closure to be achieved in 4-6 weeks.     Patient will utilize semi-occluded vocal tract exercises without laryngeal tension to promote healthy vocal function with 80% accuracy, to be achieved in 4-6 weeks.   -Educational video was shown to explain the purpose behind SOVTE.       -Semi-Occluded Vocal Tract Exercises  To target relaxed laryngeal posture and coordination between phonation and respiration, the following activities were completed using principles of SOVTE.      Cup Bubblin. Steady blowing - no voice. Completed 3 reps, avg 11.46 secs.  Patient was able to demonstrate steady bubbling/airflow. 2. Steady blowing - voice ON. Completed 3 reps, avg 11.16 secs. Patient was able to demonstrate steady bubbling/airflow. Patient cued to use front focus, feeling buzzing on lips. 3. Steady blowing - voice ON, low to high. Completed successfully over 3 reps. 4. Steady blowing - voice ON, high to low. Completed successfully over 3 reps. Patient will utilize a forward tone focused/resonant voice to decrease vocal hyperfunction and improve voice quality and vocal projection, to be achieved in 4-6 weeks. -Oral reading with straw phonation: Patient was instructed to practice reading words, sentences, and a short paragraph with straw phonation. Patient implemented straw phonation prior to the reading of each word and sentence. Patient benefited from mod verbal cues to achieve 85% acc. When reading the short paragraph, patient was instructed to "recalibrate" with straw phonation as needed. Vocal raspiness/strain noted with paragraph reading.      Patient will self-report a decrease in muscle tension of the larynx and cervical area after independently completing relaxation/stretching exercise to be achieved in 6-8 weeks. Plan:  -Patient was provided with home exercises/activities to target goals in plan of care at the end of today's session.  -Continue with current plan of care.

## 2023-07-11 ENCOUNTER — OFFICE VISIT (OUTPATIENT)
Dept: OBGYN CLINIC | Facility: CLINIC | Age: 63
End: 2023-07-11
Payer: COMMERCIAL

## 2023-07-11 VITALS — SYSTOLIC BLOOD PRESSURE: 148 MMHG | HEART RATE: 73 BPM | DIASTOLIC BLOOD PRESSURE: 80 MMHG

## 2023-07-11 DIAGNOSIS — M18.11 PRIMARY OSTEOARTHRITIS OF FIRST CARPOMETACARPAL JOINT OF RIGHT HAND: Primary | ICD-10-CM

## 2023-07-11 DIAGNOSIS — M12.9 ARTHROPATHY, MULTIPLE SITES: ICD-10-CM

## 2023-07-11 PROCEDURE — 20600 DRAIN/INJ JOINT/BURSA W/O US: CPT | Performed by: PHYSICAL MEDICINE & REHABILITATION

## 2023-07-11 PROCEDURE — 99214 OFFICE O/P EST MOD 30 MIN: CPT | Performed by: PHYSICAL MEDICINE & REHABILITATION

## 2023-07-11 RX ADMIN — BETAMETHASONE SODIUM PHOSPHATE AND BETAMETHASONE ACETATE 3 MG: 3; 3 INJECTION, SUSPENSION INTRA-ARTICULAR; INTRALESIONAL; INTRAMUSCULAR; SOFT TISSUE at 11:30

## 2023-07-11 RX ADMIN — ROPIVACAINE HYDROCHLORIDE 10 ML: 5 INJECTION, SOLUTION EPIDURAL; INFILTRATION; PERINEURAL at 11:30

## 2023-07-11 NOTE — PROGRESS NOTES
1. Primary osteoarthritis of first carpometacarpal joint of right hand        2. Arthropathy, multiple sites          Orders Placed This Encounter   Procedures   • Small joint arthrocentesis        Impression:  Bilatera hand pain likely secondary to [facility] osteoarthritis. We discussed different treatment options and decided to proceed with right first [facility] steroid injection. We also provided her with bilateral Comfort Cool gloves. She will try Voltaren gel. I will see her back in 4 weeks to reassess. We will obtain left hand x-rays on follow up visit. Would consider 1st left CMC joint injection if still symptomatic. Patient also presents with bilateral knee pain likely secondary to osteoarthritis. Treatment has included right knee steroid injection. We will obtain left knee x-rays if she continues to be symptomatic on her follow-up visit. Can consider bilateral knee steroid injections as well. Imaging Studies (I personally reviewed images in PACS and report):  Right hand x-rays most recent to this encounter reviewed. These images show severe osteoarthritis of the [facility] joint. Right knee x-rays most recent to this encounter reviewed. These images show tricompartmental osteoarthritis is a small joint effusion. These findings are consistent with [place] grade 2 osteoarthritis. There is also a sesamoid bone consistent with a fabella. No follow-ups on file. Patient is in agreement with the above plan. HPI:  Ashleigh Roman is a 58 y.o. female  who presents for evaluation of   Chief Complaint   Patient presents with   • Right Hand - Pain       Onset/Mechanism: Chronic pain. Location: In the base of the thumb. Radiation: As above. Provocative: Gripping. Severity: Painful. Associated Symptoms: Swelling. Treatment so far: No recent treatment.     Following history reviewed and updated:  Past Medical History:   Diagnosis Date   • Arthritis    • Asthma    • BRCA1 negative    • BRCA2 negative    • Cancer [facility])     thyroid cancer   • Colon polyp    • Disease of thyroid gland    • Fibroid    • History of COVID-19 01/04/2022    mild cold s/s   • Hyperlipidemia    • Hypertension    • Hypothyroidism    • PONV (postoperative nausea and vomiting)    • Postmenopausal bleeding 11/23/2021   • Seasonal allergies    • Varicella      Past Surgical History:   Procedure Laterality Date   • ADENOIDECTOMY     • BREAST BIOPSY     • BREAST CYST EXCISION Right 1991    Benign   • BREAST LUMPECTOMY     • IL HYSTEROSCOPY BX ENDOMETRIUM&/POLYPC W/WO D&C N/A 03/07/2022    Procedure: DILATATION AND CURETTAGE (D&C) WITH HYSTEROSCOPY, polypectomy;  Surgeon: Macey Hicks MD;  Location: [facility] MAIN OR;  Service: Gynecology   • THYROIDECTOMY     • TONSILLECTOMY     • VARICOSE VEIN SURGERY Bilateral      Social History   Social History     Substance and Sexual Activity   Alcohol Use Yes   • Alcohol/week: 1.0 standard drink of alcohol   • Types: 1 Glasses of wine per week    Comment: Only on holidays     Social History     Substance and Sexual Activity   Drug Use Never     Social History     Tobacco Use   Smoking Status Never   • Passive exposure: Never   Smokeless Tobacco Never     Family History   Problem Relation Age of Onset   • Breast cancer Mother    • Heart disease Mother    • Heart disease Father    • Stroke Father    • Kidney disease Father    • Breast cancer Sister    • Hypertension Sister    • No Known Problems Daughter    • No Known Problems Daughter    • Diabetes unspecified Maternal Grandmother    • Prostate cancer Maternal Grandfather    • Diabetes Maternal Aunt    • Diabetes Maternal Uncle    • Diabetes Paternal Aunt    • Breast cancer Cousin 39     Allergies   Allergen Reactions   • Avocado (Diagnostic) - Food Allergy Swelling     Tongue swells    • Citrullus Vulgaris Swelling     Tongue swells    • Pineapple - Food Allergy Swelling     Tongue swells   • Sulfa Antibiotics Rash        Constitutional:  /80   Pulse 73 General: NAD. Eyes: Anicteric sclerae. Neck: Supple. Lungs: Unlabored breathing. Cardiovascular: No lower extremity edema. Skin: Intact without erythema. Neurologic: Sensation intact to light touch. Psychiatric: Mood and affect are appropriate. Right Hand Exam     Tenderness   Right hand tenderness location: 1st CMC joint. Other   Erythema: absent  Scars: absent  Sensation: normal  Pulse: present      Left Hand Exam     Tenderness   Left hand tenderness location: 1st CMC joint. Other   Erythema: absent  Scars: absent  Sensation: normal  Pulse: present             Small joint arthrocentesis: R thumb CMC  [place] Protocol:  Consent: Verbal consent obtained. Written consent not obtained. Risks and benefits: risks, benefits and alternatives were discussed  Consent given by: patient  Timeout called at: 7/11/2023 11:54 AM.  Patient understanding: patient states understanding of the procedure being performed  Site marked: the operative site was marked  Radiology Images displayed and confirmed.  If images not available, report reviewed: imaging studies available  Patient identity confirmed: verbally with patient    Supporting Documentation  Indications: pain   Procedure Details  Location: thumb - R thumb CMC  Needle size: 25 G  Ultrasound guidance: no  Approach: dorsal  Medications administered: 3 mg betamethasone acetate-betamethasone sodium phosphate 6 (3-3) mg/mL; 10 mL ropivacaine 0.5 %    Patient tolerance: patient tolerated the procedure well with no immediate complications

## 2023-07-12 DIAGNOSIS — E89.0 POSTOPERATIVE HYPOTHYROIDISM: ICD-10-CM

## 2023-07-12 RX ORDER — LEVOTHYROXINE SODIUM 200 MCG
TABLET ORAL
Qty: 90 TABLET | Refills: 1 | Status: SHIPPED | OUTPATIENT
Start: 2023-07-12

## 2023-07-13 RX ORDER — BETAMETHASONE SODIUM PHOSPHATE AND BETAMETHASONE ACETATE 3; 3 MG/ML; MG/ML
3 INJECTION, SUSPENSION INTRA-ARTICULAR; INTRALESIONAL; INTRAMUSCULAR; SOFT TISSUE
Status: COMPLETED | OUTPATIENT
Start: 2023-07-11 | End: 2023-07-11

## 2023-07-13 RX ORDER — ROPIVACAINE HYDROCHLORIDE 5 MG/ML
10 INJECTION, SOLUTION EPIDURAL; INFILTRATION; PERINEURAL
Status: COMPLETED | OUTPATIENT
Start: 2023-07-11 | End: 2023-07-11

## 2023-07-20 ENCOUNTER — OFFICE VISIT (OUTPATIENT)
Dept: SPEECH THERAPY | Facility: CLINIC | Age: 63
End: 2023-07-20
Payer: COMMERCIAL

## 2023-07-20 DIAGNOSIS — R13.14 PHARYNGOESOPHAGEAL DYSPHAGIA: ICD-10-CM

## 2023-07-20 DIAGNOSIS — J04.0 REFLUX LARYNGITIS: ICD-10-CM

## 2023-07-20 DIAGNOSIS — J38.3 GLOTTIC INSUFFICIENCY: ICD-10-CM

## 2023-07-20 DIAGNOSIS — R49.0 MUSCLE TENSION DYSPHONIA: ICD-10-CM

## 2023-07-20 DIAGNOSIS — K21.9 REFLUX LARYNGITIS: ICD-10-CM

## 2023-07-20 DIAGNOSIS — R49.0 DYSPHONIA: Primary | ICD-10-CM

## 2023-07-20 DIAGNOSIS — E89.0 HX OF TOTAL THYROIDECTOMY: ICD-10-CM

## 2023-07-20 DIAGNOSIS — J38.01 PARESIS OF RIGHT VOCAL FOLD: ICD-10-CM

## 2023-07-20 DIAGNOSIS — K21.9 GASTROESOPHAGEAL REFLUX DISEASE, UNSPECIFIED WHETHER ESOPHAGITIS PRESENT: ICD-10-CM

## 2023-07-20 PROCEDURE — 92507 TX SP LANG VOICE COMM INDIV: CPT

## 2023-07-20 NOTE — PROGRESS NOTES
Daily Speech Treatment Note    Today's date: 2023   Patient’s name: Mando Ellis  : 1960  MRN: [id number]  Safety measures: none  Referring provider: Jaylene Núñez MD    Encounter Diagnosis     ICD-10-CM    1. Dysphonia  R49.0       2. Paresis of right vocal fold  J38.01       3. Pharyngoesophageal dysphagia  R13.14       4. 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 Gastroesophageal reflux disease, unspecified whether esophagitis present  K21.9       6. Glottic insufficiency  J38.3       7. Hx of total thyroidectomy  E89.0       8. Reflux laryngitis  J04.0     K21.9         Visit tracking:  -Referring provider: Epic  -Billing guidelines: AMA  -Visit #3 [place]: Rosanne VINCENT ($25 co pay)  -RE due 9/3/2023    Subjective/Behavioral:  -Patient reports she is busy with her grandchild. She is trying to do her voice exercises 3x/week    Objective/Assessment:  -Reviewed patient's home exercises/activities completed since last appointment. See above. Short-term goals:  Patient will be educated on vocal hygiene and demonstrate understanding of recommendations to facilitate increased quality of voice, to be achieved in 2-4 weeks.     Patient will be educated on diaphragmatic breathing (versus clavicular breathing) to establish controlled, rhythmic breathing, to be achieved in 2-4 weeks.     Patient will utilize diaphragmatic breathing during oral sentence/paragraph reading in 80% of opportunities to facilitate increased breath support for voice/speaking, decrease laryngeal effort, and improve glottic closure to be achieved in 4-6 weeks.     Patient will utilize semi-occluded vocal tract exercises without laryngeal tension to promote healthy vocal function with 80% accuracy, to be achieved in 4-6 weeks. Patient will utilize a forward tone focused/resonant voice to decrease vocal hyperfunction and improve voice quality and vocal projection, to be achieved in 4-6 weeks.      Patient will self-report a decrease in muscle tension of the larynx and cervical area after independently completing relaxation/stretching exercise to be achieved in 6-8 weeks. Patient reporting "fullness" in her throat to be a 7/10  Presents with moderate BOT tension    Following patient's verbal consent to treat, manual therapy technique through myofascial release was applied to patient's BOT/Laryngeal area. Region(s) 7/20/23         Hyoid    x10         Anterior Cervical   x10         Palpatory examination revealed moderate mechanosensitivity (i.e., sensitivity to mechanical pressure/stretch) through the BOT region. A mild palpatory pressure was suspected to show a moderate area of apparent thickening that, with stimulation/pressure/stretch, reproduced patient's complaints/correlated to her primary issue of fullness. Patient verbalized that technique/pressure, through triggering her symptoms, was helpful. Pillow   Bolster  Dycem    Reviewed cervical stretches to complete at home as well. Plan:  -Patient was provided with home exercises/activities to target goals in plan of care at the end of today's session.  -Continue with current plan of care.

## 2023-07-25 ENCOUNTER — OFFICE VISIT (OUTPATIENT)
Dept: SPEECH THERAPY | Facility: CLINIC | Age: 63
End: 2023-07-25
Payer: COMMERCIAL

## 2023-07-25 DIAGNOSIS — R49.0 MUSCLE TENSION DYSPHONIA: ICD-10-CM

## 2023-07-25 DIAGNOSIS — R13.14 PHARYNGOESOPHAGEAL DYSPHAGIA: ICD-10-CM

## 2023-07-25 DIAGNOSIS — R49.0 DYSPHONIA: Primary | ICD-10-CM

## 2023-07-25 DIAGNOSIS — J38.01 PARESIS OF RIGHT VOCAL FOLD: ICD-10-CM

## 2023-07-25 DIAGNOSIS — K21.9 GASTROESOPHAGEAL REFLUX DISEASE, UNSPECIFIED WHETHER ESOPHAGITIS PRESENT: ICD-10-CM

## 2023-07-25 PROCEDURE — 92507 TX SP LANG VOICE COMM INDIV: CPT

## 2023-07-25 NOTE — PROGRESS NOTES
Daily Speech Treatment Note    Today's date: 2023   Patient’s name: Rosy Butcher  : 1960  MRN: [id number]  Safety measures: none  Referring provider: Skyler Rivera MD    Encounter Diagnosis     ICD-10-CM    1. Dysphonia  R49.0       2. Paresis of right vocal fold  J38.01       3. Pharyngoesophageal dysphagia  R13.14       4. 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 Gastroesophageal reflux disease, unspecified whether esophagitis present  K21.9         Visit tracking:  -Referring provider: Epic  -Billing guidelines: AMA  -Visit #4 [place]: Rosanne VINCENT ($25 co pay)  -RE due 9/3/2023    Subjective/Behavioral:  Patient in good spirits today - reports she has been doing her stretches    Objective/Assessment:  -Reviewed patient's home exercises/activities completed since last appointment. See above. Short-term goals:  Patient will be educated on vocal hygiene and demonstrate understanding of recommendations to facilitate increased quality of voice, to be achieved in 2-4 weeks.     Patient will be educated on diaphragmatic breathing (versus clavicular breathing) to establish controlled, rhythmic breathing, to be achieved in 2-4 weeks.     Patient will utilize diaphragmatic breathing during oral sentence/paragraph reading in 80% of opportunities to facilitate increased breath support for voice/speaking, decrease laryngeal effort, and improve glottic closure to be achieved in 4-6 weeks.     Patient will utilize semi-occluded vocal tract exercises without laryngeal tension to promote healthy vocal function with 80% accuracy, to be achieved in 4-6 weeks. Reviewed today     Patient will utilize a forward tone focused/resonant voice to decrease vocal hyperfunction and improve voice quality and vocal projection, to be achieved in 4-6 weeks.      Patient will self-report a decrease in muscle tension of the larynx and cervical area after independently completing relaxation/stretching exercise to be achieved in 6-8 weeks. Patient reporting "fullness" in her throat to be a 7/10  Presents with moderate BOT tension    Following patient's verbal consent to treat, manual therapy technique through myofascial release was applied to patient's BOT/Laryngeal area. Region(s) 7/20/23 7/25/23        Hyoid    x10 x10        Anterior Cervical   x10 x10        Palpatory examination revealed moderate mechanosensitivity (i.e., sensitivity to mechanical pressure/stretch) through the BOT region. A mild palpatory pressure was suspected to show a moderate area of apparent thickening that, with stimulation/pressure/stretch, reproduced patient's complaints/correlated to her primary issue of fullness. Patient verbalized that technique/pressure, through triggering her symptoms, was helpful. Pillow   Bolster  Lotion    End of session: 5.5/10 fullness reported (improved)    Plan:  -Patient was provided with home exercises/activities to target goals in plan of care at the end of today's session.  -Continue with current plan of care.

## 2023-08-01 ENCOUNTER — OFFICE VISIT (OUTPATIENT)
Dept: SPEECH THERAPY | Facility: CLINIC | Age: 63
End: 2023-08-01
Payer: COMMERCIAL

## 2023-08-01 DIAGNOSIS — R13.14 PHARYNGOESOPHAGEAL DYSPHAGIA: ICD-10-CM

## 2023-08-01 DIAGNOSIS — J38.01 PARESIS OF RIGHT VOCAL FOLD: ICD-10-CM

## 2023-08-01 DIAGNOSIS — R49.0 DYSPHONIA: Primary | ICD-10-CM

## 2023-08-01 DIAGNOSIS — J38.3 GLOTTIC INSUFFICIENCY: ICD-10-CM

## 2023-08-01 DIAGNOSIS — K21.9 GASTROESOPHAGEAL REFLUX DISEASE, UNSPECIFIED WHETHER ESOPHAGITIS PRESENT: ICD-10-CM

## 2023-08-01 DIAGNOSIS — R49.0 MUSCLE TENSION DYSPHONIA: ICD-10-CM

## 2023-08-01 PROCEDURE — 92507 TX SP LANG VOICE COMM INDIV: CPT

## 2023-08-01 NOTE — PROGRESS NOTES
Daily Speech Treatment Note    Today's date: 2023   Patient’s name: Nicholas Chow  : 1960  MRN: [id number]  Safety measures: none  Referring provider: Jaylin Sahu MD    Encounter Diagnosis     ICD-10-CM    1. Dysphonia  R49.0       2. Paresis of right vocal fold  J38.01       3. Pharyngoesophageal dysphagia  R13.14       4. 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 Gastroesophageal reflux disease, unspecified whether esophagitis present  K21.9       6. Glottic insufficiency  J38.3         Visit tracking:  -Referring provider: Epic  -Billing guidelines: AMA  -Visit #5 [place]: Rosanne VINCENT ($25 co pay)  -RE due 9/3/2023    Subjective/Behavioral:  Patient in good spirits today - reports she has been doing her stretches    Objective/Assessment:  -Reviewed patient's home exercises/activities completed since last appointment. See above. Short-term goals:  Patient will be educated on vocal hygiene and demonstrate understanding of recommendations to facilitate increased quality of voice, to be achieved in 2-4 weeks.     Patient will be educated on diaphragmatic breathing (versus clavicular breathing) to establish controlled, rhythmic breathing, to be achieved in 2-4 weeks.     Patient will utilize diaphragmatic breathing during oral sentence/paragraph reading in 80% of opportunities to facilitate increased breath support for voice/speaking, decrease laryngeal effort, and improve glottic closure to be achieved in 4-6 weeks.     Patient will utilize semi-occluded vocal tract exercises without laryngeal tension to promote healthy vocal function with 80% accuracy, to be achieved in 4-6 weeks. Reviewed today     Patient will utilize a forward tone focused/resonant voice to decrease vocal hyperfunction and improve voice quality and vocal projection, to be achieved in 4-6 weeks.      Patient will self-report a decrease in muscle tension of the larynx and cervical area after independently completing relaxation/stretching exercise to be achieved in 6-8 weeks. Patient reporting "fullness" in her throat to be a 7/10  Presents with moderate BOT tension    Following patient's verbal consent to treat, manual therapy technique through myofascial release was applied to patient's BOT/Laryngeal area. Region(s) 7/20/23 7/25/23 08/01/23       Hyoid    x10 x10 x10       Anterior Cervical   x10 x10 x10     Tongue   x5       Palpatory examination revealed moderate mechanosensitivity (i.e., sensitivity to mechanical pressure/stretch) through the BOT region. A mild palpatory pressure was suspected to show a moderate area of apparent thickening that, with stimulation/pressure/stretch, reproduced patient's complaints/correlated to her primary issue of fullness. Patient verbalized that technique/pressure, through triggering her symptoms, was helpful. Pillow   Bolster  Lotion      Plan:  -Patient was provided with home exercises/activities to target goals in plan of care at the end of today's session.  -Continue with current plan of care.

## 2023-08-08 ENCOUNTER — APPOINTMENT (OUTPATIENT)
Dept: SPEECH THERAPY | Facility: CLINIC | Age: 63
End: 2023-08-08
Payer: COMMERCIAL

## 2023-08-15 ENCOUNTER — APPOINTMENT (OUTPATIENT)
Dept: SPEECH THERAPY | Facility: CLINIC | Age: 63
End: 2023-08-15
Payer: COMMERCIAL

## 2023-08-21 ENCOUNTER — APPOINTMENT (OUTPATIENT)
Dept: RADIOLOGY | Facility: AMBULARY SURGERY CENTER | Age: 63
End: 2023-08-21
Payer: COMMERCIAL

## 2023-08-21 ENCOUNTER — OFFICE VISIT (OUTPATIENT)
Dept: OBGYN CLINIC | Facility: CLINIC | Age: 63
End: 2023-08-21
Payer: COMMERCIAL

## 2023-08-21 VITALS — HEART RATE: 61 BPM | SYSTOLIC BLOOD PRESSURE: 147 MMHG | DIASTOLIC BLOOD PRESSURE: 81 MMHG

## 2023-08-21 DIAGNOSIS — G89.29 CHRONIC PAIN OF BOTH KNEES: ICD-10-CM

## 2023-08-21 DIAGNOSIS — M25.561 CHRONIC PAIN OF BOTH KNEES: ICD-10-CM

## 2023-08-21 DIAGNOSIS — M79.642 LEFT HAND PAIN: ICD-10-CM

## 2023-08-21 DIAGNOSIS — M18.11 PRIMARY OSTEOARTHRITIS OF FIRST CARPOMETACARPAL JOINT OF RIGHT HAND: ICD-10-CM

## 2023-08-21 DIAGNOSIS — M25.562 CHRONIC PAIN OF LEFT KNEE: ICD-10-CM

## 2023-08-21 DIAGNOSIS — G89.29 CHRONIC PAIN OF LEFT KNEE: ICD-10-CM

## 2023-08-21 DIAGNOSIS — M12.9 ARTHROPATHY, MULTIPLE SITES: ICD-10-CM

## 2023-08-21 DIAGNOSIS — M25.562 CHRONIC PAIN OF BOTH KNEES: ICD-10-CM

## 2023-08-21 DIAGNOSIS — M17.0 PRIMARY OSTEOARTHRITIS OF BOTH KNEES: Primary | ICD-10-CM

## 2023-08-21 PROCEDURE — 99214 OFFICE O/P EST MOD 30 MIN: CPT | Performed by: PHYSICAL MEDICINE & REHABILITATION

## 2023-08-21 PROCEDURE — 73562 X-RAY EXAM OF KNEE 3: CPT

## 2023-08-21 PROCEDURE — 73130 X-RAY EXAM OF HAND: CPT

## 2023-08-21 PROCEDURE — 20610 DRAIN/INJ JOINT/BURSA W/O US: CPT | Performed by: PHYSICAL MEDICINE & REHABILITATION

## 2023-08-21 RX ORDER — TRIAMCINOLONE ACETONIDE 40 MG/ML
80 INJECTION, SUSPENSION INTRA-ARTICULAR; INTRAMUSCULAR
Status: COMPLETED | OUTPATIENT
Start: 2023-08-21 | End: 2023-08-21

## 2023-08-21 RX ORDER — ROPIVACAINE HYDROCHLORIDE 5 MG/ML
10 INJECTION, SOLUTION EPIDURAL; INFILTRATION; PERINEURAL
Status: COMPLETED | OUTPATIENT
Start: 2023-08-21 | End: 2023-08-21

## 2023-08-21 RX ORDER — CELECOXIB 200 MG/1
200 CAPSULE ORAL 2 TIMES DAILY PRN
Qty: 60 CAPSULE | Refills: 0 | Status: SHIPPED | OUTPATIENT
Start: 2023-08-21

## 2023-08-21 RX ADMIN — TRIAMCINOLONE ACETONIDE 80 MG: 40 INJECTION, SUSPENSION INTRA-ARTICULAR; INTRAMUSCULAR at 13:30

## 2023-08-21 RX ADMIN — ROPIVACAINE HYDROCHLORIDE 10 ML: 5 INJECTION, SOLUTION EPIDURAL; INFILTRATION; PERINEURAL at 13:30

## 2023-08-21 NOTE — PROGRESS NOTES
1. Primary osteoarthritis of both knees  Large joint arthrocentesis: bilateral knee    ropivacaine (NAROPIN) 0.5 % injection 10 mL    triamcinolone acetonide (KENALOG-40) 40 mg/mL injection 80 mg    ropivacaine (NAROPIN) 0.5 % injection 10 mL    triamcinolone acetonide (KENALOG-40) 40 mg/mL injection 80 mg      2. Chronic pain of left knee  XR knee 3 vw left non injury      3. Left hand pain  XR hand 3+ vw left      4. Arthropathy, multiple sites  celecoxib (CeleBREX) 200 mg capsule      5. Primary osteoarthritis of first carpometacarpal joint of right hand        6. Chronic pain of both knees          Orders Placed This Encounter   Procedures   • Large joint arthrocentesis: bilateral knee   • XR hand 3+ vw left   • XR knee 3 vw left non injury      Impression:  Patient is here in follow up of bilateral hand pain likely secondary to [facility] osteoarthritis. Treatment has included right first [facility] steroid injection, Voltaren gel and comfort cool gloves. Continue with Voltaren and comfort cool. Consider repeat injections if still symptomatic.      Patient also presents with bilateral knee pain likely secondary to osteoarthritis.  Treatment has included right knee steroid injection. Today we proceeded with bilateral knee steroid injections. Celebrex refilled.     Imaging Studies (I personally reviewed images in PACS and report):  Right hand x-rays most recent to this encounter reviewed. These images show severe osteoarthritis of the [facility] joint.     Right knee x-rays most recent to this encounter reviewed.  These images show tricompartmental osteoarthritis is a small joint effusion.  These findings are consistent with Peterson Vasquez grade 2 osteoarthritis. [place] is also a sesamoid bone consistent with a fabella. Left hand x-rays show severe 1st CMC joint OA. Scattered and severe IP joint osteoarthritis. No acute osseous abnormalities.     Left knee x-rays show medial joint space and patellofemoral joint space narrowing with osteophytes. Likely intraarticular loose body in the suprapatellar pouch. This is consistent with Virgtasneem Rafat grade 3 OA. No follow-ups on file. Patient is in agreement with the above plan. HPI:  Jocelynn Husain is a 58 y.o. female  who presents in follow up. Here for   Chief Complaint   Patient presents with   • Right Thumb - Pain, Follow-up   • Left Thumb - Pain, Follow-up   • Left Knee - Pain   • Right Knee - Pain       Since last visit: See above.     Following history reviewed and updated:  Past Medical History:   Diagnosis Date   • Arthritis    • Asthma    • BRCA1 negative    • BRCA2 negative    • Cancer (HCC)     thyroid cancer   • Colon polyp    • Disease of thyroid gland    • Fibroid    • History of COVID-19 01/04/2022    mild cold s/s   • Hyperlipidemia    • Hypertension    • Hypothyroidism    • PONV (postoperative nausea and vomiting)    • Postmenopausal bleeding 11/23/2021   • Seasonal allergies    • Varicella      Past Surgical History:   Procedure Laterality Date   • ADENOIDECTOMY     • BREAST BIOPSY     • BREAST CYST EXCISION Right 1991    Benign   • BREAST LUMPECTOMY     • VA HYSTEROSCOPY BX ENDOMETRIUM&/POLYPC W/WO D&C N/A 03/07/2022    Procedure: DILATATION AND CURETTAGE (D&C) WITH HYSTEROSCOPY, polypectomy;  Surgeon: Kaley Desai MD;  Location: [facility] MAIN OR;  Service: Gynecology   • THYROIDECTOMY     • TONSILLECTOMY     • VARICOSE VEIN SURGERY Bilateral      Social History   Social History     Substance and Sexual Activity   Alcohol Use Yes   • Alcohol/week: 1.0 standard drink of alcohol   • Types: 1 Glasses of wine per week    Comment: Only on holidays     Social History     Substance and Sexual Activity   Drug Use Never     Social History     Tobacco Use   Smoking Status Never   • Passive exposure: Never   Smokeless Tobacco Never     Family History   Problem Relation Age of Onset   • Breast cancer Mother    • Heart disease Mother    • Heart disease Father    • Stroke Father    • Kidney disease Father    • Breast cancer Sister    • Hypertension Sister    • No Known Problems Daughter    • No Known Problems Daughter    • Diabetes unspecified Maternal Grandmother    • Prostate cancer Maternal Grandfather    • Diabetes Maternal Aunt    • Diabetes Maternal Uncle    • Diabetes Paternal Aunt    • Breast cancer Cousin 39     Allergies   Allergen Reactions   • Avocado (Diagnostic) - Food Allergy Swelling     Tongue swells    • Citrullus Vulgaris Swelling     Tongue swells    • Pineapple - Food Allergy Swelling     Tongue swells   • Sulfa Antibiotics Rash        Constitutional:  /81   Pulse 61    General: NAD. Eyes: Clear sclerae. ENT: No inflammation, lesion, or mass of lips. No tracheal deviation. Musculoskeletal: As mentioned below. Integumentary: No visible rashes or skin lesions. Pulmonary/Chest: Effort normal. No respiratory distress. Neuro: CN's grossly intact, MASON. Psych: Normal affect and judgement. Vascular: WWP. Right Knee Exam     Tenderness   The patient is experiencing tenderness in the medial joint line. Range of Motion   Extension: normal     Tests   Varus: negative Valgus: negative    Other   Erythema: absent  Scars: absent  Sensation: normal  Pulse: present  Swelling: none  Effusion: no effusion present      Left Knee Exam     Tenderness   The patient is experiencing tenderness in the medial joint line. Range of Motion   Extension: normal     Tests   Varus: negative Valgus: negative    Other   Erythema: absent  Scars: absent  Sensation: normal  Pulse: present  Swelling: none  Effusion: no effusion present      Right Hand Exam     Tenderness   Right hand tenderness location: 1st CMC joint. Other   Erythema: absent  Scars: absent  Sensation: normal  Pulse: present      Left Hand Exam     Tenderness   Left hand tenderness location: 1st CMC joint.      Other   Erythema: absent  Scars: absent  Sensation: normal  Pulse: present             Large joint arthrocentesis: bilateral knee  Universal Protocol:  Consent: Verbal consent obtained. Written consent not obtained. Risks and benefits: risks, benefits and alternatives were discussed  Consent given by: patient  Timeout called at: 8/21/2023 1:36 PM.  Patient understanding: patient states understanding of the procedure being performed  Patient consent: the patient's understanding of the procedure matches consent given  Site marked: the operative site was marked  Radiology Images displayed and confirmed. If images not available, report reviewed: imaging studies available  Patient identity confirmed: verbally with patient    Supporting Documentation  Indications: pain   Procedure Details  Location: knee - bilateral knee  Needle size: 22 G  Ultrasound guidance: no  Approach: Inferolateral to patella. Medications (Right): 10 mL ropivacaine 0.5 %; 80 mg triamcinolone acetonide 40 mg/mLMedications (Left): 10 mL ropivacaine 0.5 %; 80 mg triamcinolone acetonide 40 mg/mL   Patient tolerance: patient tolerated the procedure well with no immediate complications  Dressing:  Sterile dressing applied    There was little to no resistance encountered during the injection. Risks of this procedure include:    - Risk of bleeding since a needle is involved. - Risk of infection (1/10,000 chance as per recent studies). Signs/symptoms were discussed and they would prompt an urgent evaluation at an emergency department.  - Risk of pigmentation or skin dimpling in the skin (2-3% chance as per recent studies) from the steroid. - Risk of increased pain from steroid flare (1% chance as per recent studies) that typically lasts 24-48 hours. - Risk of increased blood sugars from the steroid medication that can last for a few weeks. If the patient is a diabetic or pre-diabetic, they were encouraged to closely monitor their blood sugars and discuss with PCP if elevated more than usual or if having symptoms.     The benefits outweigh the risks and so the procedure was completed.

## 2023-08-22 ENCOUNTER — APPOINTMENT (OUTPATIENT)
Dept: SPEECH THERAPY | Facility: CLINIC | Age: 63
End: 2023-08-22
Payer: COMMERCIAL

## 2023-08-29 ENCOUNTER — APPOINTMENT (OUTPATIENT)
Dept: SPEECH THERAPY | Facility: CLINIC | Age: 63
End: 2023-08-29
Payer: COMMERCIAL

## 2023-08-31 ENCOUNTER — TELEPHONE (OUTPATIENT)
Dept: OBGYN CLINIC | Facility: CLINIC | Age: 63
End: 2023-08-31

## 2023-08-31 DIAGNOSIS — N95.0 POST-MENOPAUSAL BLEEDING: Primary | ICD-10-CM

## 2023-08-31 NOTE — TELEPHONE ENCOUNTER
Patient called, she states she saw you aprox 1-2 years ago regarding PMB and had a D&C done. She states she started spotting 2 days ago. She says it's only when she wipes and it is a pink/clear color. She did have a CT Urogram done at [facility] that showed prominence of the endometrium and it was recommended to have a pelvic U/S. Do you want me to order a pelvic U/S and schedule her an apt for her for a biopsy?

## 2023-09-06 ENCOUNTER — HOSPITAL ENCOUNTER (OUTPATIENT)
Dept: RADIOLOGY | Age: 63
Discharge: HOME | End: 2023-09-06
Payer: COMMERCIAL

## 2023-09-06 DIAGNOSIS — N95.0 POST-MENOPAUSAL BLEEDING: ICD-10-CM

## 2023-09-06 PROCEDURE — 76856 US EXAM PELVIC COMPLETE: CPT

## 2023-09-06 PROCEDURE — 76830 TRANSVAGINAL US NON-OB: CPT

## 2023-09-25 ENCOUNTER — PROCEDURE VISIT (OUTPATIENT)
Dept: OBGYN CLINIC | Facility: CLINIC | Age: 63
End: 2023-09-25
Payer: COMMERCIAL

## 2023-09-25 VITALS
SYSTOLIC BLOOD PRESSURE: 130 MMHG | HEIGHT: 66 IN | DIASTOLIC BLOOD PRESSURE: 78 MMHG | BODY MASS INDEX: 34.23 KG/M2 | WEIGHT: 213 LBS

## 2023-09-25 DIAGNOSIS — N95.0 POST-MENOPAUSAL BLEEDING: Primary | ICD-10-CM

## 2023-09-25 PROCEDURE — 88305 TISSUE EXAM BY PATHOLOGIST: CPT | Performed by: STUDENT IN AN ORGANIZED HEALTH CARE EDUCATION/TRAINING PROGRAM

## 2023-09-25 PROCEDURE — 58100 BIOPSY OF UTERUS LINING: CPT | Performed by: OBSTETRICS & GYNECOLOGY

## 2023-09-25 RX ORDER — CALCIUM CARBONATE/VITAMIN D3 500-10/5ML
LIQUID (ML) ORAL EVERY 24 HOURS
COMMUNITY

## 2023-09-25 RX ORDER — TROSPIUM CHLORIDE 20 MG/1
TABLET, FILM COATED ORAL
COMMUNITY
Start: 2023-09-24

## 2023-09-25 RX ORDER — TROSPIUM CHLORIDE 20 MG/1
20 TABLET, FILM COATED ORAL 2 TIMES DAILY
COMMUNITY
Start: 2023-08-31 | End: 2024-08-30

## 2023-09-25 NOTE — PROGRESS NOTES
Endometrial biopsy    Date/Time: 9/25/2023 11:00 AM    Performed by: Kj Conley MD  Authorized by: jK Conley MD  Universal Protocol:  Consent: Verbal consent obtained. Written consent obtained. Risks and benefits: risks, benefits and alternatives were discussed (Uterine perforation, insufficient sampling, need for repeat procedure)  Consent given by: patient  Patient understanding: patient states understanding of the procedure being performed  Patient consent: the patient's understanding of the procedure matches consent given  Procedure consent: procedure consent matches procedure scheduled  Required items: required blood products, implants, devices, and special equipment available  Patient identity confirmed: verbally with patient      Indication:     Indications: Post-menopausal bleeding      Chronicity of post-menopausal bleeding:  Recurrent    Progression of post-menopausal bleeding:  Resolved  Procedure:     Procedure: endometrial biopsy with Pip[place]      A bivalve speculum was placed in the vagina: yes      Cervix cleaned and prepped: yes      Uterus sounded: yes      Uterus sound depth (cm):  7    Specimen collected: specimen collected and sent to pathology      Patient tolerated procedure well with no complications: yes    Findings:     Uterus size:  Non-gravid    Cervix: normal        Diagnoses and all orders for this visit:    Post-menopausal bleeding  -     Tissue Exam  -     Endometrial biopsy    Other orders  -     Magnesium Oxide 400 MG CAPS; every 24 hours  -     trospium chloride (SANCTURA) 20 mg tablet; Take 20 mg by mouth 2 (two) times a day  -     trospium chloride (SANCTURA) 20 mg tablet      Discussed recurrence of postmenopausal bleeding and possible recurrence of endometrial polyp. I removed a large endometrial polyp from her uterine cavity in March 2022.  Likely a recurrence of endometrial polyp but possible progression to endometrial hyperplasia was discussed with patient. Discussed definitive treatment with hysterectomy and patient is amenable. Will review tissue biopsy results, though scant tissue collected on biopsy. Plan for robotic assisted TLH, BSO and cystoscopy to be performed.

## 2023-09-28 PROCEDURE — 88305 TISSUE EXAM BY PATHOLOGIST: CPT | Performed by: STUDENT IN AN ORGANIZED HEALTH CARE EDUCATION/TRAINING PROGRAM

## 2023-10-02 DIAGNOSIS — I10 ESSENTIAL HYPERTENSION: ICD-10-CM

## 2023-10-02 RX ORDER — VALSARTAN AND HYDROCHLOROTHIAZIDE 80; 12.5 MG/1; MG/1
TABLET, FILM COATED ORAL
Qty: 90 TABLET | Refills: 0 | Status: SHIPPED | OUTPATIENT
Start: 2023-10-02

## 2023-11-10 ENCOUNTER — HOSPITAL ENCOUNTER (OUTPATIENT)
Dept: ULTRASOUND IMAGING | Facility: CLINIC | Age: 63
Discharge: HOME | End: 2023-11-10
Payer: COMMERCIAL

## 2023-11-10 ENCOUNTER — HOSPITAL ENCOUNTER (OUTPATIENT)
Dept: MAMMOGRAPHY | Facility: CLINIC | Age: 63
Discharge: HOME | End: 2023-11-10
Payer: COMMERCIAL

## 2023-11-10 DIAGNOSIS — R92.2 DENSE BREASTS: ICD-10-CM

## 2023-11-10 DIAGNOSIS — R92.30 DENSE BREASTS: ICD-10-CM

## 2023-11-10 DIAGNOSIS — Z12.31 ENCOUNTER FOR SCREENING MAMMOGRAM FOR MALIGNANT NEOPLASM OF BREAST: ICD-10-CM

## 2023-11-10 DIAGNOSIS — Z91.89 AT INCREASED RISK OF BREAST CANCER: ICD-10-CM

## 2023-11-10 PROCEDURE — 77067 SCR MAMMO BI INCL CAD: CPT

## 2023-11-10 PROCEDURE — 77063 BREAST TOMOSYNTHESIS BI: CPT

## 2023-11-10 PROCEDURE — 76641 ULTRASOUND BREAST COMPLETE: CPT

## 2023-11-12 LAB
ALBUMIN SERPL-MCNC: 4.3 G/DL (ref 3.9–4.9)
ALBUMIN/GLOB SERPL: 1.7 {RATIO} (ref 1.2–2.2)
ALP SERPL-CCNC: 123 IU/L (ref 44–121)
ALT SERPL-CCNC: 15 IU/L (ref 0–32)
AST SERPL-CCNC: 16 IU/L (ref 0–40)
BASOPHILS # BLD AUTO: 0.1 X10E3/UL (ref 0–0.2)
BASOPHILS NFR BLD AUTO: 1 %
BILIRUB SERPL-MCNC: 0.4 MG/DL (ref 0–1.2)
BUN SERPL-MCNC: 18 MG/DL (ref 8–27)
BUN/CREAT SERPL: 19 (ref 12–28)
CALCIUM SERPL-MCNC: 9.8 MG/DL (ref 8.7–10.3)
CHLORIDE SERPL-SCNC: 105 MMOL/L (ref 96–106)
CHOLEST SERPL-MCNC: 150 MG/DL (ref 100–199)
CO2 SERPL-SCNC: 26 MMOL/L (ref 20–29)
CREAT SERPL-MCNC: 0.95 MG/DL (ref 0.57–1)
EGFR: 67 ML/MIN/1.73
EOSINOPHIL # BLD AUTO: 0.1 X10E3/UL (ref 0–0.4)
EOSINOPHIL NFR BLD AUTO: 2 %
ERYTHROCYTE [DISTWIDTH] IN BLOOD BY AUTOMATED COUNT: 13 % (ref 11.7–15.4)
GLOBULIN SER-MCNC: 2.5 G/DL (ref 1.5–4.5)
GLUCOSE SERPL-MCNC: 91 MG/DL (ref 70–99)
HBA1C MFR BLD: 5.7 % (ref 4.8–5.6)
HCT VFR BLD AUTO: 38.2 % (ref 34–46.6)
HDLC SERPL-MCNC: 62 MG/DL
HGB BLD-MCNC: 12.9 G/DL (ref 11.1–15.9)
IMM GRANULOCYTES # BLD: 0 X10E3/UL (ref 0–0.1)
IMM GRANULOCYTES NFR BLD: 0 %
LDLC SERPL CALC-MCNC: 72 MG/DL (ref 0–99)
LYMPHOCYTES # BLD AUTO: 2.1 X10E3/UL (ref 0.7–3.1)
LYMPHOCYTES NFR BLD AUTO: 31 %
MCH RBC QN AUTO: 29.7 PG (ref 26.6–33)
MCHC RBC AUTO-ENTMCNC: 33.8 G/DL (ref 31.5–35.7)
MCV RBC AUTO: 88 FL (ref 79–97)
MONOCYTES # BLD AUTO: 0.4 X10E3/UL (ref 0.1–0.9)
MONOCYTES NFR BLD AUTO: 5 %
NEUTROPHILS # BLD AUTO: 4 X10E3/UL (ref 1.4–7)
NEUTROPHILS NFR BLD AUTO: 61 %
PLATELET # BLD AUTO: 231 X10E3/UL (ref 150–450)
POTASSIUM SERPL-SCNC: 4.4 MMOL/L (ref 3.5–5.2)
PROT SERPL-MCNC: 6.8 G/DL (ref 6–8.5)
RBC # BLD AUTO: 4.34 X10E6/UL (ref 3.77–5.28)
SL AMB VLDL CHOLESTEROL CALC: 16 MG/DL (ref 5–40)
SODIUM SERPL-SCNC: 144 MMOL/L (ref 134–144)
TRIGL SERPL-MCNC: 85 MG/DL (ref 0–149)
WBC # BLD AUTO: 6.7 X10E3/UL (ref 3.4–10.8)

## 2023-11-13 NOTE — RESULT ENCOUNTER NOTE
BIRADS 2 - benign mammo. Repeat screening in 1 year. Elevated TC risk - 27.5%. ABUS recommended annually.

## 2023-11-22 ENCOUNTER — PREP FOR PROCEDURE (OUTPATIENT)
Dept: OBGYN CLINIC | Facility: CLINIC | Age: 63
End: 2023-11-22

## 2023-11-22 DIAGNOSIS — N95.0 PMB (POSTMENOPAUSAL BLEEDING): ICD-10-CM

## 2023-11-22 DIAGNOSIS — Z01.812 ENCOUNTER FOR PRE-OPERATIVE LABORATORY TESTING: Primary | ICD-10-CM

## 2023-11-23 RX ORDER — PHENAZOPYRIDINE HYDROCHLORIDE 100 MG/1
100 TABLET, FILM COATED ORAL ONCE
OUTPATIENT
Start: 2023-11-23

## 2023-11-23 RX ORDER — GABAPENTIN 100 MG/1
200 CAPSULE ORAL ONCE
OUTPATIENT
Start: 2023-11-23 | End: 2023-11-23

## 2023-11-23 RX ORDER — ACETAMINOPHEN 325 MG/1
975 TABLET ORAL ONCE
OUTPATIENT
Start: 2023-11-23 | End: 2023-11-23

## 2023-12-07 ENCOUNTER — OFFICE VISIT (OUTPATIENT)
Dept: FAMILY MEDICINE CLINIC | Facility: CLINIC | Age: 63
End: 2023-12-07
Payer: COMMERCIAL

## 2023-12-07 VITALS
DIASTOLIC BLOOD PRESSURE: 82 MMHG | HEART RATE: 67 BPM | TEMPERATURE: 97.9 F | BODY MASS INDEX: 35.27 KG/M2 | WEIGHT: 219.5 LBS | HEIGHT: 66 IN | SYSTOLIC BLOOD PRESSURE: 142 MMHG | OXYGEN SATURATION: 99 %

## 2023-12-07 DIAGNOSIS — R06.83 SNORING: ICD-10-CM

## 2023-12-07 DIAGNOSIS — E66.9 OBESITY (BMI 35.0-39.9 WITHOUT COMORBIDITY): ICD-10-CM

## 2023-12-07 DIAGNOSIS — Z23 ENCOUNTER FOR IMMUNIZATION: ICD-10-CM

## 2023-12-07 DIAGNOSIS — I10 ESSENTIAL HYPERTENSION: ICD-10-CM

## 2023-12-07 DIAGNOSIS — Z00.00 ANNUAL PHYSICAL EXAM: Primary | ICD-10-CM

## 2023-12-07 PROCEDURE — 90471 IMMUNIZATION ADMIN: CPT | Performed by: FAMILY MEDICINE

## 2023-12-07 PROCEDURE — 99396 PREV VISIT EST AGE 40-64: CPT | Performed by: FAMILY MEDICINE

## 2023-12-07 PROCEDURE — 90686 IIV4 VACC NO PRSV 0.5 ML IM: CPT | Performed by: FAMILY MEDICINE

## 2023-12-07 NOTE — PROGRESS NOTES
ADULT ANNUAL Omar Kelly    NAME: Kulwinder Friedman  AGE: 58 y.o. SEX: female  : 1960   DATE: 2023     Assessment and Plan:     Problem List Items Addressed This Visit        Cardiovascular and Mediastinum    Essential hypertension     Chronic, above goal  Obtain upper arm cuff - check 3 x weekly for 2 weeks and submit logs. May wait until after the holiday. Continue valsartan-HCTZ 80-12.5mg             Other    Obesity (BMI 35.0-39.9 without comorbidity)   Other Visit Diagnoses     Annual physical exam    -  Primary    Encounter for immunization        Relevant Orders    influenza vaccine, quadrivalent, 0.5 mL, preservative-free, for adult and pediatric patients 6 mos+ (AFLURIA, FLUARIX, FLULAVAL, FLUZONE) (Completed)    Snoring        Relevant Orders    Home Study          Immunizations and preventive care screenings were discussed with patient today. Appropriate education was printed on patient's after visit summary. Counseling:  Alcohol/drug use: discussed moderation in alcohol intake, the recommendations for healthy alcohol use, and avoidance of illicit drug use. Dental Health: discussed importance of regular tooth brushing, flossing, and dental visits. Exercise: the importance of regular exercise/physical activity was discussed. Recommend exercise 3-5 times per week for at least 30 minutes. BMI Counseling: Body mass index is 35.43 kg/m². The BMI is above normal. Nutrition recommendations include decreasing portion sizes, encouraging healthy choices of fruits and vegetables and limiting drinks that contain sugar. Exercise recommendations include moderate physical activity 150 minutes/week, exercising 3-5 times per week and strength training exercises. Patient referred to PCP. Rationale for BMI follow-up plan is due to patient being overweight or obese.          Return in about 6 months (around 2024) for Next scheduled follow up. Chief Complaint:     Chief Complaint   Patient presents with   • Physical Exam   • Flu Vaccine      History of Present Illness:     Adult Annual Physical   Patient here for a comprehensive physical exam. The patient reports problems - upcoming hysterectomy. Bladder mass that is being followed by urology. She has poor sleep - frequent wakings at night. .    Diet and Physical Activity  Diet/Nutrition: well balanced diet. Exercise: walking. Depression Screening  PHQ-2/9 Depression Screening    Little interest or pleasure in doing things: 0 - not at all  Feeling down, depressed, or hopeless: 0 - not at all  PHQ-2 Score: 0  PHQ-2 Interpretation: Negative depression screen       General Health  Sleep: sleeps poorly, snores loudly, experiences daytime hypersomnolence, and unrefreshing sleep. Hearing: normal - bilateral.  Vision: most recent eye exam <1 year ago and wears glasses. Dental: regular dental visits. /GYN Health  Follows with gynecology? yes   Patient is: postmenopausal  Last menstrual period: No LMP recorded. Patient is postmenopausal.     Review of Systems:     Review of Systems   Constitutional:  Negative for activity change, chills and fever. HENT:  Negative for congestion, rhinorrhea and sore throat. Eyes:  Negative for visual disturbance. Respiratory:  Negative for cough, shortness of breath and wheezing. Cardiovascular:  Negative for chest pain and palpitations. Gastrointestinal:  Negative for abdominal pain, blood in stool, constipation, diarrhea, nausea and vomiting. Genitourinary:  Negative for dysuria. Musculoskeletal:  Negative for arthralgias and myalgias. Skin:  Negative for rash. Neurological:  Negative for dizziness, weakness and headaches. Psychiatric/Behavioral:  Positive for sleep disturbance. All other systems reviewed and are negative.      Past Medical History:     Past Medical History:   Diagnosis Date   • Arthritis    • Asthma    • BRCA1 negative    • BRCA2 negative    • Cancer (HCC)     thyroid cancer   • Colon polyp    • Disease of thyroid gland    • Fibroid    • History of COVID-19 01/04/2022    mild cold s/s   • Hyperlipidemia    • Hypertension    • Hypothyroidism    • Postmenopausal bleeding 11/23/2021   • Varicella       Past Surgical History:     Past Surgical History:   Procedure Laterality Date   • ADENOIDECTOMY     • BREAST BIOPSY     • BREAST CYST EXCISION Right 1991    Benign   • BREAST LUMPECTOMY     • [place] HYSTEROSCOPY BX ENDOMETRIUM&/POLYPC W/WO D&C N/A 03/07/2022    Procedure: DILATATION AND CURETTAGE (D&C) WITH HYSTEROSCOPY, polypectomy;  Surgeon: Susi Aguiar MD;  Location: [facility] OR;  Service: Gynecology   • THYROIDECTOMY     • TONSILLECTOMY     • VARICOSE VEIN SURGERY Bilateral       Social History:     Social History     Socioeconomic History   • Marital status: /Civil Union     Spouse name: None   • Number of children: None   • Years of education: None   • Highest education level: None   Occupational History   • None   Tobacco Use   • Smoking status: Never     Passive exposure: Never   • Smokeless tobacco: Never   Vaping Use   • Vaping Use: Never used   Substance and Sexual Activity   • Alcohol use:  Yes     Alcohol/week: 1.0 standard drink of alcohol     Types: 1 Glasses of wine per week     Comment: Only on holidays   • Drug use: Never   • Sexual activity: Yes     Partners: Male     Birth control/protection: Post-menopausal   Other Topics Concern   • None   Social History Narrative   • None     Social Determinants of Health     Financial Resource Strain: Not on file   Food Insecurity: Not on file   Transportation Needs: Not on file   Physical Activity: Not on file   Stress: Not on file   Social Connections: Not on file   Intimate Partner Violence: Not on file   Housing Stability: Not on file      Family History:     Family History   Problem Relation Age of Onset   • Breast cancer Mother    • Heart disease Mother    • Heart disease Father    • Stroke Father    • Kidney disease Father    • Breast cancer Sister    • Hypertension Sister    • No Known Problems Daughter    • No Known Problems Daughter    • Diabetes unspecified Maternal Grandmother    • Prostate cancer Maternal Grandfather    • Diabetes Maternal Aunt    • Diabetes Maternal Uncle    • Diabetes Paternal Aunt    • Breast cancer Cousin 39      Current Medications:     Current Outpatient Medications   Medication Sig Dispense Refill   • albuterol (Ventolin HFA) 90 mcg/act inhaler Inhale 2 puffs every 6 (six) hours as needed for wheezing 18 g 2   • atorvastatin (LIPITOR) 10 mg tablet take 1 tablet by mouth once daily 90 tablet 1   • celecoxib (CeleBREX) 200 mg capsule Take 1 capsule (200 mg total) by mouth 2 (two) times a day as needed for moderate pain 60 capsule 0   • cetirizine (ZyrTEC) 10 mg tablet as needed       • cimetidine (TAGAMET) 400 mg tablet Take 1 tablet (400 mg total) by mouth daily at bedtime 90 tablet 3   • fluticasone (FLONASE) 50 mcg/act nasal spray 2 sprays into each nostril as needed for rhinitis 16 g 3   • Gemtesa 75 MG TABS Take 1 tablet by mouth daily     • omeprazole (PriLOSEC) 40 MG capsule Take 1 capsule (40 mg total) by mouth daily 90 capsule 3   • Synthroid 200 MCG tablet TAKE 1 TABLET EVERY MORNING FOR POSTPROCEDURAL HYPOTHYROIDISM 90 tablet 1   • valsartan-hydrochlorothiazide (DIOVAN-HCT) 80-12.5 MG per tablet take 1 tablet by mouth once daily 90 tablet 0     No current facility-administered medications for this visit. Allergies:      Allergies   Allergen Reactions   • Avocado (Diagnostic) - Food Allergy Swelling     Tongue swells   • Citrullus Vulgaris Swelling     Tongue swells   • Pineapple - Food Allergy Swelling     Tongue swells   • Sulfa Antibiotics Rash      Physical Exam:     /82   Pulse 67   Temp 97.9 °F (36.6 °C)   Ht 5' 6" (1.676 m)   Wt 99.6 kg (219 lb 8 oz)   SpO2 99%   BMI 35.43 kg/m²     Physical Exam  Vitals and nursing note reviewed. Constitutional:       General: She is not in acute distress. Appearance: She is well-developed. She is obese. She is not ill-appearing. HENT:      Head: Normocephalic and atraumatic. Right Ear: Tympanic membrane, ear canal and external ear normal. No middle ear effusion. Left Ear: Tympanic membrane, ear canal and external ear normal.  No middle ear effusion. Nose: Nose normal. No congestion or rhinorrhea. Mouth/Throat:      Lips: Pink. Mouth: Mucous membranes are moist.      Pharynx: Oropharynx is clear. Uvula midline. No oropharyngeal exudate. Tonsils: No tonsillar exudate. Eyes:      General: Lids are normal.      Extraocular Movements: Extraocular movements intact. Conjunctiva/sclera: Conjunctivae normal.      Pupils: Pupils are equal, round, and reactive to light. Neck:      Thyroid: No thyromegaly. Trachea: No tracheal deviation. Cardiovascular:      Rate and Rhythm: Normal rate and regular rhythm. Pulses: Normal pulses. Heart sounds: Normal heart sounds, S1 normal and S2 normal. No murmur heard. Pulmonary:      Effort: Pulmonary effort is normal. No respiratory distress. Breath sounds: Normal breath sounds. No decreased breath sounds, wheezing, rhonchi or rales. Abdominal:      General: Bowel sounds are normal. There is no distension. Palpations: Abdomen is soft. Tenderness: There is no abdominal tenderness. Musculoskeletal:      Right lower leg: No edema. Left lower leg: No edema. Lymphadenopathy:      Cervical: No cervical adenopathy. Skin:     General: Skin is warm and dry. Capillary Refill: Capillary refill takes less than 2 seconds. Neurological:      Mental Status: She is alert and oriented to person, place, and time. Deep Tendon Reflexes: Reflexes normal.      Reflex Scores:       Patellar reflexes are 2+ on the right side and 2+ on the left side.   Psychiatric: Attention and Perception: Attention normal.         Mood and Affect: Mood normal.         Thought Content: Thought content does not include suicidal ideation.           Eliud Robins MD  [address]

## 2023-12-07 NOTE — ASSESSMENT & PLAN NOTE
Chronic, above goal  Obtain upper arm cuff - check 3 x weekly for 2 weeks and submit logs. May wait until after the holiday.    Continue valsartan-HCTZ 80-12.5mg

## 2023-12-07 NOTE — PATIENT INSTRUCTIONS
Blood pressure cuff: [employer]     Wellness Visit for Adults   AMBULATORY CARE:   A wellness visit  is when you see your healthcare provider to get screened for health problems. Your healthcare provider will also give you advice on how to stay healthy. Write down your questions so you remember to ask them. Ask your healthcare provider how often you should have a wellness visit. What happens at a wellness visit:  Your healthcare provider will ask about your health, and your family history of health problems. This includes high blood pressure, heart disease, and cancer. He or she will ask if you have symptoms that concern you, if you smoke, and about your mood. You may also be asked about your intake of medicines, supplements, food, and alcohol. Any of the following may be done: Your weight  will be checked. Your height may also be checked so your body mass index (BMI) can be calculated. Your BMI shows if you are at a healthy weight. Your blood pressure  and heart rate will be checked. Your temperature may also be checked. Blood and urine tests  may be done. Blood tests may be done to check your cholesterol levels. Abnormal cholesterol levels increase your risk for heart disease and stroke. You may also need a blood or urine test to check for diabetes if you are at increased risk. Urine tests may be done to look for signs of an infection or kidney disease. A physical exam  includes checking your heartbeat and lungs with a stethoscope. Your healthcare provider may also check your skin to look for sun damage. Screening tests  may be recommended. A screening test is done to check for diseases that may not cause symptoms. The screening tests you may need depend on your age, gender, family history, and lifestyle habits. For example, colorectal screening may be recommended if you are 48years old or older. Screening tests you need if you are a woman:   A Pap smear  is used to screen for cervical cancer.  Pap smears are usually done every 3 to 5 years depending on your age. You may need them more often if you have had abnormal Pap smear test results in the past. Ask your healthcare provider how often you should have a Pap smear. A mammogram  is an x-ray of your breasts to screen for breast cancer. Experts recommend mammograms every 2 years starting at age 48 years. You may need a mammogram at age 52 years or younger if you have an increased risk for breast cancer. Talk to your healthcare provider about when you should start having mammograms and how often you need them. Vaccines you may need:   Get an influenza vaccine  every year. The influenza vaccine protects you from the flu. Several types of viruses cause the flu. The viruses change over time, so new vaccines are made each year. Get a tetanus-diphtheria (Td) booster vaccine  every 10 years. This vaccine protects you against tetanus and diphtheria. Tetanus is a severe infection that may cause painful muscle spasms and lockjaw. Diphtheria is a severe bacterial infection that causes a thick covering in the back of your mouth and throat. Get a human papillomavirus (HPV) vaccine  if you are female and aged 23 to 32 or male 23 to 24 and never received it. This vaccine protects you from HPV infection. HPV is the most common infection spread by sexual contact. HPV may also cause vaginal, penile, and anal cancers. Get a pneumococcal vaccine  if you are aged 72 years or older. The pneumococcal vaccine is an injection given to protect you from pneumococcal disease. Pneumococcal disease is an infection caused by pneumococcal bacteria. The infection may cause pneumonia, meningitis, or an ear infection. Get a shingles vaccine  if you are 60 or older, even if you have had shingles before. The shingles vaccine is an injection to protect you from the varicella-zoster virus. This is the same virus that causes chickenpox.  Shingles is a painful rash that develops in people who had chickenpox or have been exposed to the virus. How to eat healthy:  My Plate is a model for planning healthy meals. It shows the types and amounts of foods that should go on your plate. Fruits and vegetables make up about half of your plate, and grains and protein make up the other half. A serving of dairy is included on the side of your plate. The amount of calories and serving sizes you need depends on your age, gender, weight, and height. Examples of healthy foods are listed below:  Eat a variety of vegetables  such as dark green, red, and orange vegetables. You can also include canned vegetables low in sodium (salt) and frozen vegetables without added butter or sauces. Eat a variety of fresh fruits , canned fruit in 100% juice, frozen fruit, and dried fruit. Include whole grains. At least half of the grains you eat should be whole grains. Examples include whole-wheat bread, wheat pasta, brown rice, and whole-grain cereals such as oatmeal.    Eat a variety of protein foods such as seafood (fish and shellfish), lean meat, and poultry without skin (turkey and chicken). Examples of lean meats include pork leg, shoulder, or tenderloin, and beef round, sirloin, tenderloin, and extra lean ground beef. Other protein foods include eggs and egg substitutes, beans, peas, soy products, nuts, and seeds. Choose low-fat dairy products such as skim or 1% milk or low-fat yogurt, cheese, and cottage cheese. Limit unhealthy fats  such as butter, hard margarine, and shortening. Exercise:  Exercise at least 30 minutes per day on most days of the week. Some examples of exercise include walking, biking, dancing, and swimming. You can also fit in more physical activity by taking the stairs instead of the elevator or parking farther away from stores. Include muscle strengthening activities 2 days each week. Regular exercise provides many health benefits.  It helps you manage your weight, and decreases your risk for type 2 diabetes, heart disease, stroke, and high blood pressure. Exercise can also help improve your mood. Ask your healthcare provider about the best exercise plan for you. General health and safety guidelines:   Do not smoke. Nicotine and other chemicals in cigarettes and cigars can cause lung damage. Ask your healthcare provider for information if you currently smoke and need help to quit. E-cigarettes or smokeless tobacco still contain nicotine. Talk to your healthcare provider before you use these products. Limit alcohol. A drink of alcohol is 12 ounces of beer, 5 ounces of wine, or 1½ ounces of liquor. Lose weight, if needed. Being overweight increases your risk of certain health conditions. These include heart disease, high blood pressure, type 2 diabetes, and certain types of cancer. Protect your skin. Do not sunbathe or use tanning beds. Use sunscreen with a SPF 15 or higher. Apply sunscreen at least 15 minutes before you go outside. Reapply sunscreen every 2 hours. Wear protective clothing, hats, and sunglasses when you are outside. Drive safely. Always wear your seatbelt. Make sure everyone in your car wears a seatbelt. A seatbelt can save your life if you are in an accident. Do not use your cell phone when you are driving. This could distract you and cause an accident. Pull over if you need to make a call or send a text message. Practice safe sex. Use latex condoms if are sexually active and have more than one partner. Your healthcare provider may recommend screening tests for sexually transmitted infections (STIs). Wear helmets, lifejackets, and protective gear. Always wear a helmet when you ride a bike or motorcycle, go skiing, or play sports that could cause a head injury. Wear protective equipment when you play sports. Wear a lifejacket when you are on a boat or doing water sports.     © Copyright Tanika Ranks 2023 Information is for End User's use only and may not be sold, redistributed or otherwise used for commercial purposes. The above information is an  only. It is not intended as medical advice for individual conditions or treatments. Talk to your doctor, nurse or pharmacist before following any medical regimen to see if it is safe and effective for you.

## 2023-12-28 DIAGNOSIS — E78.00 HYPERCHOLESTEREMIA: ICD-10-CM

## 2023-12-28 RX ORDER — ATORVASTATIN CALCIUM 10 MG/1
TABLET, FILM COATED ORAL
Qty: 90 TABLET | Refills: 1 | Status: SHIPPED | OUTPATIENT
Start: 2023-12-28

## 2023-12-29 DIAGNOSIS — I10 ESSENTIAL HYPERTENSION: ICD-10-CM

## 2023-12-29 RX ORDER — VALSARTAN AND HYDROCHLOROTHIAZIDE 80; 12.5 MG/1; MG/1
TABLET, FILM COATED ORAL
Qty: 90 TABLET | Refills: 0 | Status: SHIPPED | OUTPATIENT
Start: 2023-12-29

## 2024-01-06 PROBLEM — H93.13 TINNITUS OF BOTH EARS: Status: ACTIVE | Noted: 2024-01-06

## 2024-01-06 PROBLEM — H90.3 SENSORINEURAL HEARING LOSS (SNHL) OF BOTH EARS: Status: ACTIVE | Noted: 2024-01-06

## 2024-02-08 ENCOUNTER — TELEPHONE (OUTPATIENT)
Dept: SLEEP CENTER | Facility: CLINIC | Age: 64
End: 2024-02-08

## 2024-02-08 LAB
T4 FREE SERPL-MCNC: 1.63 NG/DL (ref 0.82–1.77)
THYROGLOB AB SERPL-ACNC: 7.9 IU/ML (ref 0–0.9)
TSH SERPL DL<=0.005 MIU/L-ACNC: 0.14 UIU/ML (ref 0.45–4.5)

## 2024-02-08 NOTE — TELEPHONE ENCOUNTER
----- Message from Ronnell Shelby MD sent at 2/8/2024 12:37 PM EST -----  Approved  ----- Message -----  From: Jeanna Chatman  Sent: 2/8/2024   8:54 AM EST  To: Sleep Medicine [place] Provider    This home sleep study needs approval.     If approved please sign and return to clerical pool.    If denied please include reasons why.  Also provide alternative testing if warranted. Please sign and return to clerical pool.

## 2024-02-13 ENCOUNTER — TELEMEDICINE (OUTPATIENT)
Dept: ENDOCRINOLOGY | Facility: CLINIC | Age: 64
End: 2024-02-13
Payer: COMMERCIAL

## 2024-02-13 DIAGNOSIS — C73 THYROID CANCER (HCC): Primary | ICD-10-CM

## 2024-02-13 DIAGNOSIS — E89.0 POSTOPERATIVE HYPOTHYROIDISM: ICD-10-CM

## 2024-02-13 PROCEDURE — 99214 OFFICE O/P EST MOD 30 MIN: CPT | Performed by: INTERNAL MEDICINE

## 2024-02-13 RX ORDER — MIRABEGRON 50 MG/1
1 TABLET, FILM COATED, EXTENDED RELEASE ORAL DAILY
COMMUNITY

## 2024-02-13 RX ORDER — LEVOTHYROXINE SODIUM 175 MCG
175 TABLET ORAL DAILY
Qty: 90 TABLET | Refills: 3 | Status: SHIPPED | OUTPATIENT
Start: 2024-02-13 | End: 2025-02-07

## 2024-02-13 NOTE — PROGRESS NOTES
TeleConsultation - Endocrine  Chel Isabel 63 y.o. female MRN: [id number]   Encounter: [id number]      REQUIRED DOCUMENTATION:     1. This service was provided via Telemedicine.  2. Provider located at [facility].  3. TeleMed provider: Shane Vazquez MD.  4. Identify all parties in room with patient during tele consult:  Patient  5. After connecting through [employer], patient was identified by name and date of birth and assistant checked wristband.  Patient was then informed that this was a Telemedicine visit and that the exam was being conducted confidentially over secure lines. My office door was closed. No one else was in the room.  Patient acknowledged consent and understanding of privacy and security of the Telemedicine visit, and gave us permission to have the assistant stay in the room in order to assist with the history and to conduct the exam.  I informed the patient that I have reviewed their record in Epic and presented the opportunity for them to ask any questions regarding the visit today.  The patient agreed to participate.  6.  [employer], an epic embedded platform, was used to perform this visit.       Assessment/Plan     Assessment:  History of thyroid cancer and postoperative hypothyroidism  Plan:  1.  History of thyroid cancer-the most recent testing did not have a thyroglobulin level which I have ordered today.  She does have positive antibodies which have remained unchanged suggesting no evidence of thyroid cancer recurrence at this time.    2.  Postoperative hypothyroidism-her target is low normal for the TSH.  The current TSH is below target.  Decrease Synthroid to 175 mcg/day.  Check TSH with reflex to free T4 in 6 weeks.    History of Present Illness     HPI: Chel Isabel is a 63 y.o. year old female who presents for follow-up of postoperative hypothyroidism and history of thyroid cancer.  She was diagnosed with thyroid cancer in 2007 which was treated with surgery as well as  radioactive iodine treatment.  There has been no evidence of recurrence.  For the hypothyroidism, she is on Synthroid 200 mcg/day.  She denies any symptoms of hypo or hyperthyroidism.      Review of Systems   Constitutional:  Negative for chills and fever.   Respiratory:  Negative for shortness of breath.    Cardiovascular:  Negative for chest pain.   Gastrointestinal:  Negative for constipation, diarrhea, nausea and vomiting.   Endocrine: Negative for cold intolerance and heat intolerance.   All other systems reviewed and are negative.      Historical Information   Past Medical History:   Diagnosis Date    Arthritis     Asthma     BRCA1 negative     BRCA2 negative     Cancer (HCC)     thyroid cancer    Colon polyp     Disease of thyroid gland     Fibroid     History of COVID-19 01/04/2022    mild cold s/s    Hyperlipidemia     Hypertension     Hypothyroidism     Postmenopausal bleeding 11/23/2021    Varicella      Past Surgical History:   Procedure Laterality Date    ADENOIDECTOMY      BREAST BIOPSY      BREAST CYST EXCISION Right 1991    Benign    BREAST LUMPECTOMY      [place] HYSTEROSCOPY BX ENDOMETRIUM&/POLYPC W/WO D&C N/A 03/07/2022    Procedure: DILATATION AND CURETTAGE (D&C) WITH HYSTEROSCOPY, polypectomy;  Surgeon: Cynthia Copeland MD;  Location: [facility] MAIN OR;  Service: Gynecology    THYROIDECTOMY      TONSILLECTOMY      VARICOSE VEIN SURGERY Bilateral      Social History   Social History     Substance and Sexual Activity   Alcohol Use Yes    Alcohol/week: 1.0 standard drink of alcohol    Types: 1 Glasses of wine per week    Comment: Only on holidays     Social History     Substance and Sexual Activity   Drug Use Never     Social History     Tobacco Use   Smoking Status Never    Passive exposure: Never   Smokeless Tobacco Never     Family History: non-contributory    Meds/Allergies   all current active meds have been reviewed  Allergies   Allergen Reactions    Avocado (Diagnostic) - Food Allergy Swelling      Tongue swells    Citrullus Vulgaris Swelling     Tongue swells    Pineapple - Food Allergy Swelling     Tongue swells    Sulfa Antibiotics Rash       Objective   Vitals: There were no vitals taken for this visit.  [unfilled]  Invasive Devices       None                   Physical Exam  Vitals reviewed.   Constitutional:       General: She is not in acute distress.     Appearance: She is well-developed. She is not diaphoretic.   HENT:      Head: Normocephalic and atraumatic.   Eyes:      General: Lids are normal.         Right eye: No discharge.         Left eye: No discharge.   Pulmonary:      Effort: Pulmonary effort is normal. No respiratory distress.   Abdominal:      General: Bowel sounds are normal. There is no distension.      Palpations: Abdomen is soft.      Tenderness: There is no abdominal tenderness.   Musculoskeletal:         General: Normal range of motion.      Cervical back: Normal range of motion.   Lymphadenopathy:      Head:      Right side of head: No occipital adenopathy.      Left side of head: No occipital adenopathy.      Upper Body:      Right upper body: No supraclavicular adenopathy.      Left upper body: No supraclavicular adenopathy.   Skin:     Coloration: Skin is not jaundiced.   Neurological:      Mental Status: She is alert and oriented to person, place, and time.   Psychiatric:         Behavior: Behavior normal.

## 2024-02-26 ENCOUNTER — OFFICE VISIT (OUTPATIENT)
Dept: OBGYN CLINIC | Facility: CLINIC | Age: 64
End: 2024-02-26
Payer: COMMERCIAL

## 2024-02-26 VITALS
DIASTOLIC BLOOD PRESSURE: 80 MMHG | HEIGHT: 66 IN | BODY MASS INDEX: 35.36 KG/M2 | WEIGHT: 220 LBS | SYSTOLIC BLOOD PRESSURE: 140 MMHG

## 2024-02-26 DIAGNOSIS — N95.0 POST-MENOPAUSAL BLEEDING: Primary | ICD-10-CM

## 2024-02-26 PROCEDURE — 99213 OFFICE O/P EST LOW 20 MIN: CPT | Performed by: OBSTETRICS & GYNECOLOGY

## 2024-02-29 NOTE — PROGRESS NOTES
Assessment/Plan:     Diagnoses and all orders for this visit:    Post-menopausal bleeding    We discussed that her endometrial biopsy was reassuring atrophic and inactive endometrium.  We also discussed with her the lack of postmenopausal bleeding it is okay for us to continue to monitor her bleeding and not undergo the surgical procedure at this time.  Recommended for patient to concentrate this urological procedure being performed as it does seem to have a higher priority than her hysterectomy at this time.  I also encourage patient to come in for her annual GYN exam which is scheduled for mid March.  We can revisit her postmenopausal bleeding and continued monitoring as well as check in on her urological procedure results of the GYN exam.  Patient is agreeable with this plan.    I have spent a total time of 25 minutes on 02/26/24 in caring for this patient including Prognosis, Risks and benefits of tx options, Patient and family education, Risk factor reductions, Impressions, Counseling / Coordination of care, Reviewing / ordering tests, medicine, procedures  , and Obtaining or reviewing history  .            Subjective:    Patient ID: Chel Isabel is a 63 y.o. female.  Chief Complaint   Patient presents with    Pre-op Exam     Pre-operative exam for R. TLH on 03/25. Pt states she no longer is having pmb and is considering not having surgery, she would like to speak with provider.     Emb -Atrophic endometrium. Negative for EIN and malignancy  Plan for robotic assisted TLH, BSO and cystoscopy for postmenopausal bleeding, recurrent.         Chel presents today for her preop history and physical lamination for her scheduled total laparoscopic hysterectomy with robotic assistance and bilateral salpingo-oophorectomy.  She was previously having recurrent postmenopausal bleeding and has undergone 2 hysteroscopy, dilation and curettage procedures for removal of endometrial polyps.  Plan was to remove the uterus and cervix  "and bilateral tubes and ovaries to reduce her risk of another surgical procedure do more definitive treatment.  Patient states that she has not had any further episodes of postmenopausal bleeding she is unsure if she would like to proceed with the surgical procedure as scheduled.  Patient also states that she is undergoing urological procedure on 3/6/2024 for some abnormal bladder cells.  She does have concern regarding this procedure as well as anxiety and is unsure if she will be okay going through 2 major procedures within a  month.         The following portions of the patient's history were reviewed and updated as appropriate: allergies, current medications, past family history, past medical history, past social history, past surgical history, and problem list.    Review of Systems   Constitutional:  Negative for chills and fever.   Eyes:  Negative for visual disturbance.   Respiratory:  Negative for cough and shortness of breath.    Cardiovascular:  Negative for chest pain.   Gastrointestinal:  Negative for abdominal pain, constipation, diarrhea, nausea and vomiting.   Genitourinary:  Negative for difficulty urinating, dyspareunia, flank pain, menstrual problem, pelvic pain, vaginal bleeding, vaginal discharge and vaginal pain.   Musculoskeletal:  Negative for back pain.   Neurological:  Negative for dizziness, weakness and headaches.         Objective:  /80 (BP Location: Left arm, Patient Position: Sitting, Cuff Size: Standard)   Ht 5' 6\" (1.676 m)   Wt 99.8 kg (220 lb)   BMI 35.51 kg/m²   Physical Exam  Constitutional:       General: She is not in acute distress.     Appearance: Normal appearance. She is obese. She is not diaphoretic.   HENT:      Head: Normocephalic and atraumatic.   Pulmonary:      Effort: Pulmonary effort is normal. No respiratory distress.   Musculoskeletal:      Right lower leg: No edema.      Left lower leg: No edema.   Neurological:      Mental Status: She is alert and " oriented to person, place, and time.   Skin:     General: Skin is warm and dry.      Coloration: Skin is not pale.   Psychiatric:         Mood and Affect: Mood normal.         Behavior: Behavior normal.         Thought Content: Thought content normal.         Judgment: Judgment normal.   Vitals and nursing note reviewed.

## 2024-03-11 ENCOUNTER — ANNUAL EXAM (OUTPATIENT)
Dept: OBGYN CLINIC | Facility: CLINIC | Age: 64
End: 2024-03-11
Payer: MEDICARE

## 2024-03-11 VITALS
BODY MASS INDEX: 35.68 KG/M2 | DIASTOLIC BLOOD PRESSURE: 84 MMHG | WEIGHT: 222 LBS | HEIGHT: 66 IN | SYSTOLIC BLOOD PRESSURE: 134 MMHG

## 2024-03-11 DIAGNOSIS — R92.333 HETEROGENEOUSLY DENSE TISSUE OF BOTH BREASTS ON MAMMOGRAPHY: ICD-10-CM

## 2024-03-11 DIAGNOSIS — Z01.419 WELL WOMAN EXAM WITH ROUTINE GYNECOLOGICAL EXAM: Primary | ICD-10-CM

## 2024-03-11 DIAGNOSIS — Z91.89 INCREASED RISK OF BREAST CANCER: ICD-10-CM

## 2024-03-11 DIAGNOSIS — Z12.31 ENCOUNTER FOR SCREENING MAMMOGRAM FOR MALIGNANT NEOPLASM OF BREAST: ICD-10-CM

## 2024-03-11 DIAGNOSIS — R93.89 THICKENED ENDOMETRIUM: ICD-10-CM

## 2024-03-11 PROCEDURE — G0101 CA SCREEN;PELVIC/BREAST EXAM: HCPCS | Performed by: OBSTETRICS & GYNECOLOGY

## 2024-03-11 NOTE — PROGRESS NOTES
ASSESSMENT & PLAN:   Diagnoses and all orders for this visit:    Well woman exam with routine gynecological exam  -     Mammo screening bilateral w 3d & cad; Future    Encounter for screening mammogram for malignant neoplasm of breast  -     Mammo screening bilateral w 3d & cad; Future    Increased risk of breast cancer  -     US breast screening bilateral complete (ABUS); Future    Heterogeneously dense tissue of both breasts on mammography  -     US breast screening bilateral complete (ABUS); Future          The following were reviewed in today's visit: ASCCP guidelines, Gardisil vaccination, STD testing breast self exam, mammography screening ordered, menopause, exercise, healthy diet, colonoscopy reviewed, and recurrent PMB.    Patient has not had another recurrence of PMB. She would like to cancel her hysterectomy at this time. If her PMB recurs or her uterine lining is thickened greater than or equal to 1cm would recommend revisiting definitive treatment. Good candidate for vaginal hysterectomy.     Patient to return to office in yearly for annual exam.     All questions have been answered to her satisfaction.        CC:  Annual Gynecologic Examination  Chief Complaint   Patient presents with    Gynecologic Exam     Chel Isabel is here for her annual exam; pap UTD, mammo ordered.  (Last pap 2023 neg pap/ neg hpv   Last mammo 11/10/2023 BIRADS 2-benign mammo. Elevated TC risk- 27.5% ABUS recommended annually.     EMB 2023 - Atrophic endometrium. Neg EIN and malignancy   colonoscopy 22 - repeat 10 years)         HPI: Chel Isabel is a 63 y.o.  who presents for annual gynecologic examination.  She has the following concerns:  none. Recently had a bladder biopsy done for abnormal tissue. Her urologist is fairly confident that the tissue is benign.     We discussed at her prior visit, her lack of recurrence of PMB and canceling her hysterectomy at this time. We reviewed that if her bleeding was to  recur or if her lining becomes abnormally thickened (>1cm), would recommend moving forward with hysterectomy.       Health Maintenance:    Exercise: infrequently  Breast exams/breast awareness: yes  Last mammogram:  - BIRADS 2, elevated risk score 27%. ABUS ordered  Colorectal cancer screenin, repeat in 10 years,     Past Medical History:   Diagnosis Date    Arthritis     Asthma     BRCA1 negative     BRCA2 negative     Cancer (HCC)     thyroid cancer    Colon polyp     Disease of thyroid gland     Fibroid     History of COVID-19 2022    mild cold s/s    Hyperlipidemia     Hypertension     Hypothyroidism     Postmenopausal bleeding 2021    Varicella        Past Surgical History:   Procedure Laterality Date    ADENOIDECTOMY      BREAST BIOPSY      BREAST CYST EXCISION Right     Benign    BREAST LUMPECTOMY      MT HYSTEROSCOPY BX ENDOMETRIUM&/POLYPC W/WO D&C N/A 2022    Procedure: DILATATION AND CURETTAGE (D&C) WITH HYSTEROSCOPY, polypectomy;  Surgeon: Cynthia Copeland MD;  Location: [facility] MAIN OR;  Service: Gynecology    THYROIDECTOMY      TONSILLECTOMY      VARICOSE VEIN SURGERY Bilateral        Past OB/Gyn History:   No LMP recorded. Patient is postmenopausal.    Menopausal status: postmenopausal  Menopausal symptoms: None    Last Pap:  : no abnormalities  History of abnormal Pap smear: no    Patient is currently sexually active.   STD testing: no  Current contraception: post menopausal status      Family History  Family History   Problem Relation Age of Onset    Breast cancer Mother 50    Heart disease Mother     Heart disease Father     Stroke Father     Kidney disease Father     Breast cancer Sister 50    Hypertension Sister     No Known Problems Daughter     No Known Problems Daughter     Diabetes unspecified Maternal Grandmother     Prostate cancer Maternal Grandfather     Diabetes Maternal Aunt     Diabetes Maternal Uncle     Diabetes Paternal Aunt     Breast cancer  Cousin 45       Family history of uterine or ovarian cancer: no  Family history of breast cancer: yes  Family history of colon cancer: no    Social History:  Social History     Socioeconomic History    Marital status: /Civil Union     Spouse name: Not on file    Number of children: Not on file    Years of education: Not on file    Highest education level: Not on file   Occupational History    Not on file   Tobacco Use    Smoking status: Never     Passive exposure: Never    Smokeless tobacco: Never   Vaping Use    Vaping status: Never Used   Substance and Sexual Activity    Alcohol use: Yes     Alcohol/week: 1.0 standard drink of alcohol     Types: 1 Glasses of wine per week     Comment: Only on holidays    Drug use: Never    Sexual activity: Yes     Partners: Male     Birth control/protection: Post-menopausal   Other Topics Concern    Not on file   Social History Narrative    Not on file     Social Determinants of Health     Financial Resource Strain: Not on file   Food Insecurity: Not on file   Transportation Needs: Not on file   Physical Activity: Not on file   Stress: Not on file   Social Connections: Not on file   Intimate Partner Violence: Not on file   Housing Stability: Not on file     Domestic violence screen: negative    Allergies:  Allergies   Allergen Reactions    Avocado (Diagnostic) - Food Allergy Swelling     Tongue swells    Citrullus Vulgaris Swelling     Tongue swells    Pineapple - Food Allergy Swelling     Tongue swells    Sulfa Antibiotics Rash       Medications:    Current Outpatient Medications:     albuterol (Ventolin HFA) 90 mcg/act inhaler, Inhale 2 puffs every 6 (six) hours as needed for wheezing, Disp: 18 g, Rfl: 2    atorvastatin (LIPITOR) 10 mg tablet, take 1 tablet by mouth once daily, Disp: 90 tablet, Rfl: 1    celecoxib (CeleBREX) 200 mg capsule, Take 1 capsule (200 mg total) by mouth 2 (two) times a day as needed for moderate pain, Disp: 60 capsule, Rfl: 0    cetirizine  "(ZyrTEC) 10 mg tablet, as needed  , Disp: , Rfl:     cimetidine (TAGAMET) 400 mg tablet, Take 1 tablet (400 mg total) by mouth daily at bedtime, Disp: 90 tablet, Rfl: 3    fluticasone (FLONASE) 50 mcg/act nasal spray, 2 sprays into each nostril as needed for rhinitis, Disp: 16 g, Rfl: 3    Myrbetriq 50 MG TB24, Take 1 tablet by mouth daily, Disp: , Rfl:     omeprazole (PriLOSEC) 40 MG capsule, Take 1 capsule (40 mg total) by mouth daily, Disp: 90 capsule, Rfl: 3    Synthroid 175 MCG tablet, Take 1 tablet (175 mcg total) by mouth daily, Disp: 90 tablet, Rfl: 3    valsartan-hydrochlorothiazide (DIOVAN-HCT) 80-12.5 MG per tablet, take 1 tablet by mouth once daily, Disp: 90 tablet, Rfl: 0    Gemtesa 75 MG TABS, Take 1 tablet by mouth daily (Patient not taking: Reported on 2/13/2024), Disp: , Rfl:     Review of Systems:  Review of Systems   Constitutional:  Negative for activity change, appetite change and unexpected weight change.   Respiratory:  Negative for cough and shortness of breath.    Cardiovascular:  Negative for chest pain.   Gastrointestinal:  Negative for abdominal pain, constipation, diarrhea, nausea and vomiting.   Genitourinary:  Negative for difficulty urinating, dyspareunia, frequency, menstrual problem, pelvic pain, urgency, vaginal bleeding, vaginal discharge and vaginal pain.   Musculoskeletal:  Negative for back pain.   Skin: Negative.    Neurological:  Negative for dizziness, weakness, light-headedness and headaches.   Psychiatric/Behavioral: Negative.           Physical Exam:  /84 (BP Location: Right arm, Patient Position: Sitting, Cuff Size: Standard)   Ht 5' 6\" (1.676 m)   Wt 101 kg (222 lb)   BMI 35.83 kg/m²    Physical Exam  Constitutional:       General: She is not in acute distress.     Appearance: Normal appearance. She is well-developed. She is obese. She is not diaphoretic.   Genitourinary:      Vulva and bladder normal.      No lesions in the vagina.      Genitourinary Comments: " Perineum normal in appearance, no lacerations, no ulcerations, no lesions visualized.      Right Labia: No rash, tenderness or lesions.     Left Labia: No tenderness, lesions or rash.     No inguinal adenopathy present in the right or left side.     No vaginal discharge, erythema, tenderness or bleeding.      Apical vaginal prolapse present.     No vaginal atrophy present.       Right Adnexa: not tender, not full and no mass present.     Left Adnexa: not tender, not full and no mass present.     No cervical motion tenderness, discharge, friability, lesion or polyp.      No parametrium nodularity or thickening present.     Uterus is prolapsed (descends to 2cm behind the hymen, mild prolapse).      Uterus is not enlarged or tender.      No uterine mass detected.     Uterus is midaxial.      No urethral prolapse or mass present.      Bladder is not tender.       Pelvic exam was performed with patient in the lithotomy position.   Rectum:      No tenderness or external hemorrhoid.   Breasts:     Breasts are symmetrical.      Right: No swelling, bleeding, mass, skin change or tenderness.      Left: No swelling, bleeding, mass, skin change or tenderness.   HENT:      Head: Normocephalic and atraumatic.   Neck:      Thyroid: No thyromegaly or thyroid tenderness.   Cardiovascular:      Rate and Rhythm: Normal rate and regular rhythm.      Heart sounds: Normal heart sounds. No murmur heard.     No friction rub.   Pulmonary:      Effort: Pulmonary effort is normal. No respiratory distress.      Breath sounds: Normal breath sounds. No wheezing or rales.   Abdominal:      Palpations: Abdomen is soft. There is no mass.      Tenderness: There is no abdominal tenderness. There is no guarding.   Musculoskeletal:         General: No tenderness. Normal range of motion.      Right lower leg: No edema.      Left lower leg: No edema.   Lymphadenopathy:      Lower Body: No right inguinal adenopathy. No left inguinal adenopathy.    Neurological:      Mental Status: She is alert and oriented to person, place, and time.   Skin:     General: Skin is warm and dry.      Coloration: Skin is not pale.      Findings: No erythema.   Psychiatric:         Mood and Affect: Mood normal.         Behavior: Behavior normal.         Thought Content: Thought content normal.         Judgment: Judgment normal.   Vitals and nursing note reviewed.

## 2024-03-26 DIAGNOSIS — I10 ESSENTIAL HYPERTENSION: ICD-10-CM

## 2024-03-26 RX ORDER — VALSARTAN AND HYDROCHLOROTHIAZIDE 80; 12.5 MG/1; MG/1
TABLET, FILM COATED ORAL
Qty: 90 TABLET | Refills: 0 | Status: SHIPPED | OUTPATIENT
Start: 2024-03-26

## 2024-04-03 ENCOUNTER — APPOINTMENT (OUTPATIENT)
Dept: LAB | Facility: CLINIC | Age: 64
End: 2024-04-03
Payer: MEDICARE

## 2024-04-03 DIAGNOSIS — R73.03 PREDIABETES: ICD-10-CM

## 2024-04-03 DIAGNOSIS — N95.0 PMB (POSTMENOPAUSAL BLEEDING): ICD-10-CM

## 2024-04-03 DIAGNOSIS — Z01.812 ENCOUNTER FOR PRE-OPERATIVE LABORATORY TESTING: ICD-10-CM

## 2024-04-03 DIAGNOSIS — C73 THYROID CANCER (HCC): ICD-10-CM

## 2024-04-03 DIAGNOSIS — E89.0 POSTOPERATIVE HYPOTHYROIDISM: ICD-10-CM

## 2024-04-03 DIAGNOSIS — E78.00 HYPERCHOLESTEREMIA: ICD-10-CM

## 2024-04-03 DIAGNOSIS — I10 ESSENTIAL HYPERTENSION: ICD-10-CM

## 2024-04-03 LAB — TSH SERPL DL<=0.05 MIU/L-ACNC: 0.56 UIU/ML (ref 0.45–4.5)

## 2024-04-03 PROCEDURE — 86800 THYROGLOBULIN ANTIBODY: CPT

## 2024-04-03 PROCEDURE — 36415 COLL VENOUS BLD VENIPUNCTURE: CPT

## 2024-04-03 PROCEDURE — 84443 ASSAY THYROID STIM HORMONE: CPT

## 2024-04-03 PROCEDURE — 84432 ASSAY OF THYROGLOBULIN: CPT

## 2024-04-12 LAB
THYROGLOB AB SERPL-ACNC: 9.1 IU/ML (ref 0–0.9)
THYROGLOB SERPL-MCNC: <2 NG/ML

## 2024-04-15 DIAGNOSIS — C73 THYROID CANCER (HCC): Primary | ICD-10-CM

## 2024-04-17 ENCOUNTER — TELEPHONE (OUTPATIENT)
Dept: ENDOCRINOLOGY | Facility: CLINIC | Age: 64
End: 2024-04-17

## 2024-04-17 NOTE — TELEPHONE ENCOUNTER
----- Message from Eddie Pearson PA-C sent at 4/15/2024  1:11 PM EDT -----  Tsh at goal.  Thyroglobuln antibodies have been generally stable over the past 2 years in the range of 7 to 9, will order ultrasound to make sure there are no abnormalities since it is trending slightly higher.   Repeat lab testig in 6 months. Will order

## 2024-04-23 ENCOUNTER — HOSPITAL ENCOUNTER (OUTPATIENT)
Dept: ULTRASOUND IMAGING | Facility: HOSPITAL | Age: 64
Discharge: HOME | End: 2024-04-23
Payer: MEDICARE

## 2024-04-23 DIAGNOSIS — C73 THYROID CANCER (HCC): ICD-10-CM

## 2024-04-23 PROCEDURE — 76536 US EXAM OF HEAD AND NECK: CPT

## 2024-04-26 DIAGNOSIS — K21.9 GASTROESOPHAGEAL REFLUX DISEASE, UNSPECIFIED WHETHER ESOPHAGITIS PRESENT: ICD-10-CM

## 2024-04-27 RX ORDER — OMEPRAZOLE 40 MG/1
40 CAPSULE, DELAYED RELEASE ORAL DAILY
Qty: 90 CAPSULE | Refills: 1 | Status: SHIPPED | OUTPATIENT
Start: 2024-04-27

## 2024-06-03 ENCOUNTER — RA CDI HCC (OUTPATIENT)
Dept: OTHER | Facility: HOSPITAL | Age: 64
End: 2024-06-03

## 2024-06-07 ENCOUNTER — OFFICE VISIT (OUTPATIENT)
Dept: FAMILY MEDICINE CLINIC | Facility: CLINIC | Age: 64
End: 2024-06-07

## 2024-06-07 VITALS
WEIGHT: 232 LBS | SYSTOLIC BLOOD PRESSURE: 122 MMHG | HEIGHT: 66 IN | DIASTOLIC BLOOD PRESSURE: 68 MMHG | TEMPERATURE: 97.6 F | OXYGEN SATURATION: 99 % | BODY MASS INDEX: 37.28 KG/M2 | HEART RATE: 81 BPM

## 2024-06-07 DIAGNOSIS — M54.50 CHRONIC BILATERAL LOW BACK PAIN WITHOUT SCIATICA: ICD-10-CM

## 2024-06-07 DIAGNOSIS — E06.3 HASHIMOTO'S THYROIDITIS: ICD-10-CM

## 2024-06-07 DIAGNOSIS — R73.01 IFG (IMPAIRED FASTING GLUCOSE): ICD-10-CM

## 2024-06-07 DIAGNOSIS — Z00.00 WELCOME TO MEDICARE PREVENTIVE VISIT: Primary | ICD-10-CM

## 2024-06-07 DIAGNOSIS — Z23 ENCOUNTER FOR IMMUNIZATION: ICD-10-CM

## 2024-06-07 DIAGNOSIS — I87.2 VENOUS INSUFFICIENCY: ICD-10-CM

## 2024-06-07 DIAGNOSIS — G89.29 CHRONIC BILATERAL LOW BACK PAIN WITHOUT SCIATICA: ICD-10-CM

## 2024-06-07 DIAGNOSIS — E66.01 OBESITY, MORBID (HCC): ICD-10-CM

## 2024-06-07 DIAGNOSIS — I10 ESSENTIAL HYPERTENSION: ICD-10-CM

## 2024-06-07 DIAGNOSIS — E78.00 HYPERCHOLESTEREMIA: ICD-10-CM

## 2024-06-07 DIAGNOSIS — M17.12 PRIMARY OSTEOARTHRITIS OF LEFT KNEE: ICD-10-CM

## 2024-06-07 DIAGNOSIS — M12.9 ARTHROPATHY, MULTIPLE SITES: ICD-10-CM

## 2024-06-07 DIAGNOSIS — Z78.0 ASYMPTOMATIC POSTMENOPAUSAL ESTROGEN DEFICIENCY: ICD-10-CM

## 2024-06-07 DIAGNOSIS — F51.01 PRIMARY INSOMNIA: ICD-10-CM

## 2024-06-07 RX ORDER — CELECOXIB 200 MG/1
200 CAPSULE ORAL 2 TIMES DAILY PRN
Qty: 60 CAPSULE | Refills: 0 | Status: SHIPPED | OUTPATIENT
Start: 2024-06-07

## 2024-06-07 RX ORDER — TRAZODONE HYDROCHLORIDE 50 MG/1
25-100 TABLET ORAL
Qty: 60 TABLET | Refills: 2 | Status: SHIPPED | OUTPATIENT
Start: 2024-06-07

## 2024-06-07 RX ORDER — TROSPIUM CHLORIDE 20 MG/1
20 TABLET, FILM COATED ORAL 2 TIMES DAILY
COMMUNITY
Start: 2024-04-29 | End: 2025-04-29

## 2024-06-07 NOTE — PROGRESS NOTES
Ambulatory Visit  Name: Chel Isabel      : 1960      MRN: [id number]  Encounter Provider: Megan Garcia MD  Encounter Date: 2024   Encounter department: [facility]    Assessment & Plan   1. Welcome to Medicare preventive visit  2. IFG (impaired fasting glucose)  -     Hemoglobin A1C; Future  -     Hemoglobin A1C  3. Asymptomatic postmenopausal estrogen deficiency  -     DXA bone density spine hip and pelvis; Future; Expected date: 2024  4. Hypercholesteremia  -     Lipid Panel with Direct LDL reflex; Future  -     Comprehensive metabolic panel; Future  -     CBC; Future; Expected date: 2024  -     Lipid Panel with Direct LDL reflex  -     Comprehensive metabolic panel  -     CBC  5. Hashimoto's thyroiditis  -     CBC; Future; Expected date: 2024  -     TSH, 3rd generation with Free T4 reflex; Future; Expected date: 2024  -     CBC  -     TSH, 3rd generation with Free T4 reflex  6. Obesity, morbid (HCC)  7. Arthropathy, multiple sites  -     celecoxib (CeleBREX) 200 mg capsule; Take 1 capsule (200 mg total) by mouth 2 (two) times a day as needed for moderate pain  8. Venous insufficiency  -     VAS Lower extremity venous insufficiency duplex, bilateral w/ measurements; Future; Expected date: 2024  -     Ambulatory Referral to Vascular Surgery; Future  9. Primary osteoarthritis of left knee  -     Ambulatory Referral to Orthopedic Surgery; Future  10. Chronic bilateral low back pain without sciatica  -     Ambulatory Referral to Physical Therapy; Future  11. Primary insomnia  -     traZODone (DESYREL) 50 mg tablet; Take 0.5-2 tablets ( mg total) by mouth daily at bedtime as needed for sleep  12. Encounter for immunization  -     Pneumococcal Conjugate Vaccine 20-valent (Pcv20)  13. Essential hypertension  Assessment & Plan:  Chronic, stable  Continue medication as prescribed       Depression Screening and Follow-up Plan: Patient was  screened for depression during today's encounter. They screened negative with a PHQ-2 score of 0.      Preventive health issues were discussed with patient, and age appropriate screening tests were ordered as noted in patient's After Visit Summary. Personalized health advice and appropriate referrals for health education or preventive services given if needed, as noted in patient's After Visit Summary.    History of Present Illness     She's struggling with her sleep. She struggles to both fall asleep and stay asleep.     She reports pain from her shins down into her feet. Her feet feel like they're burning at night. She had this before but had venous surgery with improvement.    Her back pain is ongoing. Left lower back is the worst. Pain does not radiate. She's doing PT previously in 2020.     Her arthritis is flaring in hands.        Patient Care Team:  Megan Garcia MD as PCP - General (Family Medicine)  Shane Vazquez MD as PCP - Endocrinology (Endocrinology)  Cynthia Henning-Luna Copeland MD (Obstetrics and Gynecology)    Review of Systems   Constitutional:  Negative for activity change, chills and fever.   HENT:  Negative for congestion, rhinorrhea and sore throat.    Eyes:  Negative for visual disturbance.   Respiratory:  Negative for cough, shortness of breath and wheezing.    Cardiovascular:  Negative for chest pain and palpitations.   Gastrointestinal:  Negative for abdominal pain, blood in stool, constipation, diarrhea, nausea and vomiting.   Genitourinary:  Negative for dysuria.   Musculoskeletal:  Positive for arthralgias and myalgias.   Skin:  Positive for color change. Negative for rash.   Neurological:  Negative for dizziness, weakness and headaches.   Psychiatric/Behavioral:  Positive for sleep disturbance.    All other systems reviewed and are negative.    Medical History Reviewed by provider this encounter:  Tobacco  Allergies  Meds  Problems  Med Hx  Surg Hx  Fam Hx       Annual  Wellness Visit Questionnaire   Chel is here for her Welcome to Medicare visit.     Health Risk Assessment:   Patient rates overall health as fair. Patient feels that their physical health rating is slightly worse. Patient is satisfied with their life. Eyesight was rated as same. Hearing was rated as slightly worse. Patient feels that their emotional and mental health rating is slightly worse. Patients states they are never, rarely angry. Patient states they are often unusually tired/fatigued. Pain experienced in the last 7 days has been some. Patient's pain rating has been 8/10. Patient states that she has experienced weight loss or gain in last 6 months.     Depression Screening:   PHQ-2 Score: 0      Fall Risk Screening:   In the past year, patient has experienced: no history of falling in past year      Urinary Incontinence Screening:   Patient has leaked urine accidently in the last six months.     Home Safety:  Patient does not have trouble with stairs inside or outside of their home. Patient has working smoke alarms and has no working carbon monoxide detector. Home safety hazards include: none.     Nutrition:   Current diet is Unhealthy.     Medications:   Patient is not currently taking any over-the-counter supplements. Patient is able to manage medications.     Activities of Daily Living (ADLs)/Instrumental Activities of Daily Living (IADLs):   Walk and transfer into and out of bed and chair?: Yes  Dress and groom yourself?: Yes    Bathe or shower yourself?: Yes    Feed yourself? Yes  Do your laundry/housekeeping?: Yes  Manage your money, pay your bills and track your expenses?: Yes  Make your own meals?: Yes    Do your own shopping?: Yes    Previous Hospitalizations:   Any hospitalizations or ED visits within the last 12 months?: No      Advance Care Planning:   Living will: No    Durable POA for healthcare: No    Advanced directive: No      Cognitive Screening:   Provider or family/friend/caregiver  concerned regarding cognition?: No    PREVENTIVE SCREENINGS      Cardiovascular Screening:    General: Screening Not Indicated and History Lipid Disorder      Diabetes Screening:     General: Screening Current      Colorectal Cancer Screening:     General: Screening Current      Breast Cancer Screening:     General: Screening Current      Cervical Cancer Screening:    General: Screening Not Indicated      Osteoporosis Screening:    General: Risks and Benefits Discussed    Due for: DXA Axial      Lung Cancer Screening:     General: Screening Not Indicated      Hepatitis C Screening:    General: Screening Current    Screening, Brief Intervention, and Referral to Treatment (SBIRT)    Screening  Typical number of drinks in a day: 0  Typical number of drinks in a week: 0  Interpretation: Low risk drinking behavior.    AUDIT-C Screenin) How often did you have a drink containing alcohol in the past year? never  2) How many drinks did you have on a typical day when you were drinking in the past year? 0  3) How often did you have 6 or more drinks on one occasion in the past year? never    AUDIT-C Score: 0  Interpretation: Score 0-2 (female): Negative screen for alcohol misuse    Single Item Drug Screening:  How often have you used an illegal drug (including marijuana) or a prescription medication for non-medical reasons in the past year? never    Single Item Drug Screen Score: 0  Interpretation: Negative screen for possible drug use disorder    Brief Intervention  Alcohol & drug use screenings were reviewed. No concerns regarding substance use disorder identified.     Social Determinants of Health     Food Insecurity: No Food Insecurity (2024)    Hunger Vital Sign    • Worried About Running Out of Food in the Last Year: Never true    • Ran Out of Food in the Last Year: Never true   Transportation Needs: No Transportation Needs (2024)    PRAPARE - Transportation    • Lack of Transportation (Medical): No    • Lack  "of Transportation (Non-Medical): No   Housing Stability: Low Risk  (6/7/2024)    Housing Stability Vital Sign    • Unable to Pay for Housing in the Last Year: No    • Number of Times Moved in the Last Year: 1    • Homeless in the Last Year: No   Utilities: Not At Risk (6/7/2024)    [facility] Utilities    • Threatened with loss of utilities: No     Vision Screening    Right eye Left eye Both eyes   Without correction      With correction 20/20 20/15 20/15       Objective     /68 (BP Location: Right arm, Patient Position: Sitting, Cuff Size: Large)   Pulse 81   Temp 97.6 °F (36.4 °C)   Ht 5' 6\" (1.676 m)   Wt 105 kg (232 lb)   SpO2 99%   BMI 37.45 kg/m²     Physical Exam  Vitals and nursing note reviewed.   Constitutional:       General: She is not in acute distress.     Appearance: She is well-developed. She is obese. She is not ill-appearing.   HENT:      Head: Normocephalic and atraumatic.      Right Ear: Tympanic membrane, ear canal and external ear normal. No middle ear effusion.      Left Ear: Tympanic membrane, ear canal and external ear normal.  No middle ear effusion.      Nose: Nose normal. No congestion or rhinorrhea.      Mouth/Throat:      Lips: Pink.      Mouth: Mucous membranes are moist.      Pharynx: Oropharynx is clear. Uvula midline. No oropharyngeal exudate.      Tonsils: No tonsillar exudate.   Eyes:      General: Lids are normal.      Extraocular Movements: Extraocular movements intact.      Conjunctiva/sclera: Conjunctivae normal.      Pupils: Pupils are equal, round, and reactive to light.   Neck:      Thyroid: No thyromegaly.      Trachea: No tracheal deviation.   Cardiovascular:      Rate and Rhythm: Normal rate and regular rhythm.      Pulses: Normal pulses.      Heart sounds: Normal heart sounds, S1 normal and S2 normal. No murmur heard.  Pulmonary:      Effort: Pulmonary effort is normal. No respiratory distress.      Breath sounds: Normal breath sounds. No decreased breath sounds, " wheezing, rhonchi or rales.   Abdominal:      General: Bowel sounds are normal. There is no distension.      Palpations: Abdomen is soft.      Tenderness: There is no abdominal tenderness.   Musculoskeletal:      Right lower leg: No edema.      Left lower leg: No edema.   Lymphadenopathy:      Cervical: No cervical adenopathy.   Skin:     General: Skin is warm and dry.      Capillary Refill: Capillary refill takes less than 2 seconds.   Neurological:      Mental Status: She is alert and oriented to person, place, and time.      Deep Tendon Reflexes: Reflexes normal.      Reflex Scores:       Patellar reflexes are 2+ on the right side and 2+ on the left side.  Psychiatric:         Attention and Perception: Attention normal.         Mood and Affect: Mood normal.         Thought Content: Thought content does not include suicidal ideation.       Administrative Statements

## 2024-06-07 NOTE — PATIENT INSTRUCTIONS
Medicare Preventive Visit Patient Instructions  Thank you for completing your Welcome to Medicare Visit or Medicare Annual Wellness Visit today. Your next wellness visit will be due in one year (6/8/2025).  The screening/preventive services that you may require over the next 5-10 years are detailed below. Some tests may not apply to you based off risk factors and/or age. Screening tests ordered at today's visit but not completed yet may show as past due. Also, please note that scanned in results may not display below.  Preventive Screenings:  Service Recommendations Previous Testing/Comments   Colorectal Cancer Screening  * Colonoscopy    * Fecal Occult Blood Test (FOBT)/Fecal Immunochemical Test (FIT)  * Fecal DNA/Cologuard Test  * Flexible Sigmoidoscopy Age: 45-75 years old   Colonoscopy: every 10 years (may be performed more frequently if at higher risk)  OR  FOBT/FIT: every 1 year  OR  Cologuard: every 3 years  OR  Sigmoidoscopy: every 5 years  Screening may be recommended earlier than age 45 if at higher risk for colorectal cancer. Also, an individualized decision between you and your healthcare provider will decide whether screening between the ages of 76-85 would be appropriate. Colonoscopy: 08/24/2022  FOBT/FIT: Not on file  Cologuard: Not on file  Sigmoidoscopy: Not on file    Screening Current     Breast Cancer Screening Age: 40+ years old  Frequency: every 1-2 years  Not required if history of left and right mastectomy Mammogram: 11/10/2023    Screening Current   Cervical Cancer Screening Between the ages of 21-29, pap smear recommended once every 3 years.   Between the ages of 30-65, can perform pap smear with HPV co-testing every 5 years.   Recommendations may differ for women with a history of total hysterectomy, cervical cancer, or abnormal pap smears in past. Pap Smear: 03/11/2024    Screening Not Indicated   Hepatitis C Screening Once for adults born between 1945 and 1965  More frequently in  patients at high risk for Hepatitis C Hep C Antibody: 04/06/2023    Screening Current   Diabetes Screening 1-2 times per year if you're at risk for diabetes or have pre-diabetes Fasting glucose: No results in last 5 years (No results in last 5 years)  A1C: 5.7 % (11/11/2023)  Screening Current   Cholesterol Screening Once every 5 years if you don't have a lipid disorder. May order more often based on risk factors. Lipid panel: 11/11/2023    Screening Not Indicated  History Lipid Disorder     Other Preventive Screenings Covered by Medicare:  Abdominal Aortic Aneurysm (AAA) Screening: covered once if your at risk. You're considered to be at risk if you have a family history of AAA.  Lung Cancer Screening: covers low dose CT scan once per year if you meet all of the following conditions: (1) Age 55-77; (2) No signs or symptoms of lung cancer; (3) Current smoker or have quit smoking within the last 15 years; (4) You have a tobacco smoking history of at least 20 pack years (packs per day multiplied by number of years you smoked); (5) You get a written order from a healthcare provider.  Glaucoma Screening: covered annually if you're considered high risk: (1) You have diabetes OR (2) Family history of glaucoma OR (3)  aged 50 and older OR (4)  American aged 65 and older  Osteoporosis Screening: covered every 2 years if you meet one of the following conditions: (1) You're estrogen deficient and at risk for osteoporosis based off medical history and other findings; (2) Have a vertebral abnormality; (3) On glucocorticoid therapy for more than 3 months; (4) Have primary hyperparathyroidism; (5) On osteoporosis medications and need to assess response to drug therapy.   Last bone density test (DXA Scan): Not on file.  HIV Screening: covered annually if you're between the age of 15-65. Also covered annually if you are younger than 15 and older than 65 with risk factors for HIV infection. For pregnant  patients, it is covered up to 3 times per pregnancy.    Immunizations:  Immunization Recommendations   Influenza Vaccine Annual influenza vaccination during flu season is recommended for all persons aged >= 6 months who do not have contraindications   Pneumococcal Vaccine   * Pneumococcal conjugate vaccine = PCV13 (Prevnar 13), PCV15 (Vaxneuvance), PCV20 (Prevnar 20)  * Pneumococcal polysaccharide vaccine = PPSV23 (Pneumovax) Adults 19-65 yo with certain risk factors or if 65+ yo  If never received any pneumonia vaccine: recommend Prevnar 20 (PCV20)  Give PCV20 if previously received 1 dose of PCV13 or PPSV23   Hepatitis B Vaccine 3 dose series if at intermediate or high risk (ex: diabetes, end stage renal disease, liver disease)   Respiratory syncytial virus (RSV) Vaccine - COVERED BY MEDICARE PART D  * RSVPreF3 (Arexvy) CDC recommends that adults 60 years of age and older may receive a single dose of RSV vaccine using shared clinical decision-making (SCDM)   Tetanus (Td) Vaccine - COST NOT COVERED BY MEDICARE PART B Following completion of primary series, a booster dose should be given every 10 years to maintain immunity against tetanus. Td may also be given as tetanus wound prophylaxis.   Tdap Vaccine - COST NOT COVERED BY MEDICARE PART B Recommended at least once for all adults. For pregnant patients, recommended with each pregnancy.   Shingles Vaccine (Shingrix) - COST NOT COVERED BY MEDICARE PART B  2 shot series recommended in those 19 years and older who have or will have weakened immune systems or those 50 years and older     Health Maintenance Due:      Topic Date Due   • HIV Screening  Never done   • Breast Cancer Screening: Mammogram  11/10/2024   • Colorectal Cancer Screening  08/21/2032   • Hepatitis C Screening  Completed     Immunizations Due:      Topic Date Due   • COVID-19 Vaccine (1 - 2023-24 season) Never done   • Pneumococcal Vaccine: 65+ Years (1 of 1 - PCV) Never done     Advance Directives    What are advance directives?  Advance directives are legal documents that state your wishes and plans for medical care. These plans are made ahead of time in case you lose your ability to make decisions for yourself. Advance directives can apply to any medical decision, such as the treatments you want, and if you want to donate organs.   What are the types of advance directives?  There are many types of advance directives, and each state has rules about how to use them. You may choose a combination of any of the following:  Living will:  This is a written record of the treatment you want. You can also choose which treatments you do not want, which to limit, and which to stop at a certain time. This includes surgery, medicine, IV fluid, and tube feedings.   Durable power of  for healthcare (DPAHC):  This is a written record that states who you want to make healthcare choices for you when you are unable to make them for yourself. This person, called a proxy, is usually a family member or a friend. You may choose more than 1 proxy.  Do not resuscitate (DNR) order:  A DNR order is used in case your heart stops beating or you stop breathing. It is a request not to have certain forms of treatment, such as CPR. A DNR order may be included in other types of advance directives.  Medical directive:  This covers the care that you want if you are in a coma, near death, or unable to make decisions for yourself. You can list the treatments you want for each condition. Treatment may include pain medicine, surgery, blood transfusions, dialysis, IV or tube feedings, and a ventilator (breathing machine).  Values history:  This document has questions about your views, beliefs, and how you feel and think about life. This information can help others choose the care that you would choose.  Why are advance directives important?  An advance directive helps you control your care. Although spoken wishes may be used, it is better to  have your wishes written down. Spoken wishes can be misunderstood, or not followed. Treatments may be given even if you do not want them. An advance directive may make it easier for your family to make difficult choices about your care.   Urinary Incontinence   Urinary incontinence (UI)  is when you lose control of your bladder. UI develops because your bladder cannot store or empty urine properly. The 3 most common types of UI are stress incontinence, urge incontinence, or both.  Medicines:   May be given to help strengthen your bladder control. Report any side effects of medication to your healthcare provider.  Do pelvic muscle exercises often:  Your pelvic muscles help you stop urinating. Squeeze these muscles tight for 5 seconds, then relax for 5 seconds. Gradually work up to squeezing for 10 seconds. Do 3 sets of 15 repetitions a day, or as directed. This will help strengthen your pelvic muscles and improve bladder control.  Train your bladder:  Go to the bathroom at set times, such as every 2 hours, even if you do not feel the urge to go. You can also try to hold your urine when you feel the urge to go. For example, hold your urine for 5 minutes when you feel the urge to go. As that becomes easier, hold your urine for 10 minutes.   Self-care:   Keep a UI record.  Write down how often you leak urine and how much you leak. Make a note of what you were doing when you leaked urine.  Drink liquids as directed. You may need to limit the amount of liquid you drink to help control your urine leakage. Do not drink any liquid right before you go to bed. Limit or do not have drinks that contain caffeine or alcohol.   Prevent constipation.  Eat a variety of high-fiber foods. Good examples are high-fiber cereals, beans, vegetables, and whole-grain breads. Walking is the best way to trigger your intestines to have a bowel movement.  Exercise regularly and maintain a healthy weight.  Weight loss and exercise will decrease  pressure on your bladder and help you control your leakage.   Use a catheter as directed  to help empty your bladder. A catheter is a tiny, plastic tube that is put into your bladder to drain your urine.   Go to behavior therapy as directed.  Behavior therapy may be used to help you learn to control your urge to urinate.    Weight Management   Why it is important to manage your weight:  Being overweight increases your risk of health conditions such as heart disease, high blood pressure, type 2 diabetes, and certain types of cancer. It can also increase your risk for osteoarthritis, sleep apnea, and other respiratory problems. Aim for a slow, steady weight loss. Even a small amount of weight loss can lower your risk of health problems.  How to lose weight safely:  A safe and healthy way to lose weight is to eat fewer calories and get regular exercise. You can lose up about 1 pound a week by decreasing the number of calories you eat by 500 calories each day.   Healthy meal plan for weight management:  A healthy meal plan includes a variety of foods, contains fewer calories, and helps you stay healthy. A healthy meal plan includes the following:  Eat whole-grain foods more often.  A healthy meal plan should contain fiber. Fiber is the part of grains, fruits, and vegetables that is not broken down by your body. Whole-grain foods are healthy and provide extra fiber in your diet. Some examples of whole-grain foods are whole-wheat breads and pastas, oatmeal, brown rice, and bulgur.  Eat a variety of vegetables every day.  Include dark, leafy greens such as spinach, kale, vicente greens, and mustard greens. Eat yellow and orange vegetables such as carrots, sweet potatoes, and winter squash.   Eat a variety of fruits every day.  Choose fresh or canned fruit (canned in its own juice or light syrup) instead of juice. Fruit juice has very little or no fiber.  Eat low-fat dairy foods.  Drink fat-free (skim) milk or 1% milk. Eat  fat-free yogurt and low-fat cottage cheese. Try low-fat cheeses such as mozzarella and other reduced-fat cheeses.  Choose meat and other protein foods that are low in fat.  Choose beans or other legumes such as split peas or lentils. Choose fish, skinless poultry (chicken or turkey), or lean cuts of red meat (beef or pork). Before you cook meat or poultry, cut off any visible fat.   Use less fat and oil.  Try baking foods instead of frying them. Add less fat, such as margarine, sour cream, regular salad dressing and mayonnaise to foods. Eat fewer high-fat foods. Some examples of high-fat foods include french fries, doughnuts, ice cream, and cakes.  Eat fewer sweets.  Limit foods and drinks that are high in sugar. This includes candy, cookies, regular soda, and sweetened drinks.  Exercise:  Exercise at least 30 minutes per day on most days of the week. Some examples of exercise include walking, biking, dancing, and swimming. You can also fit in more physical activity by taking the stairs instead of the elevator or parking farther away from stores. Ask your healthcare provider about the best exercise plan for you.      © Copyright [employer] 2018 Information is for End User's use only and may not be sold, redistributed or otherwise used for commercial purposes. All illustrations and images included in CareNotes® are the copyrighted property of [employer] or [employer]

## 2024-06-25 DIAGNOSIS — I10 ESSENTIAL HYPERTENSION: ICD-10-CM

## 2024-06-25 DIAGNOSIS — E78.00 HYPERCHOLESTEREMIA: ICD-10-CM

## 2024-06-25 RX ORDER — ATORVASTATIN CALCIUM 10 MG/1
TABLET, FILM COATED ORAL
Qty: 90 TABLET | Refills: 1 | Status: SHIPPED | OUTPATIENT
Start: 2024-06-25

## 2024-06-25 RX ORDER — VALSARTAN AND HYDROCHLOROTHIAZIDE 80; 12.5 MG/1; MG/1
TABLET, FILM COATED ORAL
Qty: 90 TABLET | Refills: 1 | Status: SHIPPED | OUTPATIENT
Start: 2024-06-25

## 2024-06-30 DIAGNOSIS — M12.9 ARTHROPATHY, MULTIPLE SITES: ICD-10-CM

## 2024-06-30 RX ORDER — CELECOXIB 200 MG/1
CAPSULE ORAL
Qty: 60 CAPSULE | Refills: 0 | Status: SHIPPED | OUTPATIENT
Start: 2024-06-30

## 2024-07-09 ENCOUNTER — OFFICE VISIT (OUTPATIENT)
Dept: OBGYN CLINIC | Facility: CLINIC | Age: 64
End: 2024-07-09
Payer: MEDICARE

## 2024-07-09 VITALS — HEART RATE: 81 BPM | DIASTOLIC BLOOD PRESSURE: 79 MMHG | SYSTOLIC BLOOD PRESSURE: 150 MMHG

## 2024-07-09 DIAGNOSIS — M25.562 CHRONIC PAIN OF BOTH KNEES: Primary | ICD-10-CM

## 2024-07-09 DIAGNOSIS — M25.561 CHRONIC PAIN OF BOTH KNEES: Primary | ICD-10-CM

## 2024-07-09 DIAGNOSIS — M17.12 PRIMARY OSTEOARTHRITIS OF LEFT KNEE: ICD-10-CM

## 2024-07-09 DIAGNOSIS — G89.29 CHRONIC PAIN OF BOTH KNEES: Primary | ICD-10-CM

## 2024-07-09 PROCEDURE — 99213 OFFICE O/P EST LOW 20 MIN: CPT | Performed by: PHYSICAL MEDICINE & REHABILITATION

## 2024-07-09 PROCEDURE — 20610 DRAIN/INJ JOINT/BURSA W/O US: CPT | Performed by: PHYSICAL MEDICINE & REHABILITATION

## 2024-07-09 RX ORDER — TRIAMCINOLONE ACETONIDE 40 MG/ML
80 INJECTION, SUSPENSION INTRA-ARTICULAR; INTRAMUSCULAR
Status: COMPLETED | OUTPATIENT
Start: 2024-07-09 | End: 2024-07-09

## 2024-07-09 RX ORDER — ROPIVACAINE HYDROCHLORIDE 5 MG/ML
8 INJECTION, SOLUTION EPIDURAL; INFILTRATION; PERINEURAL
Status: COMPLETED | OUTPATIENT
Start: 2024-07-09 | End: 2024-07-09

## 2024-07-09 RX ADMIN — TRIAMCINOLONE ACETONIDE 80 MG: 40 INJECTION, SUSPENSION INTRA-ARTICULAR; INTRAMUSCULAR at 15:30

## 2024-07-09 RX ADMIN — ROPIVACAINE HYDROCHLORIDE 8 ML: 5 INJECTION, SOLUTION EPIDURAL; INFILTRATION; PERINEURAL at 15:30

## 2024-07-09 NOTE — PROGRESS NOTES
1. Chronic pain of both knees  Large joint arthrocentesis: bilateral knee      2. Primary osteoarthritis of left knee  Ambulatory Referral to Orthopedic Surgery    Large joint arthrocentesis: bilateral knee        Orders Placed This Encounter   Procedures    Large joint arthrocentesis: bilateral knee        Impression:  Patient is here in follow up of bilateral hand pain likely secondary to CMC osteoarthritis.  Treatment has included right first CMC steroid injection, Voltaren gel and comfort cool gloves.  Continue with Voltaren and comfort cool.  Will have her scheduled for bilateral ultrasound-guided first CMC joint steroid injections.     Patient also presents with bilateral knee pain likely secondary to osteoarthritis.  Treatment has included bilateral knee steroid injection. Today we proceeded with bilateral knee steroid injections. She is currently using celecoxib.     Imaging Studies (I personally reviewed images in PACS and report):  Right hand x-rays most recent to this encounter reviewed.  These images show severe osteoarthritis of the CMC joint.     Right knee x-rays most recent to this encounter reviewed.  These images show tricompartmental osteoarthritis is a small joint effusion.  These findings are consistent with Kellgren Efrain grade 2 osteoarthritis.  There is also a sesamoid bone consistent with a fabella.    Left hand x-rays show severe 1st CMC joint OA.  Scattered and severe IP joint osteoarthritis. No acute osseous abnormalities.     Left knee x-rays show medial joint space and patellofemoral joint space narrowing with osteophytes.  Likely intraarticular loose body in the suprapatellar pouch.  This is consistent with Kellgren Efrain grade 3 OA.    No follow-ups on file.    Patient is in agreement with the above plan.    HPI:  Chel Isabel is a 64 y.o. female  who presents in follow up.  Here for   Chief Complaint   Patient presents with    Right Knee - Pain, Follow-up    Left Knee - Pain,  Follow-up       Since last visit: See above.    Following history reviewed and updated:  Past Medical History:   Diagnosis Date    Arthritis     Asthma     BRCA1 negative     BRCA2 negative     Cancer (HCC)     thyroid cancer    Colon polyp     Disease of thyroid gland     Fibroid     History of COVID-19 01/04/2022    mild cold s/s    Hyperlipidemia     Hypertension     Hypothyroidism     Postmenopausal bleeding 11/23/2021    Varicella      Past Surgical History:   Procedure Laterality Date    ADENOIDECTOMY      BREAST BIOPSY      BREAST CYST EXCISION Right 1991    Benign    BREAST LUMPECTOMY      MN HYSTEROSCOPY BX ENDOMETRIUM&/POLYPC W/WO D&C N/A 03/07/2022    Procedure: DILATATION AND CURETTAGE (D&C) WITH HYSTEROSCOPY, polypectomy;  Surgeon: Cynthia Copeland MD;  Location: [facility] MAIN OR;  Service: Gynecology    THYROIDECTOMY      TONSILLECTOMY      VARICOSE VEIN SURGERY Bilateral      Social History   Social History     Substance and Sexual Activity   Alcohol Use Yes    Alcohol/week: 1.0 standard drink of alcohol    Types: 1 Glasses of wine per week    Comment: Only on holidays     Social History     Substance and Sexual Activity   Drug Use Never     Social History     Tobacco Use   Smoking Status Never    Passive exposure: Never   Smokeless Tobacco Never     Family History   Problem Relation Age of Onset    Breast cancer Mother 50    Heart disease Mother     Heart disease Father     Stroke Father     Kidney disease Father     Breast cancer Sister 50    Hypertension Sister     No Known Problems Daughter     No Known Problems Daughter     Diabetes unspecified Maternal Grandmother     Prostate cancer Maternal Grandfather     Diabetes Maternal Aunt     Diabetes Maternal Uncle     Diabetes Paternal Aunt     Breast cancer Cousin 45     Allergies   Allergen Reactions    Avocado (Diagnostic) - Food Allergy Swelling     Tongue swells    Citrullus Vulgaris Swelling     Tongue swells    Pineapple - Food Allergy Swelling      Tongue swells    Sulfa Antibiotics Rash        Constitutional:  /79   Pulse 81    General: NAD.  Eyes: Clear sclerae.  ENT: No inflammation, lesion, or mass of lips.  No tracheal deviation.  Musculoskeletal: As mentioned below.  Integumentary: No visible rashes or skin lesions.  Pulmonary/Chest: Effort normal. No respiratory distress.   Neuro: CN's grossly intact, MASON.  Psych: Normal affect and judgement.  Vascular: WWP.    Right Knee Exam     Tenderness   The patient is experiencing tenderness in the medial joint line.    Range of Motion   Extension:  normal     Tests   Varus: negative Valgus: negative  Patellar apprehension: negative    Other   Erythema: absent  Scars: absent  Sensation: normal  Pulse: present  Swelling: none  Effusion: no effusion present    Comments:  No referred pain into knee with passive hip internal rotation.      Left Knee Exam     Tenderness   The patient is experiencing tenderness in the medial joint line.    Range of Motion   Extension:  normal     Tests   Varus: negative Valgus: negative  Patellar apprehension: negative    Other   Erythema: absent  Scars: absent  Sensation: normal  Pulse: present  Swelling: none  Effusion: no effusion present    Comments:  No referred pain into knee with passive hip internal rotation.             Large joint arthrocentesis: bilateral knee  Universal Protocol:  Consent: Verbal consent obtained. Written consent not obtained.  Risks and benefits: risks, benefits and alternatives were discussed  Consent given by: patient  Timeout called at: 7/9/2024 3:30 PM.  Patient understanding: patient states understanding of the procedure being performed  Patient consent: the patient's understanding of the procedure matches consent given  Site marked: the operative site was marked  Radiology Images displayed and confirmed. If images not available, report reviewed: imaging studies available  Patient identity confirmed: verbally with patient  Supporting  Documentation  Indications: pain   Procedure Details  Location: knee - bilateral knee  Needle size: 22 G  Ultrasound guidance: no  Approach: Inferolateral to patella.    Medications (Right): 8 mL ropivacaine 0.5 %; 80 mg triamcinolone acetonide 40 mg/mLMedications (Left): 8 mL ropivacaine 0.5 %; 80 mg triamcinolone acetonide 40 mg/mL   Patient tolerance: patient tolerated the procedure well with no immediate complications  Dressing:  Sterile dressing applied    There was little to no resistance encountered during the injection.    Risks of this procedure include:    - Risk of bleeding since a needle is involved.  - Risk of infection (1/10,000 chance as per recent studies).  Signs/symptoms were discussed and they would prompt an urgent evaluation at an emergency department.  - Risk of pigmentation or skin dimpling in the skin (2-3% chance as per recent studies) from the steroid.  - Risk of increased pain from steroid flare (1% chance as per recent studies) that typically lasts 24-48 hours.  - Risk of increased blood sugars from the steroid medication that can last for a few weeks.  If the patient is a diabetic or pre-diabetic, they were encouraged to closely monitor their blood sugars and discuss with PCP if elevated more than usual or if having symptoms.    The benefits outweigh the risks and so the procedure was completed.

## 2024-07-11 ENCOUNTER — EVALUATION (OUTPATIENT)
Dept: PHYSICAL THERAPY | Facility: CLINIC | Age: 64
End: 2024-07-11
Payer: MEDICARE

## 2024-07-11 DIAGNOSIS — G89.29 CHRONIC BILATERAL LOW BACK PAIN WITHOUT SCIATICA: Primary | ICD-10-CM

## 2024-07-11 DIAGNOSIS — M54.50 CHRONIC BILATERAL LOW BACK PAIN WITHOUT SCIATICA: Primary | ICD-10-CM

## 2024-07-11 PROCEDURE — 97110 THERAPEUTIC EXERCISES: CPT | Performed by: PHYSICAL THERAPIST

## 2024-07-11 PROCEDURE — 97161 PT EVAL LOW COMPLEX 20 MIN: CPT | Performed by: PHYSICAL THERAPIST

## 2024-07-11 NOTE — PROGRESS NOTES
PT Evaluation     Today's date: 2024  Patient name: Chel Isabel  : 1960  MRN: [id number]  Referring provider: Hema Garcia*  Dx:   Encounter Diagnosis     ICD-10-CM    1. Chronic bilateral low back pain without sciatica  M54.50     G89.29                      Assessment  Impairments: abnormal muscle firing, abnormal or restricted ROM, activity intolerance, pain with function and poor body mechanics  Symptom irritability: moderate    Assessment details: Chel Isabel is a 64 y.o. female who presents with signs and symptoms consistent of chronic low back pain. Patient presents with pain, decreased strength, and decreased joint mobility. Due to these impairments, patient has difficulty performing a/iadls, recreational activities, and engaging in social activities. Patient would benefit from skilled physical therapy to address the impairments, improve their level of function, and to improve their overall quality of life.    Understanding of Dx/Px/POC: good     Prognosis: good  Prognosis details: Positive prognostic indicators include: absence of observed red flags, positive attitude towards recovery, good understanding of diagnosis and treatment plan   Negative prognostic indicators include: none    Goals  STG: To be achieved in 4 -6 weeks  1)Able to get half-way through grocery shopping without the cart    2)Worst pain < 6/10  3)No pain with sitting   4)Improve strength in lower extremity by 1/2 MMT    LTG: To be achieved at Discharge  1)Patient is independent with HEP  2)Return to pre-morbid work related activities  3)Improve tolerance to prolonged sitting, standing, and walking to prior level of function    Plan  Patient would benefit from: skilled physical therapy    Planned therapy interventions: manual therapy, neuromuscular re-education, patient education, therapeutic activities and therapeutic exercise    Frequency: 2x/week for 4-6 weeks.  Treatment plan discussed with: patient    Subjective  Evaluation    History of Present Illness  Mechanism of injury: History: Pt reports recent weight gain in the last 6 months after stopping a medication and her back has been getting worse as well. She has to lean on a grocery cart if she goes grocery shopping. She had sciatica in 2019 but hasn't had an episode since.     Aggravating: sitting, walking, household ADLs   Alleviating: hasn't tried anything (takes Celebrex for her hands which sometimes helps her back)   Sleeping: she has trouble sleeping as well.   Social support: retired   Hobbies/occupation: lives with . Has three kids    Imaging: none recent   Red flags: Chel denies a new onset of Bladder incontinence, Bowel dysfunction, Dizziness, Dysphagia, Tingling, Numbness, and Saddle anesthesia     Patient Goals  Patient goals for therapy: decreased pain and independence with ADLs/IADLs    Pain  Current pain rating: 3  At best pain ratin  At worst pain rating: 10  Location: bilateral low low back  Quality: dull ache and burning      Objective     Active Range of Motion     Lumbar   Flexion: Active lumbar flexion: top of shoes.  WFL  Extension:  WFL  Left lateral flexion:  WFL  Right lateral flexion:  WFL  Left rotation:  WFL  Right rotation:  WFL    Additional Active Range of Motion Details  Felt relief after all AROM     Joint Play     Hypomobile: L1, L2, L3, L4 and L5     Strength/Myotome Testing     Lumbar   Left   Heel walk: normal  Toe walk: normal    Right   Heel walk: normal  Toe walk: normal    Left Hip   Planes of Motion   Flexion: 4+  Abduction: 4-  External rotation: 4+  Internal rotation: 4+    Right Hip   Planes of Motion   Flexion: 4+  Abduction: 4-  External rotation: 4+  Internal rotation: 4+    Left Knee   Flexion: 5  Extension: 5    Right Knee   Flexion: 5  Extension: 5    Left Ankle/Foot   Dorsiflexion: 5  Plantar flexion: 5    Right Ankle/Foot   Dorsiflexion: 5  Plantar flexion: 5    Additional Strength Details  SLR 10 ea side but  uncomfortable in the back on the L leg     General Comments:      Lumbar Comments  SLS L 10 s R 4 s              Precautions: hx of thyroid cancer 2007    Re-Eval:    Authorization: BOMN   1:1 with PT: [phone]   Visit Count 1 2 3 4 5   Date:  7/11       Manuals                                Neuro Re-Ed        Mini bridge HEP       Iso hip abd/add         Standing hip abd        Standing hip ext                                There Ex         Active warm up  Recumbent bike       LTR HEP       SKC HEP       Supine SLR        Leg press        Knee flexion machine                                Ther Act             Sit to stand  HEP                                                                   Education

## 2024-07-12 ENCOUNTER — HOSPITAL ENCOUNTER (OUTPATIENT)
Dept: RADIOLOGY | Age: 64
Discharge: HOME | End: 2024-07-12
Payer: MEDICARE

## 2024-07-12 DIAGNOSIS — R93.89 THICKENED ENDOMETRIUM: ICD-10-CM

## 2024-07-12 DIAGNOSIS — Z78.0 ASYMPTOMATIC POSTMENOPAUSAL ESTROGEN DEFICIENCY: ICD-10-CM

## 2024-07-12 PROCEDURE — 77080 DXA BONE DENSITY AXIAL: CPT

## 2024-07-12 PROCEDURE — 76830 TRANSVAGINAL US NON-OB: CPT

## 2024-07-12 PROCEDURE — 76856 US EXAM PELVIC COMPLETE: CPT

## 2024-07-16 ENCOUNTER — OFFICE VISIT (OUTPATIENT)
Dept: PHYSICAL THERAPY | Facility: CLINIC | Age: 64
End: 2024-07-16
Payer: MEDICARE

## 2024-07-16 DIAGNOSIS — M54.50 CHRONIC BILATERAL LOW BACK PAIN WITHOUT SCIATICA: Primary | ICD-10-CM

## 2024-07-16 DIAGNOSIS — G89.29 CHRONIC BILATERAL LOW BACK PAIN WITHOUT SCIATICA: Primary | ICD-10-CM

## 2024-07-16 PROCEDURE — 97110 THERAPEUTIC EXERCISES: CPT | Performed by: PHYSICAL THERAPIST

## 2024-07-16 PROCEDURE — 97530 THERAPEUTIC ACTIVITIES: CPT | Performed by: PHYSICAL THERAPIST

## 2024-07-16 PROCEDURE — 97112 NEUROMUSCULAR REEDUCATION: CPT | Performed by: PHYSICAL THERAPIST

## 2024-07-16 NOTE — PROGRESS NOTES
"Daily Note     Today's date: 2024  Patient name: Chel Isabel  : 1960  MRN: [id number]  Referring provider: Hema Garcia*  Dx:   Encounter Diagnosis     ICD-10-CM    1. Chronic bilateral low back pain without sciatica  M54.50     G89.29                      Subjective: Pt reports compliance with HEP       Objective: See treatment diary below      Assessment: Patient did well with treatment today. Initiated hip strengthening in a Wbing position such as standing hip abd/ext. Pt was fatigued by the end of the session but no significant pian. Added standing hip exercises to HEP.      Plan: Continue per plan of care.      Precautions: hx of thyroid cancer     Re-Eval:    Authorization: BOMN   1:1 with PT: [phone]   Visit Count 1 2 3 4 5   Date:        Manuals                                Neuro Re-Ed        Mini bridge HEP 2x10      Iso hip abd/add   10x10\"      Standing hip abd  2x10      Standing hip ext  2x10      Side step   6 laps                       There Ex         Active warm up  Recumbent bike 7'      LTR HEP       SKC HEP       Supine SLR  2x10 B      Leg press  50# 2x10       Knee flexion machine                                Ther Act             Sit to stand  HEP Table 2x10                                                                  Education                                            "

## 2024-07-18 ENCOUNTER — OFFICE VISIT (OUTPATIENT)
Dept: PHYSICAL THERAPY | Facility: CLINIC | Age: 64
End: 2024-07-18
Payer: MEDICARE

## 2024-07-18 DIAGNOSIS — G89.29 CHRONIC BILATERAL LOW BACK PAIN WITHOUT SCIATICA: Primary | ICD-10-CM

## 2024-07-18 DIAGNOSIS — M54.50 CHRONIC BILATERAL LOW BACK PAIN WITHOUT SCIATICA: Primary | ICD-10-CM

## 2024-07-18 PROCEDURE — 97530 THERAPEUTIC ACTIVITIES: CPT | Performed by: PHYSICAL THERAPIST

## 2024-07-18 PROCEDURE — 97112 NEUROMUSCULAR REEDUCATION: CPT | Performed by: PHYSICAL THERAPIST

## 2024-07-18 PROCEDURE — 97110 THERAPEUTIC EXERCISES: CPT | Performed by: PHYSICAL THERAPIST

## 2024-07-18 NOTE — PROGRESS NOTES
"Daily Note     Today's date: 2024  Patient name: Chel Isabel  : 1960  MRN: [id number]  Referring provider: Hema Garcia*  Dx:   Encounter Diagnosis     ICD-10-CM    1. Chronic bilateral low back pain without sciatica  M54.50     G89.29                      Subjective: Pt reports not feeling too bad after last session       Objective: See treatment diary below      Assessment: Patient did well with treatment. Fatigued by the end of the session but she also cleaned her whole house today and was fatigued at the beginning of the session. Some cueing required for standing hip exercises. Progress as tolerated.       Plan: Continue per plan of care.      Precautions: hx of thyroid cancer     Re-Eval:    Authorization: BOMN   1:1 with PT: [phone]   Visit Count 1 2 3 4 5   Date:       Manuals                                Neuro Re-Ed        Mini bridge HEP 2x10 2x10     Iso hip abd/add   10x10\"      Standing hip abd  2x10 3x10     Standing hip ext  2x10 3x10     Side step   6 laps                       There Ex         Active warm up  Recumbent bike 7' Recumbent bike 7'      LTR HEP       SKC HEP       Supine SLR  2x10 B 2x10     Leg press  50# 2x10  50# 3x10     Knee flexion machine                                Ther Act             Sit to stand  HEP Table 2x10 Table 2x10                                                                 Education                                              "

## 2024-07-23 ENCOUNTER — OFFICE VISIT (OUTPATIENT)
Dept: PHYSICAL THERAPY | Facility: CLINIC | Age: 64
End: 2024-07-23
Payer: MEDICARE

## 2024-07-23 DIAGNOSIS — G89.29 CHRONIC BILATERAL LOW BACK PAIN WITHOUT SCIATICA: Primary | ICD-10-CM

## 2024-07-23 DIAGNOSIS — M54.50 CHRONIC BILATERAL LOW BACK PAIN WITHOUT SCIATICA: Primary | ICD-10-CM

## 2024-07-23 PROCEDURE — 97110 THERAPEUTIC EXERCISES: CPT | Performed by: PHYSICAL THERAPIST

## 2024-07-23 PROCEDURE — 97530 THERAPEUTIC ACTIVITIES: CPT | Performed by: PHYSICAL THERAPIST

## 2024-07-23 PROCEDURE — 97112 NEUROMUSCULAR REEDUCATION: CPT | Performed by: PHYSICAL THERAPIST

## 2024-07-23 NOTE — PROGRESS NOTES
"Daily Note     Today's date: 2024  Patient name: Chel Isabel  : 1960  MRN: [id number]  Referring provider: Hema Garcia*  Dx:   Encounter Diagnosis     ICD-10-CM    1. Chronic bilateral low back pain without sciatica  M54.50     G89.29                      Subjective: Pt reports that she hasn't had a chance to get exercises in all of the time as she has family staying with her       Objective: See treatment diary below      Assessment: Patient had intermittent low back discomfort after completing standing hip exercises which decreased with lumbar pball stretching. Continued to focus on hip strengthening and added in standing TA endurance exercises as well. Continue to progress as tolerated.       Plan: Continue per plan of care.      Precautions: hx of thyroid cancer     Re-Eval:    Authorization: ADAMA   1:1 with PT: [phone]   Visit Count 1 2 3 4 5   Date:      Manuals                                Neuro Re-Ed        Mini bridge HEP 2x10 2x10 2x10     Iso hip abd/add   10x10\"      Standing hip abd  2x10 3x10 3x10    Standing hip ext  2x10 3x10 3x10    Side step   6 laps       Clamshells    20x    TA + straight arm pull down    GTB 3x10            There Ex         Active warm up  Recumbent bike 7' Recumbent bike 7'  Recumbent bike 7'     LTR HEP       SKC HEP       Supine SLR  2x10 B 2x10 2x10    Leg press  50# 2x10  50# 3x10 70# 3x10    Knee flexion machine         Lumbar pball rolls    5x ea dir                    Ther Act             Sit to stand  HEP Table 2x10 Table 2x10 Table 2x10    [place] walkback    15# 10x    Mohan side step     7# 5x                                                Education                                                "

## 2024-07-25 ENCOUNTER — OFFICE VISIT (OUTPATIENT)
Dept: PHYSICAL THERAPY | Facility: CLINIC | Age: 64
End: 2024-07-25
Payer: MEDICARE

## 2024-07-25 DIAGNOSIS — G89.29 CHRONIC BILATERAL LOW BACK PAIN WITHOUT SCIATICA: Primary | ICD-10-CM

## 2024-07-25 DIAGNOSIS — M54.50 CHRONIC BILATERAL LOW BACK PAIN WITHOUT SCIATICA: Primary | ICD-10-CM

## 2024-07-25 PROCEDURE — 97530 THERAPEUTIC ACTIVITIES: CPT | Performed by: PHYSICAL THERAPIST

## 2024-07-25 PROCEDURE — 97112 NEUROMUSCULAR REEDUCATION: CPT | Performed by: PHYSICAL THERAPIST

## 2024-07-25 PROCEDURE — 97110 THERAPEUTIC EXERCISES: CPT | Performed by: PHYSICAL THERAPIST

## 2024-07-25 NOTE — PROGRESS NOTES
"Daily Note     Today's date: 2024  Patient name: Chel Isabel  : 1960  MRN: [id number]  Referring provider: Hema Garcia*  Dx:   Encounter Diagnosis     ICD-10-CM    1. Chronic bilateral low back pain without sciatica  M54.50     G89.29                      Subjective: Pt reports less pain in the morning and having an easier time getting out of bed       Objective: See treatment diary below      Assessment: Tried mini squats at the bar but pt was having discomfort in her right knee so d/c. Progressed hip strengthening by adding in band walks which pt had positive response to; added to HEP. Pt fatigued by the end of the session.      Plan: Continue per plan of care.      Precautions: hx of thyroid cancer     Re-Eval:    Authorization: ADAMA   1:1 with PT: [phone]   Visit Count 1 2 3 4 5   Date:     Manuals                                Neuro Re-Ed        Mini bridge HEP 2x10 2x10 2x10  2x10   Iso hip abd/add   10x10\"      Standing hip abd  2x10 3x10 3x10    Standing hip ext  2x10 3x10 3x10    Side step   6 laps    YTB 1'    Clamshells    20x 30x   TA + straight arm pull down    GTB 3x10 [place] 10x 12#            There Ex         Active warm up  Recumbent bike 7' Recumbent bike 7'  Recumbent bike 7'  Recumbent bike 7'    LTR HEP       SKC HEP       Supine SLR  2x10 B 2x10 2x10 2x10   Leg press  50# 2x10  50# 3x10 70# 3x10    Knee flexion machine         Lumbar pball rolls    5x ea dir                    Ther Act             Sit to stand  HEP Table 2x10 Table 2x10 Table 2x10    [place] walkback    15# 10x 15# 10x   Mohan side step     7# 5x 7# 5x   Step ups     6\" 20x    Suitcase carry                                    Education                                                  "

## 2024-07-30 ENCOUNTER — APPOINTMENT (OUTPATIENT)
Dept: PHYSICAL THERAPY | Facility: CLINIC | Age: 64
End: 2024-07-30
Payer: MEDICARE

## 2024-07-31 ENCOUNTER — PROBLEM (OUTPATIENT)
Dept: URBAN - METROPOLITAN AREA CLINIC 6 | Facility: CLINIC | Age: 64
End: 2024-07-31

## 2024-07-31 DIAGNOSIS — H00.024: ICD-10-CM

## 2024-07-31 PROCEDURE — 92012 INTRM OPH EXAM EST PATIENT: CPT

## 2024-07-31 ASSESSMENT — VISUAL ACUITY
OS_PH: 20/50-1
OD_CC: 20/20
OS_CC: 20/80

## 2024-07-31 ASSESSMENT — TONOMETRY
OS_IOP_MMHG: 14
OD_IOP_MMHG: 16

## 2024-08-06 ENCOUNTER — HOSPITAL ENCOUNTER (OUTPATIENT)
Dept: NON INVASIVE DIAGNOSTICS | Facility: CLINIC | Age: 64
Discharge: HOME | End: 2024-08-06
Payer: MEDICARE

## 2024-08-06 DIAGNOSIS — I87.2 VENOUS INSUFFICIENCY: ICD-10-CM

## 2024-08-06 PROCEDURE — 93970 EXTREMITY STUDY: CPT

## 2024-08-09 ENCOUNTER — OFFICE VISIT (OUTPATIENT)
Dept: OBGYN CLINIC | Facility: CLINIC | Age: 64
End: 2024-08-09
Payer: MEDICARE

## 2024-08-09 VITALS — HEART RATE: 80 BPM | DIASTOLIC BLOOD PRESSURE: 85 MMHG | SYSTOLIC BLOOD PRESSURE: 144 MMHG

## 2024-08-09 DIAGNOSIS — M18.0 PRIMARY OSTEOARTHRITIS OF BOTH FIRST CARPOMETACARPAL JOINTS: Primary | ICD-10-CM

## 2024-08-09 LAB
ALBUMIN SERPL-MCNC: 4.2 G/DL (ref 3.9–4.9)
ALP SERPL-CCNC: 125 IU/L (ref 44–121)
ALT SERPL-CCNC: 22 IU/L (ref 0–32)
AST SERPL-CCNC: 21 IU/L (ref 0–40)
BILIRUB SERPL-MCNC: 0.3 MG/DL (ref 0–1.2)
BUN SERPL-MCNC: 20 MG/DL (ref 8–27)
BUN/CREAT SERPL: 22 (ref 12–28)
CALCIUM SERPL-MCNC: 9.5 MG/DL (ref 8.7–10.3)
CHLORIDE SERPL-SCNC: 105 MMOL/L (ref 96–106)
CHOLEST SERPL-MCNC: 154 MG/DL (ref 100–199)
CO2 SERPL-SCNC: 25 MMOL/L (ref 20–29)
CREAT SERPL-MCNC: 0.91 MG/DL (ref 0.57–1)
EGFR: 70 ML/MIN/1.73
ERYTHROCYTE [DISTWIDTH] IN BLOOD BY AUTOMATED COUNT: 13.4 % (ref 11.7–15.4)
EST. AVERAGE GLUCOSE BLD GHB EST-MCNC: 120 MG/DL
GLOBULIN SER-MCNC: 2.8 G/DL (ref 1.5–4.5)
GLUCOSE SERPL-MCNC: 88 MG/DL (ref 70–99)
HBA1C MFR BLD: 5.8 % (ref 4.8–5.6)
HCT VFR BLD AUTO: 40.4 % (ref 34–46.6)
HDLC SERPL-MCNC: 62 MG/DL
HGB BLD-MCNC: 13.5 G/DL (ref 11.1–15.9)
LDLC SERPL CALC-MCNC: 75 MG/DL (ref 0–99)
LDLC/HDLC SERPL: 1.2 RATIO (ref 0–3.2)
MCH RBC QN AUTO: 29.9 PG (ref 26.6–33)
MCHC RBC AUTO-ENTMCNC: 33.4 G/DL (ref 31.5–35.7)
MCV RBC AUTO: 90 FL (ref 79–97)
PLATELET # BLD AUTO: 236 X10E3/UL (ref 150–450)
POTASSIUM SERPL-SCNC: 4.2 MMOL/L (ref 3.5–5.2)
PROT SERPL-MCNC: 7 G/DL (ref 6–8.5)
RBC # BLD AUTO: 4.51 X10E6/UL (ref 3.77–5.28)
SL AMB VLDL CHOLESTEROL CALC: 17 MG/DL (ref 5–40)
SODIUM SERPL-SCNC: 143 MMOL/L (ref 134–144)
TRIGL SERPL-MCNC: 92 MG/DL (ref 0–149)
TSH SERPL DL<=0.005 MIU/L-ACNC: 1.89 UIU/ML (ref 0.45–4.5)
WBC # BLD AUTO: 7.5 X10E3/UL (ref 3.4–10.8)

## 2024-08-09 PROCEDURE — 99214 OFFICE O/P EST MOD 30 MIN: CPT | Performed by: PHYSICAL MEDICINE & REHABILITATION

## 2024-08-09 RX ORDER — PREDNISONE 20 MG/1
40 TABLET ORAL
Qty: 10 TABLET | Refills: 0 | Status: SHIPPED | OUTPATIENT
Start: 2024-08-09 | End: 2024-08-14

## 2024-08-09 NOTE — PROGRESS NOTES
1. Primary osteoarthritis of both first carpometacarpal joints  Small joint arthrocentesis: bilateral thumb CMC    predniSONE 20 mg tablet        Orders Placed This Encounter   Procedures    Small joint arthrocentesis: bilateral thumb CMC        Impression:  Patient is here in follow up of bilateral hand pain likely secondary to CMC osteoarthritis.  Treatment has included right first CMC steroid injection, Voltaren gel and comfort cool gloves.  Continue with Voltaren and comfort cool.  Patient's symptoms have improved since her last visit.  We decided to hold off on injection.  I have sent an oral steroid burst if she develops pain.  I will see her back in 6-8 weeks for injections if she is symptomatic.     Patient also presents with bilateral knee pain likely secondary to osteoarthritis.  Treatment has included bilateral knee steroid injection. Can consider HA injections if still symptomatic. She is currently using celecoxib.     Imaging Studies (I personally reviewed images in PACS and report):  Right hand x-rays most recent to this encounter reviewed.  These images show severe osteoarthritis of the CMC joint.     Right knee x-rays most recent to this encounter reviewed.  These images show tricompartmental osteoarthritis is a small joint effusion.  These findings are consistent with Kellgren Efrain grade 2 osteoarthritis.  There is also a sesamoid bone consistent with a fabella.    Left hand x-rays show severe 1st CMC joint OA.  Scattered and severe IP joint osteoarthritis. No acute osseous abnormalities.     Left knee x-rays show medial joint space and patellofemoral joint space narrowing with osteophytes.  Likely intraarticular loose body in the suprapatellar pouch.  This is consistent with Kellgren Efrain grade 3 OA.    No follow-ups on file.    Patient is in agreement with the above plan.    HPI:  Chel Isabel is a 64 y.o. female  who presents in follow up.  Here for   Chief Complaint   Patient presents with     Right Thumb - Pain, Injections    Left Thumb - Pain, Injections       Since last visit: See above.    Following history reviewed and updated:  Past Medical History:   Diagnosis Date    Arthritis     Asthma     BRCA1 negative     BRCA2 negative     Cancer (HCC)     thyroid cancer    Colon polyp     Disease of thyroid gland     Fibroid     History of COVID-19 01/04/2022    mild cold s/s    Hyperlipidemia     Hypertension     Hypothyroidism     Postmenopausal bleeding 11/23/2021    Varicella      Past Surgical History:   Procedure Laterality Date    ADENOIDECTOMY      BREAST BIOPSY      BREAST CYST EXCISION Right 1991    Benign    BREAST LUMPECTOMY      ME HYSTEROSCOPY BX ENDOMETRIUM&/POLYPC W/WO D&C N/A 03/07/2022    Procedure: DILATATION AND CURETTAGE (D&C) WITH HYSTEROSCOPY, polypectomy;  Surgeon: Cynthia Copeland MD;  Location[facility] MA[facility];  Service: Gynecology    THYROIDECTOMY      TONSILLECTOMY      VARICOSE VEIN SURGERY Bilateral      Social History   Social History     Substance and Sexual Activity   Alcohol Use Yes    Alcohol/week: 1.0 standard drink of alcohol    Types: 1 Glasses of wine per week    Comment: Only on holidays     Social History     Substance and Sexual Activity   Drug Use Never     Social History     Tobacco Use   Smoking Status Never    Passive exposure: Never   Smokeless Tobacco Never     Family History   Problem Relation Age of Onset    Breast cancer Mother 50    Heart disease Mother     Heart disease Father     Stroke Father     Kidney disease Father     Breast cancer Sister 50    Hypertension Sister     No Known Problems Daughter     No Known Problems Daughter     Diabetes unspecified Maternal Grandmother     Prostate cancer Maternal Grandfather     Diabetes Maternal Aunt     Diabetes Maternal Uncle     Diabetes Paternal Aunt     Breast cancer Cousin 45     Allergies   Allergen Reactions    Avocado (Diagnostic) - Food Allergy Swelling     Tongue swells    Citrullus Vulgaris Swelling      Tongue swells    Pineapple - Food Allergy Swelling     Tongue swells    Sulfa Antibiotics Rash        Constitutional:  /85   Pulse 80    General: NAD.  Eyes: Clear sclerae.  ENT: No inflammation, lesion, or mass of lips.  No tracheal deviation.  Musculoskeletal: As mentioned below.  Integumentary: No visible rashes or skin lesions.  Pulmonary/Chest: Effort normal. No respiratory distress.   Neuro: CN's grossly intact, MASON.  Psych: Normal affect and judgement.  Vascular: WWP.    Right Hand Exam     Tenderness   Right hand tenderness location: 1st CMC joint.    Range of Motion   The patient has normal right wrist ROM.     Other   Erythema: absent  Scars: absent  Sensation: normal  Pulse: present      Left Hand Exam     Tenderness   Left hand tenderness location: 1st CMC joint.     Range of Motion   The patient has normal left wrist ROM.    Other   Erythema: absent  Scars: absent  Sensation: normal  Pulse: present             Procedures

## 2024-08-10 PROCEDURE — 93970 EXTREMITY STUDY: CPT | Performed by: SURGERY

## 2024-08-20 ENCOUNTER — CONSULT (OUTPATIENT)
Dept: VASCULAR SURGERY | Facility: CLINIC | Age: 64
End: 2024-08-20
Payer: MEDICARE

## 2024-08-20 VITALS
WEIGHT: 234.2 LBS | TEMPERATURE: 97.9 F | HEIGHT: 66 IN | RESPIRATION RATE: 16 BRPM | SYSTOLIC BLOOD PRESSURE: 140 MMHG | BODY MASS INDEX: 37.64 KG/M2 | HEART RATE: 69 BPM | DIASTOLIC BLOOD PRESSURE: 72 MMHG | OXYGEN SATURATION: 99 %

## 2024-08-20 DIAGNOSIS — E66.9 OBESITY (BMI 35.0-39.9 WITHOUT COMORBIDITY): ICD-10-CM

## 2024-08-20 DIAGNOSIS — I87.2 VENOUS INSUFFICIENCY OF BOTH LOWER EXTREMITIES: Primary | ICD-10-CM

## 2024-08-20 DIAGNOSIS — I87.2 VENOUS INSUFFICIENCY: ICD-10-CM

## 2024-08-20 PROBLEM — E66.01 OBESITY, MORBID (HCC): Status: RESOLVED | Noted: 2024-06-07 | Resolved: 2024-08-20

## 2024-08-20 PROCEDURE — 99203 OFFICE O/P NEW LOW 30 MIN: CPT | Performed by: SURGERY

## 2024-08-20 NOTE — PROGRESS NOTES
Assessment/Plan:     Pt is a 63 yo F w/ HTN, GERD, hypothyroidism, neuropathy, OA, HLD, obesity, venous insufficiency    Venous insufficiency of both lower extremities  -     Ambulatory Referral to Vascular Surgery  -hx of what sounds like high ligation GSV B in [facility] '16  -hx of what sounds like B GSV EVLT in [facility] '21  -currently with mild edema, and constant aching feeling in BLE  -reviewed reflux study which shows expected ablation of B GSV; there is SSV reflux throughout on the R; L is competent  -reviewed reflux study findings and pathophysiology of venous disease in general; given her current level of symptoms, I do not recommend further intervention; discussed conservative management with daily comrpession, leg elevation, exercise, weight loss, and skin care  -f/u PRN or if symptoms worsen or develops wounds    Obesity (BMI 35.0-39.9 without comorbidity)  -     Ambulatory Referral to Weight Management; Future  -discussed role of obesity in venous disease and referred to weight management    Subjective:     Patient ID: Chel Isabel is a 64 y.o. female.    HPI:    Patient referred for evaluation of venous disease.    Patient complains of aching and burning in the legs bilaterally, mostly from the knee down.  This is continuous but worse when sitting.  She has some swelling at the ankles at the end of the day.    She has a hx of B GSV high ligation in '16 followed by injections of her varicose veins.  In '21, she had EVLT as staged procedures in the BLE of the GSV.    She is wearing compression every other day for 1 wk.  She is elevating her legs while watching TV about 1 hour a day.  She doesn't do any exercise.  She is not working on weight loss.    She did physical therapy for back pain and this was helping her.        Patient is here to establish care, referred by PCP. Patient has c/o heaviness in b/l legs and swelling. Patient does not wear compression stockings, she does elevate legs sometimes while watching TV.  "Patient is on Atorvastatin. Patient is a non smoker.      Review of Systems   Constitutional: Negative.    HENT: Negative.     Eyes: Negative.    Respiratory: Negative.     Cardiovascular:  Positive for leg swelling.   Gastrointestinal: Negative.    Endocrine: Negative.    Genitourinary: Negative.    Musculoskeletal: Negative.    Skin: Negative.    Allergic/Immunologic: Negative.    Neurological: Negative.    Hematological: Negative.    Psychiatric/Behavioral: Negative.           Objective:     Physical Exam  Cardiovascular:      Rate and Rhythm: Normal rate and regular rhythm.      Pulses:           Radial pulses are 2+ on the right side and 2+ on the left side.        Dorsalis pedis pulses are 2+ on the right side and 2+ on the left side.        Posterior tibial pulses are 2+ on the right side and 2+ on the left side.      Heart sounds: No murmur heard.  Pulmonary:      Effort: No respiratory distress.      Breath sounds: No wheezing or rales.   Musculoskeletal:      Right lower le+ Edema present.      Left lower le+ Edema present.   Skin:     Comments: Diffuse spiders B lowers legs and worst at the ankles; no varicosities, no stasis changes, no wounds           I have reviewed and made appropriate changes to the review of systems input by the medical assistant.    Vitals:    24 1124   BP: 140/72   BP Location: Right arm   Patient Position: Sitting   Cuff Size: Large   Pulse: 69   Resp: 16   Temp: 97.9 °F (36.6 °C)   TempSrc: Temporal   SpO2: 99%   Weight: 106 kg (234 lb 3.2 oz)   Height: 5' 6\" (1.676 m)       Patient Active Problem List   Diagnosis   • Allergic rhinitis   • Arthropathy, multiple sites   • History of malignant neoplasm of thyroid   • Essential hypertension   • Postoperative hypothyroidism   • Neuropathy   • Hypercholesteremia   • Urge incontinence   • Chronic pain of both knees   • hx Thyroid cancer   • Dysphonia   • Gastroesophageal reflux disease   • Paresis of right vocal fold   • " Reflux laryngitis   • Glottic insufficiency   • Muscle tension dysphonia   • Xerostomia   • Hashimoto's thyroiditis   • Pharyngoesophageal dysphagia   • Hx of total thyroidectomy   • Obesity (BMI 35.0-39.9 without comorbidity)   • Right wrist pain   • Primary osteoarthritis of first carpometacarpal joint of right hand   • Sensorineural hearing loss (SNHL) of both ears   • Tinnitus of both ears   • Venous insufficiency of both lower extremities       Past Surgical History:   Procedure Laterality Date   • ADENOIDECTOMY     • BREAST BIOPSY     • BREAST CYST EXCISION Right 1991    Benign   • BREAST LUMPECTOMY     • AK HYSTEROSCOPY BX ENDOMETRIUM&/POLYPC W/WO D&C N/A 03/07/2022    Procedure: DILATATION AND CURETTAGE (D&C) WITH HYSTEROSCOPY, polypectomy;  Surgeon: Cynthia Copeland MD;  Location: [facility] MA[facility] OR;  Service: Gynecology   • THYROIDECTOMY     • TONSILLECTOMY     • VARICOSE VEIN SURGERY Bilateral        Family History   Problem Relation Age of Onset   • Breast cancer Mother 50   • Heart disease Mother    • Heart disease Father    • Stroke Father    • Kidney disease Father    • Breast cancer Sister 50   • Hypertension Sister    • No Known Problems Daughter    • No Known Problems Daughter    • Diabetes unspecified Maternal Grandmother    • Prostate cancer Maternal Grandfather    • Diabetes Maternal Aunt    • Diabetes Maternal Uncle    • Diabetes Paternal Aunt    • Breast cancer Cousin 45       Social History     Socioeconomic History   • Marital status: /Civil Union     Spouse name: Not on file   • Number of children: Not on file   • Years of education: Not on file   • Highest education level: Not on file   Occupational History   • Not on file   Tobacco Use   • Smoking status: Never     Passive exposure: Never   • Smokeless tobacco: Never   Vaping Use   • Vaping status: Never Used   Substance and Sexual Activity   • Alcohol use: Yes     Alcohol/week: 1.0 standard drink of alcohol     Types: 1 Glasses of wine  per week     Comment: Only on holidays   • Drug use: Never   • Sexual activity: Yes     Partners: Male     Birth control/protection: Post-menopausal   Other Topics Concern   • Not on file   Social History Narrative   • Not on file     Social Determinants of Health     Financial Resource Strain: Not on file   Food Insecurity: No Food Insecurity (6/7/2024)    Hunger Vital Sign    • Worried About Running Out of Food in the Last Year: Never true    • Ran Out of Food in the Last Year: Never true   Transportation Needs: No Transportation Needs (6/7/2024)    PRAPARE - Transportation    • Lack of Transportation (Medical): No    • Lack of Transportation (Non-Medical): No   Physical Activity: Not on file   Stress: Not on file   Social Connections: Not on file   Intimate Partner Violence: Not on file   Housing Stability: Low Risk  (6/7/2024)    Housing Stability Vital Sign    • Unable to Pay for Housing in the Last Year: No    • Number of Times Moved in the Last Year: 1    • Homeless in the Last Year: No       Allergies   Allergen Reactions   • Avocado (Diagnostic) - Food Allergy Swelling     Tongue swells   • Citrullus Vulgaris Swelling     Tongue swells   • Pineapple - Food Allergy Swelling     Tongue swells   • Sulfa Antibiotics Rash         Current Outpatient Medications:   •  albuterol (Ventolin HFA) 90 mcg/act inhaler, Inhale 2 puffs every 6 (six) hours as needed for wheezing, Disp: 18 g, Rfl: 2  •  atorvastatin (LIPITOR) 10 mg tablet, take 1 tablet by mouth once daily, Disp: 90 tablet, Rfl: 1  •  celecoxib (CeleBREX) 200 mg capsule, take 1 capsule by mouth twice a day if needed for moderate pain, Disp: 60 capsule, Rfl: 0  •  cetirizine (ZyrTEC) 10 mg tablet, as needed  , Disp: , Rfl:   •  cimetidine (TAGAMET) 400 mg tablet, take 1 tablet by mouth daily at bedtime, Disp: 90 tablet, Rfl: 3  •  fluticasone (FLONASE) 50 mcg/act nasal spray, 2 sprays into each nostril as needed for rhinitis, Disp: 16 g, Rfl: 3  •  omeprazole  (PriLOSEC) 40 MG capsule, take 1 capsule by mouth once daily, Disp: 90 capsule, Rfl: 1  •  Synthroid 175 MCG tablet, Take 1 tablet (175 mcg total) by mouth daily, Disp: 90 tablet, Rfl: 3  •  traZODone (DESYREL) 50 mg tablet, Take 0.5-2 tablets ( mg total) by mouth daily at bedtime as needed for sleep, Disp: 60 tablet, Rfl: 2  •  trospium chloride (SANCTURA) 20 mg tablet, Take 20 mg by mouth 2 (two) times a day, Disp: , Rfl:   •  valsartan-hydrochlorothiazide (DIOVAN-HCT) 80-12.5 MG per tablet, take 1 tablet by mouth once daily, Disp: 90 tablet, Rfl: 1

## 2024-08-20 NOTE — PATIENT INSTRUCTIONS
"For compression, look for knee high, 20-30mmHg, any color/fabric is okay  Measure your leg in the AM when you first wake up and fit yourself into the size guide for the brand you choose  Try \"[email or web address]\"  "

## 2024-08-28 ENCOUNTER — ESTABLISHED COMPREHENSIVE EXAM (OUTPATIENT)
Dept: URBAN - METROPOLITAN AREA CLINIC 6 | Facility: CLINIC | Age: 64
End: 2024-08-28

## 2024-08-28 DIAGNOSIS — H04.123: ICD-10-CM

## 2024-08-28 DIAGNOSIS — H25.13: ICD-10-CM

## 2024-08-28 DIAGNOSIS — H00.024: ICD-10-CM

## 2024-08-28 DIAGNOSIS — H35.373: ICD-10-CM

## 2024-08-28 PROCEDURE — 92014 COMPRE OPH EXAM EST PT 1/>: CPT

## 2024-08-28 PROCEDURE — 92250 FUNDUS PHOTOGRAPHY W/I&R: CPT

## 2024-08-28 ASSESSMENT — VISUAL ACUITY
OS_CC: 20/20-1
OU_CC: J1+
OD_CC: 20/20

## 2024-08-28 ASSESSMENT — TONOMETRY
OD_IOP_MMHG: 16
OS_IOP_MMHG: 17

## 2024-08-29 DIAGNOSIS — F51.01 PRIMARY INSOMNIA: ICD-10-CM

## 2024-08-29 RX ORDER — TRAZODONE HYDROCHLORIDE 50 MG/1
TABLET, FILM COATED ORAL
Qty: 60 TABLET | Refills: 5 | Status: SHIPPED | OUTPATIENT
Start: 2024-08-29

## 2024-09-20 DIAGNOSIS — J45.20 MILD INTERMITTENT REACTIVE AIRWAY DISEASE WITHOUT COMPLICATION: ICD-10-CM

## 2024-09-20 RX ORDER — ALBUTEROL SULFATE 90 UG/1
2 INHALANT RESPIRATORY (INHALATION) EVERY 6 HOURS PRN
Qty: 18 G | Refills: 1 | Status: SHIPPED | OUTPATIENT
Start: 2024-09-20

## 2024-09-20 NOTE — TELEPHONE ENCOUNTER
Reason for call:   [x] Refill   [] Prior Auth  [] Other:     Office:   [x] PCP/Provider -   [] Specialty/Provider -     Medication: albuterol (Ventolin HFA) 90 mcg/act inhaler     Dose/Frequency: Inhale 2 puffs every 6 (six) hours as needed for wheezing     Quantity: 18 g     Pharmacy: [facility] [place] [facility]to Recinos    Does the patient have enough for 3 days?   [] Yes   [x] No - Send as HP to POD

## 2024-10-03 DIAGNOSIS — F51.01 PRIMARY INSOMNIA: ICD-10-CM

## 2024-10-03 RX ORDER — TRAZODONE HYDROCHLORIDE 50 MG/1
TABLET, FILM COATED ORAL
Qty: 180 TABLET | Refills: 1 | Status: SHIPPED | OUTPATIENT
Start: 2024-10-03

## 2024-10-04 NOTE — PROGRESS NOTES
Weight Management Medical Nutrition Assessment  Chel presented for a meal planning session. Today's weight is 234.4#. Hx of HLD, HTN and Prediabetes( recent HgA1C  5.8 mg/dl 2024)  Has shrunk in height now 5 foot 5 inches tall has a DEXA scan done this past 2024 but states it was normal. Pt is enjoying care home with her  and states eating out around 4 times a week. States a desire to lose weight to reduce her medications and recent prediabetes diagnosis.     Per dietary recall patient consumes excess calories from calorie dense meals when dining out frequently and snacking on sweets in the afternoon. Diet is inadequate in protein. Patient finds it difficult to eat foods that are higher in protein as she enjoys carbohydrates. Pt is not open to tracking meals or calories at this time. Educated on the Myplate method and the importance of balancing protein and fiber to ensure that lean muscle mass is maintained with aging.    Per dietary recall patient consumes excess calories from               .  Developed and reviewed a low calorie meal plan     Goals:  1) Wal3%karin 15-20 minutes a day 2-3 times a week  2) Using Myplate method at dinner time  3) Making 1 snack choice balanced with protein and fiber     Patient seen by Medical Provider in past 6 months:  no  Requested to schedule appointment with Medical Provider: No      Anthropometric Measurements  Start Weight (#): 241.2# (2022)  Current Weight (#): 234.4#  TBW % Change from start weight:3%  Ideal Body Weight (#):125#  Goal Weight (#):ST# LTG : 180#   Highest: 250#  Lowest: not obtained    Weight Loss History  Previous weight loss attempts: Nutrition Counseling with RD, Previous weight loss attempts: Self Created Diets (Portion Control, Healthy Food Choices, etc.)   Bicycle and Walking    Food and Nutrition Related History  Wake up: 6 am   Bed Time: 11 pm    Food Recall  Breakfast:8 am: coffee 1/2 tsp sugar/ milk -unmeasured, Bowl of  Rice Krispies with half of a cup of milk   Snack:Greek yogurt Chobani or banana or skip if eating out for brunch OR gummy bears ( 20 pieces at a time)  Lunch: Lunch out to diner 3 -4 times a week. 2 Eggs over easy 2 strips harkins, wheat toast, half home fries, Unsweetened ice tea with lemon. Or Welsh food Or homemade chicken salad sandwich with glass of milk   Snack:yogurt or banana sometimes  Dinner:Grilled steak + Rice unmeasured + Broccoli OR Pasta with meatballs   Snack:skip             Beverages: skim milk, 1% milk 16 ounces, and unsweetened ice tea, water 16 ounces  Volume of beverage intake: 40 ounces at least    Weekends: Same,  Eat out twice a weekend  Cravings: gummy bears, chocolate  Trouble area of day:after dinner    Frequency of Eating out: 4- 5 times a week with   Food restrictions:n/a  Cooking: self and   Food Shopping: self and     Physical Activity Intake  Activity:Walking 10 minutes   Frequency:daily  Physical limitations/barriers to exercise: knee pain    Estimated Needs  Energy  [place] [employer] Energy Needs: BMR : 1669 calories    1-2# loss weekly sedentary:  7269-3300 calories             1-2# loss weekly lightly active:2149-3045 calories  Maintenance calories for sedentary activity level: 2003 calories  Protein:70-90gm      (1.2-1.5g/kg IBW)  Fluid: 69oz     (35mL/kg IBW)    Nutrition Diagnosis  Yes;    Overweight/obesity  related to Excess energy intake as evidenced by  BMI more than normative standard for age and sex (obesity-grade II 35-39.9)       Nutrition Intervention    Nutrition Prescription  Calories:1200 calories on sedentary day and flex to 1400 calories on walk days  Protein:70-90gm  Fluid:70oz      Meal Plan (Dread/Pro/Carb)  Breakfast: 200-300/20/30  Snack: skip  Lunch: 300/30/30-45  Snack: 150/>5/20  Dinner: 300/30/30-45  Snack: 150/>5/20    Nutrition Education:    Calorie controlled menu  Lean protein food choices  Healthy snack options  Food journaling  tips      Nutrition Counseling:  Strategies: meal planning, portion sizes, healthy snack choices, hydration, fiber intake, protein intake, exercise, food journal      Monitoring and Evaluation:  Evaluation criteria:  Energy Intake  Meet protein needs  Maintain adequate hydration  Monitor weekly weight  Meal planning/preparation  Food journal   Decreased portions at mealtimes and snacks  Physical activity     Barriers to learning:none  Readiness to change: Preparation:  (Getting ready to change)   Comprehension: very good  Expected Compliance: very good

## 2024-10-07 ENCOUNTER — CLINICAL SUPPORT (OUTPATIENT)
Age: 64
End: 2024-10-07

## 2024-10-07 VITALS — BODY MASS INDEX: 39.05 KG/M2 | WEIGHT: 234.4 LBS | HEIGHT: 65 IN

## 2024-10-07 DIAGNOSIS — R63.5 ABNORMAL WEIGHT GAIN: Primary | ICD-10-CM

## 2024-10-07 DIAGNOSIS — E66.9 OBESITY (BMI 35.0-39.9 WITHOUT COMORBIDITY): ICD-10-CM

## 2024-10-07 PROCEDURE — WMDI30

## 2024-10-07 PROCEDURE — RECHECK

## 2024-10-17 ENCOUNTER — TELEPHONE (OUTPATIENT)
Age: 64
End: 2024-10-17

## 2024-10-17 NOTE — TELEPHONE ENCOUNTER
Caller: Chel    Doctor: Ralf    Reason for call: Patient would like to  be put back on the schedule for tomorrow for her USGI for B/L [facility]. She CX as she was doing better, now she is having the pain again  She was scheduled for 10:15 Am Please advise  Thank you  Call back#: [phone]

## 2024-10-21 DIAGNOSIS — K21.9 GASTROESOPHAGEAL REFLUX DISEASE, UNSPECIFIED WHETHER ESOPHAGITIS PRESENT: ICD-10-CM

## 2024-10-21 RX ORDER — OMEPRAZOLE 40 MG/1
40 CAPSULE, DELAYED RELEASE ORAL DAILY
Qty: 90 CAPSULE | Refills: 1 | Status: SHIPPED | OUTPATIENT
Start: 2024-10-21

## 2024-10-24 NOTE — RESULT ENCOUNTER NOTE
ABUS benign.  Mammo BIRADS 2  Elevated TC risk 27.5% - continue ABUS each year
right normal/left normal

## 2024-11-05 ENCOUNTER — OFFICE VISIT (OUTPATIENT)
Dept: GASTROENTEROLOGY | Facility: AMBULARY SURGERY CENTER | Age: 64
End: 2024-11-05
Payer: MEDICARE

## 2024-11-05 VITALS
BODY MASS INDEX: 37.77 KG/M2 | HEIGHT: 66 IN | DIASTOLIC BLOOD PRESSURE: 88 MMHG | HEART RATE: 73 BPM | OXYGEN SATURATION: 98 % | WEIGHT: 235 LBS | SYSTOLIC BLOOD PRESSURE: 138 MMHG

## 2024-11-05 DIAGNOSIS — K76.89 HEPATIC CYST: Primary | ICD-10-CM

## 2024-11-05 PROCEDURE — 99204 OFFICE O/P NEW MOD 45 MIN: CPT | Performed by: STUDENT IN AN ORGANIZED HEALTH CARE EDUCATION/TRAINING PROGRAM

## 2024-11-05 NOTE — PROGRESS NOTES
[facility] - Outpatient Consultation  Chel Isabel 64 y.o. female MRN: [id number]  Encounter: [id number]    PCP:  Megan Garcia MD, [phone]  Referring Provider:  No ref. provider found,     Patient: Chel Isabel, 1960  Reason for Referral: hepatic cysts    ASSESSMENT/PLAN:  64 y.o. female with history of HTN, HLD, hypothyroidism, and thyroid cancer presents for initial evaluation for hepatic cyst.    She was incidentally found to have multiple hepatic cysts on MRI L-spine in 2018.  She had an MRI in 2019 at [facility] which showed multiple cysts involving both the right and left lobes with the largest measuring 6.1 cm segment 6.  There were a few lesions that showed complex characteristics as defined as thin septations and tiny daughter cysts.  There was no enhancing nodules or thickened septation concerning for malignancy. She was noted to have interval enlargement of hepatic cysts to 9.1 cm in February 2023.     She is otherwise asymptomatic and has normal liver chemistries, synthetic function and platelets. She has no clinical evidence of portal hypertension.    Would recommend an MRI with MRCP to further evaluate her cysts given prior complex features. Given interval enlargement, will need to evaluate for biliary cystadenoma. I also counseled the patient on alarming signs and symptoms that would suggest cyst rupture or hemorrhage.     She will return to clinic in 3 months or sooner if needed. Thank you for the opportunity to consult in her care.     - MRI abdomen with MRCP    Terri Rubio MD  Division of Gastroenterology and Hepatology  [facility]    ============================================================================  CC/HPI: 64 y.o. female with history of HTN, HLD, hypothyroidism, and thyroid cancer presents for initial evaluation for hepatic cyst.    She was incidentally found to have multiple hepatic cysts on MRI L-spine in 2018.  She had an MRI in  2019 at [facility] which showed multiple cysts involving both the right and left lobes with the largest measuring 6.1 cm segment 6.  There were a few lesions that showed complex characteristics as defined as thin septations and tiny daughter cysts.  There was no enhancing nodules or thickened dseptation concerning for malignancy.  Of note she was also found to have multiple renal cysts.    She was noted to have interval enlargement to 9.1 cm on CT in February 2023.     Her liver chemistries, synthetic function and platelets are otherwise normal. She does report intermittent RUQ discomfort when bending.     She denies family history of end-staged liver or kidney disease. She denies any exposures or travel outside the [place].     ROS: Complete review of systems otherwise negative.     PAST MEDICAL/SURGICAL HISTORY:  Past Medical History:   Diagnosis Date    Arthritis     Asthma     BRCA1 negative     BRCA2 negative     Cancer (HCC)     thyroid cancer    Colon polyp     Disease of thyroid gland     Fibroid     History of COVID-19 01/04/2022    mild cold s/s    Hyperlipidemia     Hypertension     Hypothyroidism     Postmenopausal bleeding 11/23/2021    Varicella         Past Surgical History:   Procedure Laterality Date    ADENOIDECTOMY      BREAST BIOPSY      BREAST CYST EXCISION Right 1991    Benign    BREAST LUMPECTOMY      ID HYSTEROSCOPY BX ENDOMETRIUM&/POLYPC W/WO D&C N/A 03/07/2022    Procedure: DILATATION AND CURETTAGE (D&C) WITH HYSTEROSCOPY, polypectomy;  Surgeon: Cynthia Copeland MD;  [facility] MAIN OR;  Service: Gynecology    THYROIDECTOMY      TONSILLECTOMY      VARICOSE VEIN SURGERY Bilateral        FAMILY/SOCIAL HISTORY:  Family History   Problem Relation Age of Onset    Breast cancer Mother 50    Heart disease Mother     Heart disease Father     Stroke Father     Kidney disease Father     Breast cancer Sister 50    Hypertension Sister     No Known Problems Daughter     No Known Problems Daughter     Diabetes  unspecified Maternal Grandmother     Prostate cancer Maternal Grandfather     Diabetes Maternal Aunt     Diabetes Maternal Uncle     Diabetes Paternal Aunt     Breast cancer Cousin 45       Social History     Tobacco Use    Smoking status: Never     Passive exposure: Never    Smokeless tobacco: Never   Vaping Use    Vaping status: Never Used   Substance Use Topics    Alcohol use: Yes     Alcohol/week: 1.0 standard drink of alcohol     Types: 1 Glasses of wine per week     Comment: Only on holidays    Drug use: Never       MEDICATIONS:  Current Outpatient Medications on File Prior to Visit   Medication Sig Dispense Refill    albuterol (Ventolin HFA) 90 mcg/act inhaler Inhale 2 puffs every 6 (six) hours as needed for wheezing 18 g 1    atorvastatin (LIPITOR) 10 mg tablet take 1 tablet by mouth once daily 90 tablet 1    celecoxib (CeleBREX) 200 mg capsule take 1 capsule by mouth twice a day if needed for moderate pain 60 capsule 0    cetirizine (ZyrTEC) 10 mg tablet as needed        cimetidine (TAGAMET) 400 mg tablet take 1 tablet by mouth daily at bedtime 90 tablet 3    fluticasone (FLONASE) 50 mcg/act nasal spray 2 sprays into each nostril as needed for rhinitis 16 g 3    omeprazole (PriLOSEC) 40 MG capsule take 1 capsule by mouth once daily 90 capsule 1    Synthroid 175 MCG tablet Take 1 tablet (175 mcg total) by mouth daily 90 tablet 3    traZODone (DESYREL) 50 mg tablet take 0.5 to 2 tablets by mouth daily at bedtime if needed for sleep 180 tablet 1    trospium chloride (SANCTURA) 20 mg tablet Take 20 mg by mouth 2 (two) times a day      valsartan-hydrochlorothiazide (DIOVAN-HCT) 80-12.5 MG per tablet take 1 tablet by mouth once daily 90 tablet 1     No current facility-administered medications on file prior to visit.       Allergies   Allergen Reactions    Avocado (Diagnostic) - Food Allergy Swelling     Tongue swells    Citrullus Vulgaris Swelling     Tongue swells    Pineapple - Food Allergy Swelling     Tongue  "swells    Sulfa Antibiotics Rash       PHYSICAL EXAM:  /88 (BP Location: Right arm, Patient Position: Sitting, Cuff Size: Standard)   Pulse 73   Ht 5' 6\" (1.676 m)   Wt 107 kg (235 lb)   SpO2 98%   BMI 37.93 kg/m²   GENERAL: NAD, AAO  HEENT: anicteric, OP clear, MMM  ABDOMEN: S/ND/NT, normoactive BS, no hepatomegaly, spleen not palpable  EXTREMITIES: no edema  SKIN: no rashes, no palmar erythema, no spider angiomata   NEURO: normal gait, no tremor, no asterixis     LABS/RADIOLOGY/ENDOSCOPY:  Lab Results   Component Value Date    WBC 7.5 08/07/2024    HGB 13.5 08/07/2024    HCT 40.4 08/07/2024     08/07/2024    BUN 20 08/07/2024    CREATININE 0.91 08/07/2024    K 4.2 08/07/2024     08/07/2024    CO2 25 08/07/2024    ALKPHOS 109 02/17/2023    ALT 22 08/07/2024    AST 21 08/07/2024    GLOB 2.8 08/07/2024    CALCIUM 9.8 08/18/2023    EGFR 70 08/07/2024    TRIG 92 08/07/2024    HDL 62 08/07/2024    PT 13.5 02/17/2023    INR 1.0 02/17/2023     MRI (3/2019)  Simple and mildly complex thinly septated hepatic cysts involving both lobes measuring up to 6.1 cm. No suspicious enhancing complex cystic neoplasm.     Simple and complex left renal cysts including a nonenhancing less than 2 cm hemorrhagic cyst involving the lower pole of the left kidney. No suspicious enhancing complex cystic renal carcinoma.     Redemonstration of cholelithiasis without MRI findings of acute cholecystitis nor biliary obstruction.     Computed MELD 3.0 unavailable. One or more values for this score either were not found within the given timeframe or did not fit some other criterion.  Computed MELD-Na unavailable. One or more values for this score either were not found within the given timeframe or did not fit some other criterion.          "

## 2024-11-11 NOTE — PROGRESS NOTES
Weight Management Medical Nutrition Assessment UPDATE NEEDS  Chel presented for a meal planning session. Today's weight is 234.4#. Hx of HLD, HTN and Prediabetes( recent HgA1C  5.8 mg/dl 2024)  Has shrunk in height now 5 foot 5 inches tall has a DEXA scan done this past 2024 but states it was normal. Pt is enjoying care home with her  and states eating out around 4 times a week. States a desire to lose weight to reduce her medications and recent prediabetes diagnosis.     Per dietary recall patient consumes excess calories from calorie dense meals when dining out frequently and snacking on sweets in the afternoon. Diet is inadequate in protein. Patient finds it difficult to eat foods that are higher in protein as she enjoys carbohydrates. Pt is not open to tracking meals or calories at this time. Educated on the Myplate method and the importance of balancing protein and fiber to ensure that lean muscle mass is maintained with aging. Interested in increasing her exercise and increasing her daily steps. Also looking to join the gym to increase strength training. Keeping in mind the need for protein and using protein shakes/protein balanced snacks 1 time a day in between meals.   Developed and reviewed a low calorie meal plan. Hydration adequate. Will continue to follow.      Goals:  1) Walking 15-20 minutes a day 2-3 times a week /increasing from 5,000 steps a day to 6,000 steps a day consider joining the gym to strength train)  2)Continue using Myplate method at dinner time  3) Making 1 snack choice balanced with protein and fiber     Patient seen by Medical Provider in past 6 months:  no  Requested to schedule appointment with Medical Provider: No      Anthropometric Measurements  Start Weight (#): 241.2# (2022)  Current Weight (#): 234.4#  TBW % Change from start weight:3%  Ideal Body Weight (#):125#  Goal Weight (#):ST# LTG : 180#   Highest: 250#  Lowest: not obtained    Weight Loss  History  Previous weight loss attempts: Nutrition Counseling with RD, Previous weight loss attempts: Self Created Diets (Portion Control, Healthy Food Choices, etc.)   Bicycle and Walking    Food and Nutrition Related History  Wake up: 6 am   Bed Time: 11 pm    Food Recall-eats 5 [place] kisses and 13 gummy bears a day  Breakfast:8 am: coffee 1/2 tsp sugar/ milk -unmeasured, Bowl of Rice Krispies with half of a cup of milk Occasionally 2 hard boiled eggs, 1 piece toast wheat oat butter, 1/2 cup blueberries or fruit  Snack:Greek yogurt Chobani or banana or skip if eating out for brunch OR gummy bears ( 20 pieces at a time) [place] balanced breaks or protein shake premier protein  Lunch: Lunch out to diner 3 -4 times a week. 2 Eggs over easy 2 strips harkins, wheat toast, half home fries, Unsweetened ice tea with lemon. Or Iraqi food Or homemade chicken salad sandwich with glass of milk   Snack:yogurt or banana sometimes  Dinner:Grilled steak + Rice unmeasured + Broccoli OR Pasta with meatballs   Snack:skip             Beverages: skim milk, 1% milk 16 ounces, and unsweetened ice tea, water 16 ounces  Volume of beverage intake: 40 ounces at least( 27 ounces of water)    Weekends: Same,  Eat out twice a weekend  Cravings: gummy bears, chocolate  Trouble area of day:after dinner    Frequency of Eating out: 4- 5 times a week with   Food restrictions:n/a  Cooking: self and   Food Shopping: self and     Physical Activity Intake  Activity:Walking 10 minutes   Frequency:daily  Physical limitations/barriers to exercise: knee pain    Estimated Needs  Energy  Lazaro Muñiz Energy Needs: BMR : 1610 calories    1-2# loss weekly sedentary:  932-1432 calories             1-2# loss weekly lightly active:1923-2056 calories  Maintenance calories for sedentary activity level: 1932 calories  Protein:68-85 gms      (1.2-1.5g/kg IBW)  Fluid: 66oz     (35mL/kg IBW)    Nutrition Diagnosis  Yes;     Overweight/obesity  related to Excess energy intake as evidenced by  BMI more than normative standard for age and sex (obesity-grade II 35-39.9)       Nutrition Intervention    Nutrition Prescription  Calories:1200 calories on sedentary day and flex to 1400 calories on walk days  Protein:70-90gm  Fluid:70oz      Meal Plan (Dread/Pro/Carb)  Breakfast: 200-300/20/30  Snack: skip  Lunch: 300/30/30-45  Snack: 150/>5/20  Dinner: 300/30/30-45  Snack: 150/>5/20    Nutrition Education:    Calorie controlled menu  Lean protein food choices  Healthy snack options  Food journaling tips      Nutrition Counseling:  Strategies: meal planning, portion sizes, healthy snack choices, hydration, fiber intake, protein intake, exercise, food journal      Monitoring and Evaluation:  Evaluation criteria:  Energy Intake  Meet protein needs  Maintain adequate hydration  Monitor weekly weight  Meal planning/preparation  Food journal   Decreased portions at mealtimes and snacks  Physical activity     Barriers to learning:none  Readiness to change: Action  Comprehension: very good  Expected Compliance: very good

## 2024-11-15 ENCOUNTER — CLINICAL SUPPORT (OUTPATIENT)
Age: 64
End: 2024-11-15

## 2024-11-15 VITALS — HEIGHT: 65 IN | BODY MASS INDEX: 39.09 KG/M2 | WEIGHT: 234.6 LBS

## 2024-11-15 DIAGNOSIS — R63.5 ABNORMAL WEIGHT GAIN: Primary | ICD-10-CM

## 2024-11-15 PROCEDURE — RECHECK

## 2024-11-15 PROCEDURE — DB3PK

## 2024-11-16 ENCOUNTER — HOSPITAL ENCOUNTER (OUTPATIENT)
Dept: MAMMOGRAPHY | Facility: CLINIC | Age: 64
Discharge: HOME | End: 2024-11-16
Payer: MEDICARE

## 2024-11-16 ENCOUNTER — HOSPITAL ENCOUNTER (OUTPATIENT)
Dept: ULTRASOUND IMAGING | Facility: CLINIC | Age: 64
Discharge: HOME | End: 2024-11-16
Payer: MEDICARE

## 2024-11-16 VITALS — BODY MASS INDEX: 38.99 KG/M2 | WEIGHT: 234 LBS | HEIGHT: 65 IN

## 2024-11-16 DIAGNOSIS — Z12.31 ENCOUNTER FOR SCREENING MAMMOGRAM FOR MALIGNANT NEOPLASM OF BREAST: ICD-10-CM

## 2024-11-16 DIAGNOSIS — R92.333 HETEROGENEOUSLY DENSE TISSUE OF BOTH BREASTS ON MAMMOGRAPHY: ICD-10-CM

## 2024-11-16 DIAGNOSIS — Z01.419 WELL WOMAN EXAM WITH ROUTINE GYNECOLOGICAL EXAM: ICD-10-CM

## 2024-11-16 DIAGNOSIS — Z91.89 INCREASED RISK OF BREAST CANCER: ICD-10-CM

## 2024-11-16 PROCEDURE — 77067 SCR MAMMO BI INCL CAD: CPT

## 2024-11-16 PROCEDURE — 76641 ULTRASOUND BREAST COMPLETE: CPT

## 2024-11-16 PROCEDURE — 77063 BREAST TOMOSYNTHESIS BI: CPT

## 2024-11-19 ENCOUNTER — RESULTS FOLLOW-UP (OUTPATIENT)
Dept: OBGYN CLINIC | Facility: CLINIC | Age: 64
End: 2024-11-19

## 2024-11-22 ENCOUNTER — OFFICE VISIT (OUTPATIENT)
Dept: OBGYN CLINIC | Facility: CLINIC | Age: 64
End: 2024-11-22
Payer: MEDICARE

## 2024-11-22 VITALS — SYSTOLIC BLOOD PRESSURE: 160 MMHG | HEART RATE: 80 BPM | DIASTOLIC BLOOD PRESSURE: 83 MMHG

## 2024-11-22 DIAGNOSIS — M18.0 PRIMARY OSTEOARTHRITIS OF BOTH FIRST CARPOMETACARPAL JOINTS: Primary | ICD-10-CM

## 2024-11-22 PROCEDURE — 99213 OFFICE O/P EST LOW 20 MIN: CPT | Performed by: PHYSICAL MEDICINE & REHABILITATION

## 2024-11-22 PROCEDURE — 20604 DRAIN/INJ JOINT/BURSA W/US: CPT | Performed by: PHYSICAL MEDICINE & REHABILITATION

## 2024-11-22 RX ORDER — ROPIVACAINE HYDROCHLORIDE 5 MG/ML
10 INJECTION, SOLUTION EPIDURAL; INFILTRATION; PERINEURAL
Status: COMPLETED | OUTPATIENT
Start: 2024-11-22 | End: 2024-11-22

## 2024-11-22 RX ORDER — BETAMETHASONE SODIUM PHOSPHATE AND BETAMETHASONE ACETATE 3; 3 MG/ML; MG/ML
3 INJECTION, SUSPENSION INTRA-ARTICULAR; INTRALESIONAL; INTRAMUSCULAR; SOFT TISSUE
Status: COMPLETED | OUTPATIENT
Start: 2024-11-22 | End: 2024-11-22

## 2024-11-22 RX ADMIN — BETAMETHASONE SODIUM PHOSPHATE AND BETAMETHASONE ACETATE 3 MG: 3; 3 INJECTION, SUSPENSION INTRA-ARTICULAR; INTRALESIONAL; INTRAMUSCULAR; SOFT TISSUE at 10:30

## 2024-11-22 RX ADMIN — ROPIVACAINE HYDROCHLORIDE 10 ML: 5 INJECTION, SOLUTION EPIDURAL; INFILTRATION; PERINEURAL at 10:30

## 2024-11-22 NOTE — PROGRESS NOTES
1. Primary osteoarthritis of both first carpometacarpal joints          No orders of the defined types were placed in this encounter.       Impression:  Patient is here in follow up of bilateral hand pain likely secondary to CMC osteoarthritis.  Treatment has included right first CMC steroid injection, Voltaren gel and comfort cool gloves.  Continue with Voltaren and comfort cool. Today we proceeded with BL USG 1st CMC steroid injections.     Patient also presents with bilateral knee pain likely secondary to osteoarthritis.  Treatment has included bilateral knee steroid injection. Can consider HA injections if still symptomatic. She is currently using celecoxib.     Imaging Studies (I personally reviewed images in PACS and report):  Right hand x-rays most recent to this encounter reviewed.  These images show severe osteoarthritis of the CMC joint.     Right knee x-rays most recent to this encounter reviewed.  These images show tricompartmental osteoarthritis is a small joint effusion.  These findings are consistent with Kellgren Efrain grade 2 osteoarthritis.  There is also a sesamoid bone consistent with a fabella.    Left hand x-rays show severe 1st CMC joint OA.  Scattered and severe IP joint osteoarthritis. No acute osseous abnormalities.     Left knee x-rays show medial joint space and patellofemoral joint space narrowing with osteophytes.  Likely intraarticular loose body in the suprapatellar pouch.  This is consistent with Kellgren Efrain grade 3 OA.       No follow-ups on file.    Patient is in agreement with the above plan.    HPI:  Chel Isabel is a 64 y.o. female  who presents in follow up.  Here for No chief complaint on file.      Since last visit: See above.    Following history reviewed and updated:  Past Medical History:   Diagnosis Date    Arthritis     Asthma     BRCA1 negative     BRCA2 negative     Cancer (HCC)     thyroid cancer    Colon polyp     Disease of thyroid gland     Fibroid     History  of COVID-19 01/04/2022    mild cold s/s    Hyperlipidemia     Hypertension     Hypothyroidism     Postmenopausal bleeding 11/23/2021    Varicella      Past Surgical History:   Procedure Laterality Date    ADENOIDECTOMY      BREAST BIOPSY      BREAST CYST EXCISION Right 1991    Benign    BREAST LUMPECTOMY      MN HYSTEROSCOPY BX ENDOMETRIUM&/POLYPC W/WO D&C N/A 03/07/2022    Procedure: DILATATION AND CURETTAGE (D&C) WITH HYSTEROSCOPY, polypectomy;  Surgeon: Cynthia Copeland MD;  [facility];  Service: Gynecology    THYROIDECTOMY      TONSILLECTOMY      VARICOSE VEIN SURGERY Bilateral      Social History   Social History     Substance and Sexual Activity   Alcohol Use Yes    Alcohol/week: 1.0 standard drink of alcohol    Types: 1 Glasses of wine per week    Comment: Only on holidays     Social History     Substance and Sexual Activity   Drug Use Never     Social History     Tobacco Use   Smoking Status Never    Passive exposure: Never   Smokeless Tobacco Never     Family History   Problem Relation Age of Onset    Breast cancer Mother 50    Heart disease Mother     Heart disease Father     Stroke Father     Kidney disease Father     Breast cancer Sister 50    Hypertension Sister     No Known Problems Daughter     No Known Problems Daughter     Diabetes unspecified Maternal Grandmother     Prostate cancer Maternal Grandfather     Diabetes Maternal Aunt     Diabetes Maternal Uncle     Diabetes Paternal Aunt     Breast cancer Cousin 45     Allergies   Allergen Reactions    Avocado (Diagnostic) - Food Allergy Swelling     Tongue swells    Citrullus Vulgaris Swelling     Tongue swells    Pineapple - Food Allergy Swelling     Tongue swells    Sulfa Antibiotics Rash        Constitutional:  There were no vitals taken for this visit.   General: NAD.  Eyes: Clear sclerae.  ENT: No inflammation, lesion, or mass of lips.  No tracheal deviation.  Musculoskeletal: As mentioned below.  Integumentary: No visible rashes or  skin lesions.  Pulmonary/Chest: Effort normal. No respiratory distress.   Neuro: CN's grossly intact, MASON.  Psych: Normal affect and judgement.  Vascular: WWP.    Right Hand Exam     Range of Motion   The patient has normal right wrist ROM.       Left Hand Exam     Range of Motion   The patient has normal left wrist ROM.             Small joint arthrocentesis: bilateral thumb CMC  Palmer Protocol:  procedure performed by consultantConsent: Verbal consent obtained. Written consent not obtained.  Risks and benefits: risks, benefits and alternatives were discussed  Consent given by: patient  Timeout called at: 11/22/2024 10:38 AM.  Patient understanding: patient states understanding of the procedure being performed  Site marked: the operative site was marked  Radiology Images displayed and confirmed. If images not available, report reviewed: imaging studies available  Patient identity confirmed: verbally with patient  Supporting Documentation  Indications: pain   Procedure Details  Location: thumb - bilateral thumb CMC  Needle size: 25 G  Ultrasound guidance: yes  Approach: dorsal    Medications (Right): 10 mL ropivacaine 0.5 %; 3 mg betamethasone acetate-betamethasone sodium phosphate 6 (3-3) mg/mLMedications (Left): 10 mL ropivacaine 0.5 %; 3 mg betamethasone acetate-betamethasone sodium phosphate 6 (3-3) mg/mL   Patient tolerance: patient tolerated the procedure well with no immediate complications

## 2024-11-24 ENCOUNTER — HOSPITAL ENCOUNTER (OUTPATIENT)
Dept: RADIOLOGY | Facility: HOSPITAL | Age: 64
Discharge: HOME | End: 2024-11-24
Attending: STUDENT IN AN ORGANIZED HEALTH CARE EDUCATION/TRAINING PROGRAM
Payer: MEDICARE

## 2024-11-24 DIAGNOSIS — K76.89 HEPATIC CYST: ICD-10-CM

## 2024-11-24 PROCEDURE — A9585 GADOBUTROL INJECTION: HCPCS | Performed by: STUDENT IN AN ORGANIZED HEALTH CARE EDUCATION/TRAINING PROGRAM

## 2024-11-24 PROCEDURE — 74183 MRI ABD W/O CNTR FLWD CNTR: CPT

## 2024-11-24 RX ORDER — GADOBUTROL 604.72 MG/ML
10 INJECTION INTRAVENOUS
Status: COMPLETED | OUTPATIENT
Start: 2024-11-24 | End: 2024-11-24

## 2024-11-24 RX ADMIN — GADOBUTROL 10 ML: 604.72 INJECTION INTRAVENOUS at 16:42

## 2024-12-02 ENCOUNTER — RESULTS FOLLOW-UP (OUTPATIENT)
Age: 64
End: 2024-12-02

## 2024-12-09 ENCOUNTER — OFFICE VISIT (OUTPATIENT)
Dept: FAMILY MEDICINE CLINIC | Facility: CLINIC | Age: 64
End: 2024-12-09
Payer: MEDICARE

## 2024-12-09 VITALS
SYSTOLIC BLOOD PRESSURE: 130 MMHG | OXYGEN SATURATION: 98 % | HEART RATE: 77 BPM | TEMPERATURE: 97.4 F | DIASTOLIC BLOOD PRESSURE: 84 MMHG | HEIGHT: 65 IN | BODY MASS INDEX: 39.72 KG/M2 | WEIGHT: 238.4 LBS

## 2024-12-09 DIAGNOSIS — G89.29 CHRONIC BILATERAL LOW BACK PAIN WITHOUT SCIATICA: ICD-10-CM

## 2024-12-09 DIAGNOSIS — E66.9 OBESITY (BMI 35.0-39.9 WITHOUT COMORBIDITY): ICD-10-CM

## 2024-12-09 DIAGNOSIS — R73.03 PREDIABETES: ICD-10-CM

## 2024-12-09 DIAGNOSIS — M67.442 GANGLION CYST OF FINGER OF LEFT HAND: Primary | ICD-10-CM

## 2024-12-09 DIAGNOSIS — E06.3 HASHIMOTO'S THYROIDITIS: ICD-10-CM

## 2024-12-09 DIAGNOSIS — M54.50 CHRONIC BILATERAL LOW BACK PAIN WITHOUT SCIATICA: ICD-10-CM

## 2024-12-09 DIAGNOSIS — E78.00 HYPERCHOLESTEREMIA: ICD-10-CM

## 2024-12-09 DIAGNOSIS — I87.2 VENOUS INSUFFICIENCY OF BOTH LOWER EXTREMITIES: ICD-10-CM

## 2024-12-09 DIAGNOSIS — I10 ESSENTIAL HYPERTENSION: ICD-10-CM

## 2024-12-09 PROCEDURE — 99214 OFFICE O/P EST MOD 30 MIN: CPT | Performed by: FAMILY MEDICINE

## 2024-12-09 PROCEDURE — G2211 COMPLEX E/M VISIT ADD ON: HCPCS | Performed by: FAMILY MEDICINE

## 2024-12-09 NOTE — PROGRESS NOTES
Name: Chel Isabel      : 1960      MRN: [id number]  Encounter Provider: Megan Garcia MD  Encounter Date: 2024   Encounter department: [facility]  :  Assessment & Plan  Ganglion cyst of finger of left hand  Obtain US and follow up with hand surgery for possible removal  Orders:    US MSK limited; Future    Ambulatory Referral to Orthopedic Surgery; Future    CBC; Future    Hashimoto's thyroiditis    Orders:    CBC; Future    TSH, 3rd generation with Free T4 reflex; Future    Hypercholesteremia    Orders:    Lipid Panel with Direct LDL reflex; Future    CBC; Future    Comprehensive metabolic panel; Future    Obesity (BMI 35.0-39.9 without comorbidity)      Orders:    CBC; Future    Essential hypertension  Chronic, stable    Orders:    CBC; Future    Prediabetes    Orders:    CBC; Future    Hemoglobin A1C; Future    Venous insufficiency of both lower extremities  Chronic, attempting weight loss prior to procedure intervention          Chronic bilateral low back pain without sciatica  Continue HEP from PT to prevent recurrence          Obtain labs prior to physical in       History of Present Illness     She has started weight management and started going to the gym 2x a week and plans to increase to 3x a week.         Patient reports improvement in back pain reported at last visit. She completed PT and has since joined a gym.   She saw ortho for wrist pain and has had improvement with that, as well.  She has seen GI/hepatology for cyst on liver and had follow up MRI/MRCP with reassuring results, f/u due in 1 year.   She saw her OBGYN for thickened endometrium with stable US, normal mammo.  She estalblished with weight management and is following their recommendations.   She had a consult with vascular surgery and is considering intervention - is trying to lose weight first.   Review of Systems   Constitutional:  Negative for chills and fever.   HENT:  Negative for  "congestion and sore throat.    Eyes:  Negative for pain and visual disturbance.   Respiratory:  Negative for cough and shortness of breath.    Cardiovascular:  Negative for chest pain and palpitations.   Gastrointestinal:  Negative for abdominal pain and nausea.   Genitourinary:  Negative for dysuria.   Musculoskeletal:  Positive for arthralgias. Negative for back pain and myalgias.   Skin:  Negative for rash and wound.   Neurological:  Negative for dizziness and headaches.   All other systems reviewed and are negative.    Medical History Reviewed by provider this encounter:  Tobacco  Allergies  Meds  Problems  Med Hx  Surg Hx  Fam Hx     .     Objective   /84   Pulse 77   Temp (!) 97.4 °F (36.3 °C)   Ht 5' 5\" (1.651 m)   Wt 108 kg (238 lb 6.4 oz)   SpO2 98%   BMI 39.67 kg/m²      Physical Exam  Vitals and nursing note reviewed.   Constitutional:       General: She is not in acute distress.     Appearance: She is well-developed. She is obese.   HENT:      Head: Normocephalic and atraumatic.      Right Ear: External ear normal.      Left Ear: External ear normal.      Nose: Nose normal.   Eyes:      Conjunctiva/sclera: Conjunctivae normal.   Neck:      Trachea: No tracheal deviation.   Cardiovascular:      Rate and Rhythm: Normal rate and regular rhythm.      Pulses: Normal pulses.      Heart sounds: Normal heart sounds. No murmur heard.  Pulmonary:      Effort: Pulmonary effort is normal.      Breath sounds: Normal breath sounds. No wheezing, rhonchi or rales.   Abdominal:      Tenderness: There is no abdominal tenderness.   Skin:     General: Skin is warm and dry.      Capillary Refill: Capillary refill takes less than 2 seconds.      Findings: No rash.   Neurological:      Mental Status: She is alert.      Cranial Nerves: No cranial nerve deficit.         "

## 2024-12-09 NOTE — ASSESSMENT & PLAN NOTE
Orders:    Lipid Panel with Direct LDL reflex; Future    CBC; Future    Comprehensive metabolic panel; Future

## 2024-12-09 NOTE — PROGRESS NOTES
Weight Management Medical Nutrition Assessment UPDATE NEEDS  Chel presented for a 2/3 bundle meal planning session. Today's weight is 233.6# loss of 0.8# since last visit loss of 3.2% TBW.  Hx of HLD, HTN and Prediabetes( recent HgA1C  5.8 mg/dl 08/07/2024)  Has shrunk in height now 5 foot 5 inches tall has a DEXA scan done this past July 2024 but states it was normal. Pt is enjoying skilled nursing with her  and states eating out around 4 times a week. States a desire to lose weight to reduce her medications and recent prediabetes diagnosis.     Per dietary recall patient consumes excess calories from calorie dense meals when dining out frequently and snacking on sweets in the afternoon. Diet is inadequate in protein. Patient finds it difficult to eat foods that are higher in protein as she enjoys carbohydrates. Pt is not open to tracking meals or calories at this time. Educated on the Myplate method and the importance of balancing protein and fiber to ensure that lean muscle mass is maintained with aging. Interested in increasing her exercise and increasing her daily steps. Joined the gym past few weeks  and started working out 2 days/ week with LTG of 3 days/ week. Increasing her protein intake  and using protein shakes/protein balanced snacks 1 time a day in between meals. Has decreased candy intake substantially and only consuming around 3 times a week. Developed and reviewed a low calorie meal plan. Hydration adequate. Will continue to follow.      Goals:  1) Continue walking 15-20 minutes a day 2-3 times a week /increasing from 5,000 steps a day to 6,000 steps a day. Start thinking to increase the gym to three times a week.   2)Start adding in 1-2 servings of vegetable at dinner time  3) Making 1 snack choice balanced with protein and fiber     Patient seen by Medical Provider in past 6 months:  no  Requested to schedule appointment with Medical Provider: No      Anthropometric Measurements  Start Weight  (#): 241.2# (2022)  Current Weight (#): 233.6#  TBW % Change from start weight:3%  Ideal Body Weight (#):125#  Goal Weight (#):ST# LTG : 180#   Highest: 250#  Lowest: not obtained    Weight Loss History  Previous weight loss attempts: Nutrition Counseling with RD, Previous weight loss attempts: Self Created Diets (Portion Control, Healthy Food Choices, etc.)   Bicycle and Walking    Food and Nutrition Related History  Wake up: 6 am   Bed Time: 11 pm    Food Recall-eats 5 [place] kisses and 13 gummy bears a day--no longer eating them everyday more like 3 times a week  Breakfast:8 am: coffee 1/2 tsp sugar/ milk -unmeasured, Bowl of Rice Krispies with half of a cup of milk Occasionally 2 hard boiled eggs, 1/2 cup blueberries or fruit or some rice krispies with milk, cup coffee and Premier protein shake.   Snack:Greek yogurt Chobani or banana or skip if eating out for brunch OR gummy bears ( 20 pieces at a time) [place] balanced breaks or Two good protein yogurt  Lunch: Lunch out to diner 3 -4 times a week. 2 Eggs over easy 2 strips harkins, wheat toast, half home fries, Unsweetened ice tea with lemon. Or [place] food Or homemade chicken salad sandwich with glass of milk. Eating out daily. Asian food/ Mexican soup radha/ pizza/ fish and chips.   Snack:yogurt or banana sometimes  Dinner:Grilled steak + Rice unmeasured + Broccoli OR Pasta with meatballs. With salad.    Snack:skip             Beverages: skim milk, 1% milk 16 ounces, and unsweetened ice tea, water 16 ounces  Volume of beverage intake: 40 ounces at least( 27 ounces of water)    Weekends: Same,  Eat out twice a weekend  Cravings: gummy bears, chocolate  Trouble area of day:after dinner    Frequency of Eating out: 4- 5 times a week with   Food restrictions:n/a  Cooking: self and   Food Shopping: self and     Physical Activity Intake  Activity:Walking 10 minutes + Gym twice a week  Frequency:daily  Physical  limitations/barriers to exercise: knee pain    Estimated Needs  Energy  Lazaro Muñiz Energy Needs: BMR : 1610 calories    1-2# loss weekly sedentary:  932-1432 calories             1-2# loss weekly lightly active:3824-5941 calories  Maintenance calories for sedentary activity level: 1932 calories  Protein:68-85 gms      (1.2-1.5g/kg IBW)  Fluid: 66oz     (35mL/kg IBW)    Nutrition Diagnosis  Yes;    Overweight/obesity  related to Excess energy intake as evidenced by  BMI more than normative standard for age and sex (obesity-grade II 35-39.9)       Nutrition Intervention    Nutrition Prescription  Calories:1200 calories on sedentary day and flex to 1400 calories on walk days  Protein:70-90gm  Fluid:70oz      Meal Plan (Dread/Pro/Carb)  Breakfast: 200-300/20/30  Snack: skip  Lunch: 300/30/30-45  Snack: 150/>5/20  Dinner: 300/30/30-45  Snack: 150/>5/20    Nutrition Education:    Calorie controlled menu  Lean protein food choices  Healthy snack options  Food journaling tips      Nutrition Counseling:  Strategies: meal planning, portion sizes, healthy snack choices, hydration, fiber intake, protein intake, exercise, food journal      Monitoring and Evaluation:  Evaluation criteria:  Energy Intake  Meet protein needs  Maintain adequate hydration  Monitor weekly weight  Meal planning/preparation  Food journal   Decreased portions at mealtimes and snacks  Physical activity     Barriers to learning:none  Readiness to change: Action  Comprehension: very good  Expected Compliance: very good

## 2024-12-11 ENCOUNTER — RESULTS FOLLOW-UP (OUTPATIENT)
Dept: FAMILY MEDICINE CLINIC | Facility: CLINIC | Age: 64
End: 2024-12-11

## 2024-12-11 ENCOUNTER — HOSPITAL ENCOUNTER (OUTPATIENT)
Dept: ULTRASOUND IMAGING | Facility: HOSPITAL | Age: 64
Discharge: HOME | End: 2024-12-11
Attending: FAMILY MEDICINE
Payer: MEDICARE

## 2024-12-11 DIAGNOSIS — M67.442 GANGLION CYST OF FINGER OF LEFT HAND: ICD-10-CM

## 2024-12-11 PROCEDURE — 76882 US LMTD JT/FCL EVL NVASC XTR: CPT

## 2024-12-12 DIAGNOSIS — E89.0 POSTOPERATIVE HYPOTHYROIDISM: ICD-10-CM

## 2024-12-12 RX ORDER — LEVOTHYROXINE SODIUM 175 MCG
TABLET ORAL
Qty: 90 TABLET | Refills: 0 | Status: SHIPPED | OUTPATIENT
Start: 2024-12-12

## 2024-12-13 ENCOUNTER — CLINICAL SUPPORT (OUTPATIENT)
Age: 64
End: 2024-12-13

## 2024-12-13 VITALS — BODY MASS INDEX: 38.92 KG/M2 | WEIGHT: 233.6 LBS | HEIGHT: 65 IN

## 2024-12-13 DIAGNOSIS — R63.5 ABNORMAL WEIGHT GAIN: Primary | ICD-10-CM

## 2024-12-13 PROCEDURE — RECHECK

## 2024-12-24 DIAGNOSIS — I10 ESSENTIAL HYPERTENSION: ICD-10-CM

## 2024-12-24 DIAGNOSIS — E78.00 HYPERCHOLESTEREMIA: ICD-10-CM

## 2024-12-24 RX ORDER — VALSARTAN AND HYDROCHLOROTHIAZIDE 80; 12.5 MG/1; MG/1
1 TABLET, FILM COATED ORAL DAILY
Qty: 90 TABLET | Refills: 1 | Status: SHIPPED | OUTPATIENT
Start: 2024-12-24

## 2024-12-24 RX ORDER — ATORVASTATIN CALCIUM 10 MG/1
10 TABLET, FILM COATED ORAL DAILY
Qty: 90 TABLET | Refills: 1 | Status: SHIPPED | OUTPATIENT
Start: 2024-12-24

## 2025-01-02 ENCOUNTER — HOSPITAL ENCOUNTER (OUTPATIENT)
Dept: RADIOLOGY | Facility: HOSPITAL | Age: 65
Discharge: HOME | End: 2025-01-02
Attending: SURGERY
Payer: MEDICARE

## 2025-01-02 ENCOUNTER — OFFICE VISIT (OUTPATIENT)
Dept: OBGYN CLINIC | Facility: HOSPITAL | Age: 65
End: 2025-01-02
Attending: FAMILY MEDICINE
Payer: MEDICARE

## 2025-01-02 VITALS — HEIGHT: 66 IN | BODY MASS INDEX: 37.61 KG/M2 | WEIGHT: 234 LBS

## 2025-01-02 DIAGNOSIS — M67.40 MUCOID CYST OF JOINT: Primary | ICD-10-CM

## 2025-01-02 DIAGNOSIS — M79.642 LEFT HAND PAIN: ICD-10-CM

## 2025-01-02 DIAGNOSIS — M67.442 GANGLION CYST OF FINGER OF LEFT HAND: ICD-10-CM

## 2025-01-02 PROCEDURE — 73130 X-RAY EXAM OF HAND: CPT

## 2025-01-02 PROCEDURE — 20612 ASPIRATE/INJ GANGLION CYST: CPT | Performed by: SURGERY

## 2025-01-02 PROCEDURE — 99203 OFFICE O/P NEW LOW 30 MIN: CPT | Performed by: SURGERY

## 2025-01-02 RX ORDER — LIDOCAINE HYDROCHLORIDE 10 MG/ML
1 INJECTION, SOLUTION INFILTRATION; PERINEURAL
Status: COMPLETED | OUTPATIENT
Start: 2025-01-02 | End: 2025-01-02

## 2025-01-02 RX ADMIN — LIDOCAINE HYDROCHLORIDE 1 ML: 10 INJECTION, SOLUTION INFILTRATION; PERINEURAL at 12:15

## 2025-01-02 NOTE — PROGRESS NOTES
Lc Mccann M.D.  Attending, Orthopaedic Surgery  Hand, Wrist, and Elbow Surgery  [facility]      ORTHOPAEDIC HAND, WRIST, AND ELBOW OFFICE  VISIT       ASSESSMENT/PLAN:      64 y.o. female with a left thumb mucoid cyst     Left hand x-rays were performed in the office and reviewed. Left thumb US results were reviewed. The etiology of a mucoid cyst was discussed along with treatment options. The decision was made to proceed with a left thumb mucoid cyst aspiration. Left thumb mucoid cyst aspiration was performed in the office without complication. She was placed into a co-ban wrap for compression, to be worn over the next 48 hours. Advised to massage the area over the next few days. We discussed the possibility of surgical excision in the future, which does have an approx. 10-15 percent reoccurrence rate unless she was to undergo a fusion. I will see her back in the office on an as needed basis if symptoms worsen or fail to improve.     The patient verbalized understanding of exam findings and treatment plan. We engaged in the shared decision-making process and treatment options were discussed at length with the patient. Surgical and conservative management discussed today along with risks and benefits.    Diagnoses and all orders for this visit:    Mucoid cyst of joint  -     Hand/upper extremity injection    Left hand pain  -     XR hand 3+ vw left; Future    Ganglion cyst of finger of left hand  -     Ambulatory Referral to Orthopedic Surgery  -     XR hand 3+ vw left; Future      Follow Up:  No follow-ups on file.    To Do Next Visit:  Re-evaluation of current issue      General Discussions:  Osteoarthritis:  The anatomy and physiology of osteoarthritis was discussed with the patient today in the office.  Deterioration of the articular cartilage eventually leads to altered mobility at the joint, resulting in joint subluxation, osteophyte formation, cystic changes, as well as  subchondral sclerosis.  Eventually, pain, limited mobility, and compensatory hypermobility at surrounding joints may develop.  While normal activity and usage of the joint may provide a painful experience to the patient, this typically does not result in damage to the limb.  Treatment options include splints to decreased joint edema, pain, and inflammation.  Therapy exercises to strengthen the surrounding musculature may relieve pain, but do not alter the overall continued development of osteoarthritis.  Oral medications, topical medications, corticosteroid injections may decrease pain and increase overall function.  Eventually, some patients may require surgical intervention.       ____________________________________________________________________________________________________________________________________________      CHIEF COMPLAINT:  Chief Complaint   Patient presents with    Left Hand - Pain     Cyst on the left thumb. Had an US done.        SUBJECTIVE:  Chel Isabel is a 64 y.o. year old RHD female who presents to the office for a left thumb mass. She notes a mass to the radial aspect of her left thumb IP joint. This has been present for approx. 2-3 months and does fluctuate in size. She notes a burning pain to the mass, more so when this is accidentally hit on something. She is not currently having to take anything for pain control.       Pain/symptom timing:  Worse during the day when active  Pain/symptom context:  Worse with activites and work  Pain/symptom modifying factors:  Rest makes better, activities make worse  Pain/symptom associated signs/symptoms: none    Prior treatment   NSAIDsNo   Injections No   Bracing/Orthotics No    Physical Therapy No     I have personally reviewed all the relevant PMH, PSH, SH, FH, Medications and allergies      PAST MEDICAL HISTORY:  Past Medical History:   Diagnosis Date    Arthritis     Asthma     BRCA1 negative     BRCA2 negative     Cancer (HCC)     thyroid cancer     Colon polyp     Disease of thyroid gland     Fibroid     History of COVID-19 01/04/2022    mild cold s/s    Hyperlipidemia     Hypertension     Hypothyroidism     Postmenopausal bleeding 11/23/2021    Varicella        PAST SURGICAL HISTORY:  Past Surgical History:   Procedure Laterality Date    ADENOIDECTOMY      BREAST BIOPSY      BREAST CYST EXCISION Right 1991    Benign    BREAST LUMPECTOMY      CO HYSTEROSCOPY BX ENDOMETRIUM&/POLYPC W/WO D&C N/A 03/07/2022    Procedure: DILATATION AND CURETTAGE (D&C) WITH HYSTEROSCOPY, polypectomy;  Surgeon: Cynthia Copeland MD;  Location: [facility] MAIN OR;  Service: Gynecology    THYROIDECTOMY      TONSILLECTOMY      VARICOSE VEIN SURGERY Bilateral        FAMILY HISTORY:  Family History   Problem Relation Age of Onset    Breast cancer Mother 50    Heart disease Mother     Heart disease Father     Stroke Father     Kidney disease Father     Breast cancer Sister 50    Hypertension Sister     No Known Problems Daughter     No Known Problems Daughter     Diabetes unspecified Maternal Grandmother     Prostate cancer Maternal Grandfather     Diabetes Maternal Aunt     Diabetes Maternal Uncle     Diabetes Paternal Aunt     Breast cancer Cousin 45       SOCIAL HISTORY:  Social History     Tobacco Use    Smoking status: Never     Passive exposure: Never    Smokeless tobacco: Never   Vaping Use    Vaping status: Never Used   Substance Use Topics    Alcohol use: Yes     Alcohol/week: 1.0 standard drink of alcohol     Types: 1 Glasses of wine per week     Comment: Only on holidays    Drug use: Never       MEDICATIONS:    Current Outpatient Medications:     albuterol (Ventolin HFA) 90 mcg/act inhaler, Inhale 2 puffs every 6 (six) hours as needed for wheezing, Disp: 18 g, Rfl: 1    atorvastatin (LIPITOR) 10 mg tablet, take 1 tablet by mouth once daily, Disp: 90 tablet, Rfl: 1    celecoxib (CeleBREX) 200 mg capsule, take 1 capsule by mouth twice a day if needed for moderate pain, Disp: 60  capsule, Rfl: 0    cetirizine (ZyrTEC) 10 mg tablet, as needed  , Disp: , Rfl:     cimetidine (TAGAMET) 400 mg tablet, take 1 tablet by mouth daily at bedtime, Disp: 90 tablet, Rfl: 3    fluticasone (FLONASE) 50 mcg/act nasal spray, 2 sprays into each nostril as needed for rhinitis, Disp: 16 g, Rfl: 3    omeprazole (PriLOSEC) 40 MG capsule, take 1 capsule by mouth once daily, Disp: 90 capsule, Rfl: 1    Synthroid 175 MCG tablet, TAKE 1 TABLET DAILY FOR POSTPROCEDURAL HYPOTHYROIDISM, Disp: 90 tablet, Rfl: 0    traZODone (DESYREL) 50 mg tablet, take 0.5 to 2 tablets by mouth daily at bedtime if needed for sleep, Disp: 180 tablet, Rfl: 1    trospium chloride (SANCTURA) 20 mg tablet, Take 20 mg by mouth 2 (two) times a day, Disp: , Rfl:     valsartan-hydrochlorothiazide (DIOVAN-HCT) 80-12.5 MG per tablet, take 1 tablet by mouth once daily, Disp: 90 tablet, Rfl: 1    ALLERGIES:  Allergies   Allergen Reactions    Avocado (Diagnostic) - Food Allergy Swelling     Tongue swells    Citrullus Vulgaris Swelling     Tongue swells    Pineapple - Food Allergy Swelling     Tongue swells    Sulfa Antibiotics Rash           REVIEW OF SYSTEMS:  Review of Systems   Constitutional:  Negative for chills, fever and unexpected weight change.   HENT:  Negative for hearing loss, nosebleeds and sore throat.    Eyes:  Negative for pain, redness and visual disturbance.   Respiratory:  Negative for cough, shortness of breath and wheezing.    Cardiovascular:  Negative for chest pain, palpitations and leg swelling.   Gastrointestinal:  Negative for abdominal pain, nausea and vomiting.   Endocrine: Negative for polydipsia and polyuria.   Genitourinary:  Negative for difficulty urinating and hematuria.   Musculoskeletal:  Negative for arthralgias, joint swelling and myalgias.   Skin:  Negative for rash and wound.   Neurological:  Negative for dizziness, numbness and headaches.   Psychiatric/Behavioral:  Negative for decreased concentration,  dysphoric mood and suicidal ideas. The patient is not nervous/anxious.        VITALS:  There were no vitals filed for this visit.    LABS:      _____________________________________________________  PHYSICAL EXAMINATION:  General: well developed and well nourished, alert, oriented times 3, and appears comfortable  Psychiatric: Normal  HEENT: Normocephalic, Atraumatic Trachea Midline, No torticollis  Pulmonary: No audible wheezing or respiratory distress   Abdomen/GI: Non tender, non distended   Cardiovascular: No pitting edema, 2+ radial pulse   Skin: No erythema, lacerations, fluctation, ulcerations  Neurovascular: Sensation Intact to the Median, Ulnar, Radial Nerve, Motor Intact to the Median, Ulnar, Radial Nerve, and Pulses Intact  Musculoskeletal: Normal, except as noted in detailed exam and in HPI.      MUSCULOSKELETAL EXAMINATION:    Left thumb:  Moderate IP joint deformity  Overlying 10 x 8mm cyst overlying the ip joint  No TTP joint line   TTP cyst    ___________________________________________________  STUDIES REVIEWED:  I have personally reviewed and interpreted  AP lateral and oblique radiographs of left hand which demonstrate diffuse arthritic changes, more so to the DIP and PIP joints.      I have personally reviewed and interpreted  US of left thumb which demonstrate a cyst.      LABS REVIEWED:    HgA1c:   Lab Results   Component Value Date    HGBA1C 5.8 (H) 08/07/2024     BMP:   Lab Results   Component Value Date    CALCIUM 9.8 08/18/2023    K 4.2 08/07/2024    CO2 25 08/07/2024     08/07/2024    BUN 20 08/07/2024    CREATININE 0.91 08/07/2024               PROCEDURES PERFORMED:  Hand/upper extremity injection  Universal Protocol:  procedure performed by consultantConsent: Verbal consent obtained. Written consent not obtained.  Risks and benefits: risks, benefits and alternatives were discussed  Consent given by: patient  Patient understanding: patient states understanding of the procedure being  performed  Patient identity confirmed: verbally with patient  Procedure Details  Condition comment: left thumb IP joint mucoid cyst   Needle size: 25 G  Ultrasound guidance: no  Medications administered: 1 mL lidocaine 1 %  Aspirate amount (ml): scant.  Aspirate: clear and blood-tinged  Patient tolerance: patient tolerated the procedure well with no immediate complications  Dressing:  Sterile dressing applied              _____________________________________________________      Scribe Attestation      I,:  Miley Golden MA am acting as a scribe while in the presence of the attending physician.:       I,:  Lc Mccann MD personally performed the services described in this documentation    as scribed in my presence.:

## 2025-01-16 ENCOUNTER — OFFICE VISIT (OUTPATIENT)
Dept: FAMILY MEDICINE CLINIC | Facility: CLINIC | Age: 65
End: 2025-01-16
Payer: MEDICARE

## 2025-01-16 ENCOUNTER — APPOINTMENT (OUTPATIENT)
Dept: RADIOLOGY | Age: 65
End: 2025-01-16
Payer: MEDICARE

## 2025-01-16 VITALS
DIASTOLIC BLOOD PRESSURE: 88 MMHG | OXYGEN SATURATION: 99 % | BODY MASS INDEX: 37.69 KG/M2 | SYSTOLIC BLOOD PRESSURE: 130 MMHG | HEIGHT: 66 IN | HEART RATE: 93 BPM | WEIGHT: 234.5 LBS | TEMPERATURE: 97.5 F

## 2025-01-16 DIAGNOSIS — M54.41 CHRONIC BILATERAL LOW BACK PAIN WITH BILATERAL SCIATICA: Primary | ICD-10-CM

## 2025-01-16 DIAGNOSIS — M54.42 CHRONIC BILATERAL LOW BACK PAIN WITH BILATERAL SCIATICA: Primary | ICD-10-CM

## 2025-01-16 DIAGNOSIS — G89.29 CHRONIC BILATERAL LOW BACK PAIN WITH BILATERAL SCIATICA: ICD-10-CM

## 2025-01-16 DIAGNOSIS — M54.41 CHRONIC BILATERAL LOW BACK PAIN WITH BILATERAL SCIATICA: ICD-10-CM

## 2025-01-16 DIAGNOSIS — G89.29 CHRONIC BILATERAL LOW BACK PAIN WITH BILATERAL SCIATICA: Primary | ICD-10-CM

## 2025-01-16 DIAGNOSIS — M54.42 CHRONIC BILATERAL LOW BACK PAIN WITH BILATERAL SCIATICA: ICD-10-CM

## 2025-01-16 PROCEDURE — 99213 OFFICE O/P EST LOW 20 MIN: CPT | Performed by: FAMILY MEDICINE

## 2025-01-16 PROCEDURE — 72110 X-RAY EXAM L-2 SPINE 4/>VWS: CPT

## 2025-01-16 RX ORDER — PREGABALIN 50 MG/1
50 CAPSULE ORAL 3 TIMES DAILY
Qty: 30 CAPSULE | Refills: 1 | Status: SHIPPED | OUTPATIENT
Start: 2025-01-16

## 2025-01-16 NOTE — PROGRESS NOTES
Name: Chel Isabel      : 1960      MRN: [id number]  Encounter Provider: Megan Garcia MD  Encounter Date: 2025   Encounter department: [facility]  :  Assessment & Plan  Chronic bilateral low back pain with bilateral sciatica  She already completed PT  course of treatment this 2024 with modest improvement in pain.  Obtain XR followed by MRI  Trial of Lyrica (previously had eye pain triggered by gabapentin use)  Discussed possible adverse effects of new medication and to call with any concerns.     Orders:  •  XR spine lumbar minimum 4 views non injury; Future  •  MRI lumbar spine wo contrast; Future  •  Ambulatory Referral to Physical Therapy; Future  •  pregabalin (LYRICA) 50 mg capsule; Take 1 capsule (50 mg total) by mouth 3 (three) times a day           History of Present Illness   Chief Complaint   Patient presents with   • Back Pain     Let sided back pain, knee pain both. Right leg calf down throbbing leg numb. Has been ongoing last two to three weeks have been worse      HPI Patient reports she has chronic pain in her lower back and knees. She has radiating pain in the right calf that get numb. Notes the pain is mostly on the left side of her back but that it radiates into both buttocks and down both legs with associated numbness and tingling. Denies loss of bowel/bladder function. Her quality of life is quite poor because of her pain. At times she has to lean forward to alleviate her pain. Most OTC things only help temporarily or not at all.     Review of Systems   Constitutional:  Negative for chills and fever.   HENT:  Negative for congestion and sore throat.    Eyes:  Negative for pain and visual disturbance.   Respiratory:  Negative for cough and shortness of breath.    Cardiovascular:  Negative for chest pain and palpitations.   Gastrointestinal:  Negative for abdominal pain and nausea.   Genitourinary:  Negative for dysuria.   Musculoskeletal:   "Positive for arthralgias, back pain and myalgias.   Skin:  Negative for rash and wound.   Neurological:  Positive for numbness. Negative for dizziness and headaches.   All other systems reviewed and are negative.      Objective   /88   Pulse 93   Temp 97.5 °F (36.4 °C)   Ht 5' 6\" (1.676 m)   Wt 106 kg (234 lb 8 oz)   SpO2 99%   BMI 37.85 kg/m²      Physical Exam  Vitals and nursing note reviewed.   Constitutional:       General: She is not in acute distress.     Appearance: She is well-developed.   HENT:      Head: Normocephalic and atraumatic.      Right Ear: External ear normal.      Left Ear: External ear normal.      Nose: Nose normal.   Eyes:      Conjunctiva/sclera: Conjunctivae normal.   Neck:      Trachea: No tracheal deviation.   Pulmonary:      Effort: Pulmonary effort is normal.   Abdominal:      Tenderness: There is no abdominal tenderness.   Musculoskeletal:      Lumbar back: No tenderness or bony tenderness. Positive right straight leg raise test and positive left straight leg raise test.   Skin:     General: Skin is warm and dry.      Capillary Refill: Capillary refill takes less than 2 seconds.      Findings: No rash.   Neurological:      Mental Status: She is alert.      Cranial Nerves: No cranial nerve deficit.       "

## 2025-01-17 ENCOUNTER — RESULTS FOLLOW-UP (OUTPATIENT)
Dept: FAMILY MEDICINE CLINIC | Facility: CLINIC | Age: 65
End: 2025-01-17

## 2025-01-17 ENCOUNTER — CLINICAL SUPPORT (OUTPATIENT)
Age: 65
End: 2025-01-17

## 2025-01-17 VITALS — BODY MASS INDEX: 38.92 KG/M2 | HEIGHT: 65 IN | WEIGHT: 233.6 LBS

## 2025-01-17 DIAGNOSIS — G89.29 CHRONIC BILATERAL LOW BACK PAIN WITH BILATERAL SCIATICA: Primary | ICD-10-CM

## 2025-01-17 DIAGNOSIS — R63.5 ABNORMAL WEIGHT GAIN: Primary | ICD-10-CM

## 2025-01-17 DIAGNOSIS — M54.42 CHRONIC BILATERAL LOW BACK PAIN WITH BILATERAL SCIATICA: Primary | ICD-10-CM

## 2025-01-17 DIAGNOSIS — M54.41 CHRONIC BILATERAL LOW BACK PAIN WITH BILATERAL SCIATICA: Primary | ICD-10-CM

## 2025-01-17 PROCEDURE — RECHECK

## 2025-01-17 NOTE — PROGRESS NOTES
Weight Management Medical Nutrition Assessment   Chel presented for a 3/3 bundle meal planning session. Today's weight is 233.6# stable weight since last visit loss of 3.2% TBW.  Hx of HLD, HTN and Prediabetes( recent HgA1C  5.8 mg/dl 08/07/2024)  Has shrunk in height now 5 foot 5 inches tall has a DEXA scan done this past July 2024 but states it was normal. Pt is enjoying USP with her  and states eating out around 4 times a week. States a desire to lose weight to reduce her medications and recent prediabetes diagnosis.     Per dietary recall patient consumes excess calories from calorie dense meals when dining out frequently and snacking on sweets in the afternoon. Diet is inadequate in protein. Patient finds it difficult to eat foods that are higher in protein as she enjoys carbohydrates. Pt is not open to tracking meals or calories at this time. Educated on the Myplate method and the importance of balancing protein and fiber to ensure that lean muscle mass is maintained with aging. Increased physical activity to 3 gym sessions a week.  Increasing her protein intake  and using protein shakes/protein balanced snacks 1 time a day in between meals. Has decreased candy intake substantially and only consuming around 2-3 times a week. States last few weeks have been difficult for her as she has family over and watching her 5 year old grandson. Says she is eating more out with and consuming more comfort foods such as fries. Reviewed portion sizes and foods she is eating when dining out.Developed and reviewed a low calorie meal plan. Hydration adequate. Will be busy with family visiting but is interested in another bundle. Will continue to follow.    Completed a body composition using SECA scale and reviewed results with patient.   Goals:  1) Continue gym routine to three times a week.   2)Start adding in 1-2 servings of vegetable at dinner time  3) Making 1 snack choice balanced with protein and fiber      Patient seen by Medical Provider in past 6 months:  no  Requested to schedule appointment with Medical Provider: No      Anthropometric Measurements  Start Weight (#): 241.2# (2022)  Current Weight (#): 233.6#  TBW % Change from start weight:3%  Ideal Body Weight (#):125#  Goal Weight (#):ST# LTG : 180#   Highest: 250#  Lowest: not obtained    Weight Loss History  Previous weight loss attempts: Nutrition Counseling with RD, Previous weight loss attempts: Self Created Diets (Portion Control, Healthy Food Choices, etc.)   Bicycle and Walking    Food and Nutrition Related History  Wake up: 6 am   Bed Time: 11 pm    Food Recall-eats 5 Lanie kisses and 13 gummy bears a day--no longer eating them everyday more like 3 times a week  Breakfast:8 am: coffee 1/2 tsp sugar/ milk -unmeasured, Bowl of Rice Krispies with half of a cup of milk Occasionally 2 hard boiled eggs, banana/grapesor fruit or some rice krispies with milk, cup coffee and Premier protein shake.   Snack:Greek yogurt Chobani or banana or skip if eating out for brunch OR gummy bears ( 20 pieces at a time) Avis balanced breaks or Two good protein yogurt  Lunch: Lunch out to diner 3 -4 times a week. 2 Eggs over easy 2 strips harkins, wheat toast, half home fries, Unsweetened ice tea with lemon. Or Nigerien food Or homemade chicken salad sandwich with glass of milk. Eating out daily. Asian food/ Ukrainian soup radha/ pizza/ fish and chips. Or Cold cuts + mozzarella cheese on croissant  Snack:yogurt or banana sometimes or handful nuts  Dinner:Grilled steak + Rice unmeasured + Broccoli OR Pasta with meatballs. With salad.    Snack:skip             Beverages: skim milk, 1% milk 16 ounces, and unsweetened ice tea, water 16 ounces  Volume of beverage intake: 40 ounces at least( 27 ounces of water)    Weekends: Same,  Eat out twice a weekend  Cravings: gummy bears, chocolate  Trouble area of day:after dinner    Frequency of Eating out: 4- 5 times a  week with   Food restrictions:n/a  Cooking: self and   Food Shopping: self and     Physical Activity Intake  Activity:Walking 10 minutes + Gym twice a week  Frequency:daily  Physical limitations/barriers to exercise: knee pain    Estimated Needs  Energy  Lazaro Muñiz Energy Needs: BMR : 1610 calories    1-2# loss weekly sedentary:  932-1432 calories             1-2# loss weekly lightly active:3787-1629 calories  Maintenance calories for sedentary activity level: 1932 calories  Protein:68-85 gms      (1.2-1.5g/kg IBW)  Fluid: 66oz     (35mL/kg IBW)    Nutrition Diagnosis  Yes;    Overweight/obesity  related to Excess energy intake as evidenced by  BMI more than normative standard for age and sex (obesity-grade II 35-39.9)       Nutrition Intervention    Nutrition Prescription  Calories:1200 calories on sedentary day and flex to 1400 calories on walk days  Protein:70-90gm  Fluid:70oz      Meal Plan (Dread/Pro/Carb)  Breakfast: 200-300/20/30  Snack: skip  Lunch: 300/30/30-45  Snack: 150/>5/20  Dinner: 300/30/30-45  Snack: 150/>5/20    Nutrition Education:    Calorie controlled menu  Lean protein food choices  Healthy snack options  Food journaling tips      Nutrition Counseling:  Strategies: meal planning, portion sizes, healthy snack choices, hydration, fiber intake, protein intake, exercise, food journal      Monitoring and Evaluation:  Evaluation criteria:  Energy Intake  Meet protein needs  Maintain adequate hydration  Monitor weekly weight  Meal planning/preparation  Food journal   Decreased portions at mealtimes and snacks  Physical activity     Barriers to learning:none  Readiness to change: Action  Comprehension: very good  Expected Compliance: very good

## 2025-01-21 ENCOUNTER — EVALUATION (OUTPATIENT)
Dept: PHYSICAL THERAPY | Age: 65
End: 2025-01-21
Payer: MEDICARE

## 2025-01-21 DIAGNOSIS — M54.41 CHRONIC BILATERAL LOW BACK PAIN WITH BILATERAL SCIATICA: ICD-10-CM

## 2025-01-21 DIAGNOSIS — M54.42 CHRONIC BILATERAL LOW BACK PAIN WITH BILATERAL SCIATICA: ICD-10-CM

## 2025-01-21 DIAGNOSIS — G89.29 CHRONIC BILATERAL LOW BACK PAIN WITH BILATERAL SCIATICA: ICD-10-CM

## 2025-01-21 PROCEDURE — 97161 PT EVAL LOW COMPLEX 20 MIN: CPT

## 2025-01-21 PROCEDURE — 97110 THERAPEUTIC EXERCISES: CPT

## 2025-01-21 NOTE — PROGRESS NOTES
PT Evaluation     Today's date: 2025  Patient name: Chel Isabel  : 1960  MRN: [id number]  Referring provider: Hema Garcia*  Dx:   Encounter Diagnosis     ICD-10-CM    1. Chronic bilateral low back pain with bilateral sciatica  M54.42 Ambulatory Referral to Physical Therapy    M54.41     G89.29                      Assessment  Impairments: abnormal gait, abnormal or restricted ROM, impaired balance, impaired physical strength, lacks appropriate home exercise program, pain with function, weight-bearing intolerance and poor posture   Symptom irritability: moderate    Assessment details: Pt is a pleasant 64 y.o. female who presents to OP PT with chief c/o chronic LBP with radiating symptoms into LE's. Signs and symptoms indicate probable diagnosis of spinal stenosis vs lumbar radiculopathy vs mechanical low back pain. she has primary impairments of decreased lumbar ROM/mobility, decreased LE strength, decreased core/trunk strength, poor postural control, abnormal gait, and increased pain. She is limited functionally as she has difficulty with prolonged standing, prolonged ambulation, prolonged sitting, transitional movements, lifting/carrying objects, performing ADL's, and participating in usual leisure activities. Her symptoms were able to centralize with b/l LAD to simulate lumbar traction and centralized with repeated lumbar flexion in sitting. Provided pt with HEP for lumbar flexion and educated her on proper completion of HEP, normal response to exercises, diagnosis, activity modifications, and POC. she verbalized understanding of all education provided. All questions answered. Pt would benefit from skilled OP PT in order to improve upon impairments and return to PLOF.  Understanding of Dx/Px/POC: good     Prognosis: good    Goals  Short term goals  Pt will improve strength by 1/2 grade in order to perform ADL's within 2-3 weeks   Pt will be able to bend down to  object off of the  floor with a 50% reduction in symptoms within 2-3 weeks  Pt will be able to stand for >30 minutes with a 50% reduction in symptoms within 2-3 weeks    Long term goals  Pt will be I with advanced HEP or symptom management   Pt will be able to perform all ADL's with <3/10 pain  Pt will report 90% functional improvement  Pt will be able to tolerate all leisure activities (pushing/pulling/lifting) with <3/10 pain  Pt will be able to tolerate prolonged sitting/standing/walking with <3/10 pain  Pt will improve FOTO >/= expected         Plan  Patient would benefit from: PT eval and skilled physical therapy  Planned modality interventions: traction    Planned therapy interventions: patient education, therapeutic exercise, graded exercise, functional ROM exercises, flexibility, home exercise program, manual therapy, activity modification, strengthening, stretching, joint mobilization, massage, therapeutic activities, neuromuscular re-education, postural training and balance/weight bearing training    Frequency: 2x week  Duration in weeks: 6  Treatment plan discussed with: patient        Subjective Evaluation    History of Present Illness  Mechanism of injury: Pt reports that she has a constant pain in her lower back mostly on L side. It is a throbbing type pain. Feels like someone is hitting her with something. Pt reports that she has not had any injury. She reports that she just has arthritis and spinal stenosis. Pain goes down her leg and her R foot goes numb. Whole foot goes numb. She follows up with her PCP with this issue. She does have an MRI scheduled for lumbar spine in February. She has had PT in July in [place]. She did find the hip strengthening exercises to be helpful because she has weak hips. She leans to her R side when she gets out of bed. After a while she is able to straighten up a little bit. She does have arthritis in her knees which plays a factor.     Agg factors: standing, getting up from a chair,  prolonged sitting  Relieving factors: Sleeping (does wake up with pain)  Patient Goals  Patient goals for therapy: decreased pain, independence with ADLs/IADLs and return to sport/leisure activities  Patient goal: Be able to walk straight; alleviate some of the pain  Pain  Current pain ratin  At best pain ratin  At worst pain rating: 10  Quality: throbbing and dull ache  Aggravating factors: nothing, sitting, stair climbing, walking and standing    Treatments  Previous treatment: physical therapy  Current treatment: physical therapy        Objective     Active Range of Motion     Lumbar   Flexion:  Restriction level: minimal  Extension:  Restriction level: moderate  Left lateral flexion:  with pain Restriction level: moderate  Right lateral flexion:  Restriction level: moderate  Left rotation:  with pain Restriction level: moderate  Right rotation:  Restriction level: moderate  Mechanical Assessment    Cervical      Thoracic      Lumbar    Sitting flexion: repeated movements  Pain location: centralized  Pain intensity: better  Pain level: decreased  Standing extension: repeated movements  Pain location: peripheralized  Pain intensity: better  Pain level: produced  Lying extension: sustained positions  Pain location: peripheralized  Pain intensity: worse  Pain level: produced    Strength/Myotome Testing     Left Hip   Planes of Motion   Flexion: 4-  Extension: 4-  Abduction: 4-  Adduction: 4-    Right Hip   Planes of Motion   Flexion: 4-  Extension: 4-  Abduction: 4-  Adduction: 4-    Left Knee   Flexion: 4-  Extension: 4-    Right Knee   Flexion: 4-  Extension: 4-    Left Ankle/Foot   Dorsiflexion: WFL.   Plantar flexion: WFL.     Right Ankle/Foot   Dorsiflexion: WFL.   Plantar flexion: WFL.     Tests     Lumbar     Left   Negative crossed SLR, passive SLR and slump test.     Right   Negative crossed SLR, passive SLR and slump test.     Left Hip   Positive MICK.   Negative FADIR.     Right Hip   Positive  MICK.   Negative FADIR.              Precautions: Chronic b/l knee pain, Chronic LBP, B/l hip pain, Hx of thyroidectomy, Venous insufficiency of LE's      Manuals 1/21            LAD grade IV BLE Assessed (Symptoms centralized) Traction                                                  Neuro Re-Ed                                                                                                        Ther Ex             Lumbar flex sitting  HEP            LTR HEP            Bike warm up for LE ROM             PPT             PPT w/ marching             PPT w/ SLR flex             Hip add iso             Hip abd TB             TA ball press down             Paloff press             Rows/ext desiree             Dead bugs             [place] ball march             Cat/camel?                          Ther Activity                                       Gait Training                                       Modalities             Lumbar traction  *

## 2025-01-27 ENCOUNTER — OFFICE VISIT (OUTPATIENT)
Dept: OBGYN CLINIC | Facility: HOSPITAL | Age: 65
End: 2025-01-27
Payer: MEDICARE

## 2025-01-27 VITALS — BODY MASS INDEX: 38.82 KG/M2 | WEIGHT: 233 LBS | HEIGHT: 65 IN

## 2025-01-27 DIAGNOSIS — Z01.812 PRE-PROCEDURE LAB EXAM: ICD-10-CM

## 2025-01-27 DIAGNOSIS — M67.40 MUCOID CYST OF JOINT: Primary | ICD-10-CM

## 2025-01-27 PROCEDURE — 99214 OFFICE O/P EST MOD 30 MIN: CPT | Performed by: SURGERY

## 2025-01-27 RX ORDER — CEFAZOLIN SODIUM 2 G/50ML
2000 SOLUTION INTRAVENOUS ONCE
OUTPATIENT
Start: 2025-01-27 | End: 2025-01-27

## 2025-01-27 RX ORDER — CHLORHEXIDINE GLUCONATE ORAL RINSE 1.2 MG/ML
15 SOLUTION DENTAL ONCE
OUTPATIENT
Start: 2025-01-27 | End: 2025-01-27

## 2025-01-27 NOTE — H&P
ASSESSMENT/PLAN:      Assessment & Plan    Discussed with patient updated presentation is consistent with recurrence of mucoid cyst of left thumb IP joint   Patient elects to move forward with surgical  intervention via mass excision left thumb IP joint with arthrotomy and synovectomy.   Procedure, prognosis, benefits, and risks were discussed in great detail. Patient expresses understanding and detailed consent was obtained.   Patient will be seen 10-14 days postop for reevaluation.   Patient expresses understanding and is in agreement with this treatment plan.      The patient verbalized understanding of exam findings and treatment plan. We engaged in the shared decision-making process and treatment options were discussed at length with the patient. Surgical and conservative management discussed today along with risks and benefits.    Diagnoses and all orders for this visit:    Mucoid cyst of joint  Comments:  IP joint left thumb  Orders:  -     Case request operating room: EXCISION BIOPSY TISSUE LESION/MASS UPPER EXTREMITY, IP arthrotomy + synovectomy; Standing  -     Basic metabolic panel; Future  -     EKG 12 lead; Future  -     Case request operating room: EXCISION BIOPSY TISSUE LESION/MASS UPPER EXTREMITY, IP arthrotomy + synovectomy    Pre-procedure lab exam  -     Basic metabolic panel; Future  -     EKG 12 lead; Future    Other orders  -     Nursing Communication Warmimg Interventions Implemented; Standing  -     Nursing Communication CHG bath, have staff wash entire body (neck down) per pre-op bathing protocol. Routine, evening prior to, and day of surgery.; Standing  -     Nursing Communication Swab both nares with Povidone-Iodine solution, EXCLUDE if patient has shellfish/Iodine allergy, and replace with nasal alcohol swabstick. Routine, day of surgery, on call to OR; Standing  -     chlorhexidine (PERIDEX) 0.12 % oral rinse 15 mL  -     Void on call to OR; Standing  -     Insert peripheral IV;  Standing  -     Diet NPO; Sips with meds; Standing  -     Apply Sequential Compression Device; Standing  -     ceFAZolin (ANCEF) IVPB (premix in dextrose) 2,000 mg 50 mL          Ganglion Cyst Excision: The anatomy and physiology of the ganglion was discussed with the patient today in the office.  Fluid leaking out of the joint surface typically creates a small sac, which can enlarge and cause pain or limitation of motion.  Treatment options include observation, aspiration, or surgical incision were discussed with the patient today.  Observation typically lead to resolution and approximately 10% of patients, aspiration least resolution approximately 50% of patients, and surgical excision lead to resolution in approximately 97% of patients.  After discussion with the patient today, the patient voiced understanding of all treatment options.The patient has elected excision of the ganglion.The risks and benefits of the procedure were explained to the patient, which include, but are not limited to: Bleeding, infection, recurrence, pain, scar, damage to tendons, damage to nerves, and damage to blood vessels, failure to give desired results and complications related to anesthesia.  These risks, along with alternative conservative treatment options, and postoperative protocols were voiced back and understood by the patient.  All questions were answered to the patient's satisfaction.  The patient agrees to comply with a standard postoperative protocol, and is willing to proceed.  Education was provided via written and auditory forms.  There were no barriers to learning. Written handouts regarding wound care, incision and scar care, and general preoperative information was provided to the patient.  Prior to surgery, the patient may be requested to stop all anti-inflammatory medications.  Prophylactic aspirin, Plavix, and Coumadin may be allowed to be continued.  Medications including vitamin E., ginkgo, and fish oil are  requested to be stopped approximately one week prior to surgery.  Hypertensive medications and beta blockers, if taken, should be continued.    Follow Up:  Return 10-14 days post op, for Post-op evaluation.      To Do Next Visit:  Re-evaluation of current issue and Sutures out    ____________________________________________________________________________________________________________________________________________      CHIEF COMPLAINT:  Chief Complaint   Patient presents with    Follow-up     Patient had a cyst drained on 1/2/25 patients cyst is back       SUBJECTIVE:  Chel Isabel is a 64 y.o. year old RHD female who presents for follow-up evaluation of mucoid cyst of left thumb. She was last seen regresses issue on 1/2/2025, at which time she was provided with an aspiration. She states that she kept a compressive wrap intact as instructed, but the cyst returned after approximately 1 week. On today's presentation she continues to report swelling and burning along the radial aspect of the left thumb with direct pressure. She denies any numbness or tingling. She denies any decreased function, except for pain inhibition. She expresses today that she would like to move forward with surgical intervention to have it removed.       I have personally reviewed all the relevant PMH, PSH, SH, FH, Medications and allergies.     PAST MEDICAL HISTORY:  Past Medical History:   Diagnosis Date    Arthritis     Asthma     BRCA1 negative     BRCA2 negative     Cancer (HCC)     thyroid cancer    Colon polyp     Disease of thyroid gland     Fibroid     History of COVID-19 01/04/2022    mild cold s/s    Hyperlipidemia     Hypertension     Hypothyroidism     Postmenopausal bleeding 11/23/2021    Varicella        PAST SURGICAL HISTORY:  Past Surgical History:   Procedure Laterality Date    ADENOIDECTOMY      BREAST BIOPSY      BREAST CYST EXCISION Right 1991    Benign    BREAST LUMPECTOMY      OK HYSTEROSCOPY BX ENDOMETRIUM&/POLYPC W/WO  D&C N/A 03/07/2022    Procedure: DILATATION AND CURETTAGE (D&C) WITH HYSTEROSCOPY, polypectomy;  Surgeon: Cynthia Copeland MD;  Location: [facility] MAIN OR;  Service: Gynecology    THYROIDECTOMY      TONSILLECTOMY      VARICOSE VEIN SURGERY Bilateral        FAMILY HISTORY:  Family History   Problem Relation Age of Onset    Breast cancer Mother 50    Heart disease Mother     Heart disease Father     Stroke Father     Kidney disease Father     Breast cancer Sister 50    Hypertension Sister     No Known Problems Daughter     No Known Problems Daughter     Diabetes unspecified Maternal Grandmother     Prostate cancer Maternal Grandfather     Diabetes Maternal Aunt     Diabetes Maternal Uncle     Diabetes Paternal Aunt     Breast cancer Cousin 45       SOCIAL HISTORY:  Social History     Tobacco Use    Smoking status: Never     Passive exposure: Never    Smokeless tobacco: Never   Vaping Use    Vaping status: Never Used   Substance Use Topics    Alcohol use: Yes     Alcohol/week: 1.0 standard drink of alcohol     Types: 1 Glasses of wine per week     Comment: Only on holidays    Drug use: Never       MEDICATIONS:    Current Outpatient Medications:     albuterol (Ventolin HFA) 90 mcg/act inhaler, Inhale 2 puffs every 6 (six) hours as needed for wheezing, Disp: 18 g, Rfl: 1    atorvastatin (LIPITOR) 10 mg tablet, take 1 tablet by mouth once daily, Disp: 90 tablet, Rfl: 1    celecoxib (CeleBREX) 200 mg capsule, take 1 capsule by mouth twice a day if needed for moderate pain, Disp: 60 capsule, Rfl: 0    cetirizine (ZyrTEC) 10 mg tablet, as needed  , Disp: , Rfl:     cimetidine (TAGAMET) 400 mg tablet, take 1 tablet by mouth daily at bedtime, Disp: 90 tablet, Rfl: 3    fluticasone (FLONASE) 50 mcg/act nasal spray, 2 sprays into each nostril as needed for rhinitis, Disp: 16 g, Rfl: 3    omeprazole (PriLOSEC) 40 MG capsule, take 1 capsule by mouth once daily, Disp: 90 capsule, Rfl: 1    pregabalin (LYRICA) 50 mg capsule, Take 1  capsule (50 mg total) by mouth 3 (three) times a day, Disp: 30 capsule, Rfl: 1    Synthroid 175 MCG tablet, TAKE 1 TABLET DAILY FOR POSTPROCEDURAL HYPOTHYROIDISM, Disp: 90 tablet, Rfl: 0    traZODone (DESYREL) 50 mg tablet, take 0.5 to 2 tablets by mouth daily at bedtime if needed for sleep, Disp: 180 tablet, Rfl: 1    trospium chloride (SANCTURA) 20 mg tablet, Take 20 mg by mouth 2 (two) times a day, Disp: , Rfl:     valsartan-hydrochlorothiazide (DIOVAN-HCT) 80-12.5 MG per tablet, take 1 tablet by mouth once daily, Disp: 90 tablet, Rfl: 1    ALLERGIES:  Allergies   Allergen Reactions    Avocado (Diagnostic) - Food Allergy Swelling     Tongue swells    Citrullus Vulgaris Swelling     Tongue swells    Pineapple - Food Allergy Swelling     Tongue swells    Sulfa Antibiotics Rash       REVIEW OF SYSTEMS:  Review of Systems   Constitutional:  Negative for chills, fever and unexpected weight change.   HENT:  Negative for hearing loss, nosebleeds and sore throat.    Eyes:  Negative for pain, redness and visual disturbance.   Respiratory:  Negative for cough, shortness of breath and wheezing.    Cardiovascular:  Negative for chest pain, palpitations and leg swelling.   Gastrointestinal:  Negative for abdominal pain, nausea and vomiting.   Endocrine: Negative for polydipsia and polyuria.   Genitourinary:  Negative for dysuria and hematuria.   Musculoskeletal:         As noted in HPI   Skin:  Negative for rash and wound.   Neurological:  Negative for dizziness, numbness and headaches.   Psychiatric/Behavioral:  Negative for decreased concentration and suicidal ideas. The patient is not nervous/anxious.        VITALS:  There were no vitals filed for this visit.      _____________________________________________________  PHYSICAL EXAMINATION:  General: well developed and well nourished, alert, oriented times 3, and appears comfortable  Psychiatric: Normal  HEENT: Normocephalic, Atraumatic Trachea Midline, No  torticollis  Pulmonary: No audible wheezing or respiratory distress   Cardiovascular: No pitting edema, 2+ radial pulse   Abdominal/GI: abdomen non tender, non distended   Skin: No erythema, lacerations, fluctation, ulcerations -small mass radial aspect dorsal left thumb IP joint  Neurovascular: Sensation Intact to the Median, Ulnar, Radial Nerve, Motor Intact to the Median, Ulnar, Radial Nerve, and Pulses Intact  Musculoskeletal: Normal, except as noted in detailed exam and in HPI.      MUSCULOSKELETAL EXAMINATION:  8 x 14 mm mass dorsal thumb joint  Mild TTP at mass   Compressible  IP joint flexion somewhat limited    ___________________________________________________    STUDIES REVIEWED:  No new imaging reviewed this visit    LABS REVIEWED:    HgA1c:   Lab Results   Component Value Date    HGBA1C 5.8 (H) 08/07/2024     BMP:   Lab Results   Component Value Date    CALCIUM 9.8 08/18/2023    K 4.2 08/07/2024    CO2 25 08/07/2024     08/07/2024    BUN 20 08/07/2024    CREATININE 0.91 08/07/2024         PROCEDURES PERFORMED:  Procedures  No Procedures performed today    _____________________________________________________      Scribe Attestation      I,:  Vazquez Gonzalez am acting as a scribe while in the presence of the attending physician.:       I,:  Lc Mccann MD personally performed the services described in this documentation    as scribed in my presence.:

## 2025-01-27 NOTE — PROGRESS NOTES
ASSESSMENT/PLAN:      Assessment & Plan    Discussed with patient updated presentation is consistent with recurrence of mucoid cyst of left thumb IP joint   Patient elects to move forward with surgical  intervention via mass excision left thumb IP joint with arthrotomy and synovectomy.   Procedure, prognosis, benefits, and risks were discussed in great detail. Patient expresses understanding and detailed consent was obtained.   Patient will be seen 10-14 days postop for reevaluation.   Patient expresses understanding and is in agreement with this treatment plan.      The patient verbalized understanding of exam findings and treatment plan. We engaged in the shared decision-making process and treatment options were discussed at length with the patient. Surgical and conservative management discussed today along with risks and benefits.    Diagnoses and all orders for this visit:    Mucoid cyst of joint  Comments:  IP joint left thumb  Orders:  -     Case request operating room: EXCISION BIOPSY TISSUE LESION/MASS UPPER EXTREMITY, IP arthrotomy + synovectomy; Standing  -     Basic metabolic panel; Future  -     EKG 12 lead; Future  -     Case request operating room: EXCISION BIOPSY TISSUE LESION/MASS UPPER EXTREMITY, IP arthrotomy + synovectomy    Pre-procedure lab exam  -     Basic metabolic panel; Future  -     EKG 12 lead; Future    Other orders  -     Nursing Communication Warmimg Interventions Implemented; Standing  -     Nursing Communication CHG bath, have staff wash entire body (neck down) per pre-op bathing protocol. Routine, evening prior to, and day of surgery.; Standing  -     Nursing Communication Swab both nares with Povidone-Iodine solution, EXCLUDE if patient has shellfish/Iodine allergy, and replace with nasal alcohol swabstick. Routine, day of surgery, on call to OR; Standing  -     chlorhexidine (PERIDEX) 0.12 % oral rinse 15 mL  -     Void on call to OR; Standing  -     Insert peripheral IV;  Standing  -     Diet NPO; Sips with meds; Standing  -     Apply Sequential Compression Device; Standing  -     ceFAZolin (ANCEF) IVPB (premix in dextrose) 2,000 mg 50 mL          Ganglion Cyst Excision: The anatomy and physiology of the ganglion was discussed with the patient today in the office.  Fluid leaking out of the joint surface typically creates a small sac, which can enlarge and cause pain or limitation of motion.  Treatment options include observation, aspiration, or surgical incision were discussed with the patient today.  Observation typically lead to resolution and approximately 10% of patients, aspiration least resolution approximately 50% of patients, and surgical excision lead to resolution in approximately 97% of patients.  After discussion with the patient today, the patient voiced understanding of all treatment options.The patient has elected excision of the ganglion.The risks and benefits of the procedure were explained to the patient, which include, but are not limited to: Bleeding, infection, recurrence, pain, scar, damage to tendons, damage to nerves, and damage to blood vessels, failure to give desired results and complications related to anesthesia.  These risks, along with alternative conservative treatment options, and postoperative protocols were voiced back and understood by the patient.  All questions were answered to the patient's satisfaction.  The patient agrees to comply with a standard postoperative protocol, and is willing to proceed.  Education was provided via written and auditory forms.  There were no barriers to learning. Written handouts regarding wound care, incision and scar care, and general preoperative information was provided to the patient.  Prior to surgery, the patient may be requested to stop all anti-inflammatory medications.  Prophylactic aspirin, Plavix, and Coumadin may be allowed to be continued.  Medications including vitamin E., ginkgo, and fish oil are  requested to be stopped approximately one week prior to surgery.  Hypertensive medications and beta blockers, if taken, should be continued.    Follow Up:  Return 10-14 days post op, for Post-op evaluation.      To Do Next Visit:  Re-evaluation of current issue and Sutures out    ____________________________________________________________________________________________________________________________________________      CHIEF COMPLAINT:  Chief Complaint   Patient presents with    Follow-up     Patient had a cyst drained on 1/2/25 patients cyst is back       SUBJECTIVE:  Chel Isabel is a 64 y.o. year old RHD female who presents for follow-up evaluation of mucoid cyst of left thumb. She was last seen regresses issue on 1/2/2025, at which time she was provided with an aspiration. She states that she kept a compressive wrap intact as instructed, but the cyst returned after approximately 1 week. On today's presentation she continues to report swelling and burning along the radial aspect of the left thumb with direct pressure. She denies any numbness or tingling. She denies any decreased function, except for pain inhibition. She expresses today that she would like to move forward with surgical intervention to have it removed.       I have personally reviewed all the relevant PMH, PSH, SH, FH, Medications and allergies.     PAST MEDICAL HISTORY:  Past Medical History:   Diagnosis Date    Arthritis     Asthma     BRCA1 negative     BRCA2 negative     Cancer (HCC)     thyroid cancer    Colon polyp     Disease of thyroid gland     Fibroid     History of COVID-19 01/04/2022    mild cold s/s    Hyperlipidemia     Hypertension     Hypothyroidism     Postmenopausal bleeding 11/23/2021    Varicella        PAST SURGICAL HISTORY:  Past Surgical History:   Procedure Laterality Date    ADENOIDECTOMY      BREAST BIOPSY      BREAST CYST EXCISION Right 1991    Benign    BREAST LUMPECTOMY      AR HYSTEROSCOPY BX ENDOMETRIUM&/POLYPC W/WO  D&C N/A 03/07/2022    Procedure: DILATATION AND CURETTAGE (D&C) WITH HYSTEROSCOPY, polypectomy;  Surgeon: Cynthia Copeland MD;  Location: [facility] MA[facility] OR;  Service: Gynecology    THYROIDECTOMY      TONSILLECTOMY      VARICOSE VEIN SURGERY Bilateral        FAMILY HISTORY:  Family History   Problem Relation Age of Onset    Breast cancer Mother 50    Heart disease Mother     Heart disease Father     Stroke Father     Kidney disease Father     Breast cancer Sister 50    Hypertension Sister     No Known Problems Daughter     No Known Problems Daughter     Diabetes unspecified Maternal Grandmother     Prostate cancer Maternal Grandfather     Diabetes Maternal Aunt     Diabetes Maternal Uncle     Diabetes Paternal Aunt     Breast cancer Cousin 45       SOCIAL HISTORY:  Social History     Tobacco Use    Smoking status: Never     Passive exposure: Never    Smokeless tobacco: Never   Vaping Use    Vaping status: Never Used   Substance Use Topics    Alcohol use: Yes     Alcohol/week: 1.0 standard drink of alcohol     Types: 1 Glasses of wine per week     Comment: Only on holidays    Drug use: Never       MEDICATIONS:    Current Outpatient Medications:     albuterol (Ventolin HFA) 90 mcg/act inhaler, Inhale 2 puffs every 6 (six) hours as needed for wheezing, Disp: 18 g, Rfl: 1    atorvastatin (LIPITOR) 10 mg tablet, take 1 tablet by mouth once daily, Disp: 90 tablet, Rfl: 1    celecoxib (CeleBREX) 200 mg capsule, take 1 capsule by mouth twice a day if needed for moderate pain, Disp: 60 capsule, Rfl: 0    cetirizine (ZyrTEC) 10 mg tablet, as needed  , Disp: , Rfl:     cimetidine (TAGAMET) 400 mg tablet, take 1 tablet by mouth daily at bedtime, Disp: 90 tablet, Rfl: 3    fluticasone (FLONASE) 50 mcg/act nasal spray, 2 sprays into each nostril as needed for rhinitis, Disp: 16 g, Rfl: 3    omeprazole (PriLOSEC) 40 MG capsule, take 1 capsule by mouth once daily, Disp: 90 capsule, Rfl: 1    pregabalin (LYRICA) 50 mg capsule, Take 1  capsule (50 mg total) by mouth 3 (three) times a day, Disp: 30 capsule, Rfl: 1    Synthroid 175 MCG tablet, TAKE 1 TABLET DAILY FOR POSTPROCEDURAL HYPOTHYROIDISM, Disp: 90 tablet, Rfl: 0    traZODone (DESYREL) 50 mg tablet, take 0.5 to 2 tablets by mouth daily at bedtime if needed for sleep, Disp: 180 tablet, Rfl: 1    trospium chloride (SANCTURA) 20 mg tablet, Take 20 mg by mouth 2 (two) times a day, Disp: , Rfl:     valsartan-hydrochlorothiazide (DIOVAN-HCT) 80-12.5 MG per tablet, take 1 tablet by mouth once daily, Disp: 90 tablet, Rfl: 1    ALLERGIES:  Allergies   Allergen Reactions    Avocado (Diagnostic) - Food Allergy Swelling     Tongue swells    Citrullus Vulgaris Swelling     Tongue swells    Pineapple - Food Allergy Swelling     Tongue swells    Sulfa Antibiotics Rash       REVIEW OF SYSTEMS:  Review of Systems   Constitutional:  Negative for chills, fever and unexpected weight change.   HENT:  Negative for hearing loss, nosebleeds and sore throat.    Eyes:  Negative for pain, redness and visual disturbance.   Respiratory:  Negative for cough, shortness of breath and wheezing.    Cardiovascular:  Negative for chest pain, palpitations and leg swelling.   Gastrointestinal:  Negative for abdominal pain, nausea and vomiting.   Endocrine: Negative for polydipsia and polyuria.   Genitourinary:  Negative for dysuria and hematuria.   Musculoskeletal:         As noted in HPI   Skin:  Negative for rash and wound.   Neurological:  Negative for dizziness, numbness and headaches.   Psychiatric/Behavioral:  Negative for decreased concentration and suicidal ideas. The patient is not nervous/anxious.        VITALS:  There were no vitals filed for this visit.      _____________________________________________________  PHYSICAL EXAMINATION:  General: well developed and well nourished, alert, oriented times 3, and appears comfortable  Psychiatric: Normal  HEENT: Normocephalic, Atraumatic Trachea Midline, No  torticollis  Pulmonary: No audible wheezing or respiratory distress   Cardiovascular: No pitting edema, 2+ radial pulse   Abdominal/GI: abdomen non tender, non distended   Skin: No erythema, lacerations, fluctation, ulcerations -small mass radial aspect dorsal left thumb IP joint  Neurovascular: Sensation Intact to the Median, Ulnar, Radial Nerve, Motor Intact to the Median, Ulnar, Radial Nerve, and Pulses Intact  Musculoskeletal: Normal, except as noted in detailed exam and in HPI.      MUSCULOSKELETAL EXAMINATION:  8 x 14 mm mass dorsal thumb joint  Mild TTP at mass   Compressible  IP joint flexion somewhat limited    ___________________________________________________    STUDIES REVIEWED:  No new imaging reviewed this visit    LABS REVIEWED:    HgA1c:   Lab Results   Component Value Date    HGBA1C 5.8 (H) 08/07/2024     BMP:   Lab Results   Component Value Date    CALCIUM 9.8 08/18/2023    K 4.2 08/07/2024    CO2 25 08/07/2024     08/07/2024    BUN 20 08/07/2024    CREATININE 0.91 08/07/2024         PROCEDURES PERFORMED:  Procedures  No Procedures performed today    _____________________________________________________      Scribe Attestation      I,:  Vazquez Gonzalez am acting as a scribe while in the presence of the attending physician.:       I,:  Lc Mccann MD personally performed the services described in this documentation    as scribed in my presence.:

## 2025-01-28 ENCOUNTER — OFFICE VISIT (OUTPATIENT)
Dept: PHYSICAL THERAPY | Age: 65
End: 2025-01-28
Payer: MEDICARE

## 2025-01-28 DIAGNOSIS — M54.41 CHRONIC BILATERAL LOW BACK PAIN WITH BILATERAL SCIATICA: Primary | ICD-10-CM

## 2025-01-28 DIAGNOSIS — G89.29 CHRONIC BILATERAL LOW BACK PAIN WITH BILATERAL SCIATICA: Primary | ICD-10-CM

## 2025-01-28 DIAGNOSIS — M54.42 CHRONIC BILATERAL LOW BACK PAIN WITH BILATERAL SCIATICA: Primary | ICD-10-CM

## 2025-01-28 PROCEDURE — 97110 THERAPEUTIC EXERCISES: CPT

## 2025-01-28 NOTE — PROGRESS NOTES
"Daily Note     Today's date: 2025  Patient name: Chel Isabel  : 1960  MRN: [id number]  Referring provider: Hema Garcia*  Dx:   Encounter Diagnosis     ICD-10-CM    1. Chronic bilateral low back pain with bilateral sciatica  M54.42     M54.41     G89.29                      Subjective: Pt reports her symptoms are a little better. Both her knees are painful but she gets shots in them soon.       Objective: See treatment diary below      Assessment: Pt continues to respond positively to lumbar flexion to reduce symptoms and improve overall mobility. Incorporated core stabilization/strengthening exercises today for postural control which she tolerated well. Good form with PPT which was incorporated into other TE to ensure proper postural control. Tolerated treatment well. Patient demonstrated fatigue post treatment, exhibited good technique with therapeutic exercises, and would benefit from continued PT      Plan: Continue per plan of care.  Progress treatment as tolerated.       Precautions: Chronic b/l knee pain, Chronic LBP, B/l hip pain, Hx of thyroidectomy, Venous insufficiency of LE's      Manuals             LAD grade IV BLE Assessed (Symptoms centralized)  Traction?                                                 Neuro Re-Ed                                                                                                        Ther Ex             Lumbar flex sitting  HEP            LTR HEP            Bike warm up for LE ROM  10' L1           PPT  5\"x15           PPT w/ marching  5\"x15 BLE           PPT w/ SLR flex  X15 BLE           Hip add iso  10\"x10           Hip abd TB  2x10 blue           TA ball press down  5\"x15            Paloff press  5\"x15 9#           Rows/ext desiree  3x10 20#/14#           Dead bugs             Gabonese ball march             Cat/camel?                          Ther Activity                                       Gait Training                                "        Modalities             Lumbar traction   *

## 2025-01-31 ENCOUNTER — OFFICE VISIT (OUTPATIENT)
Dept: PHYSICAL THERAPY | Age: 65
End: 2025-01-31
Payer: MEDICARE

## 2025-01-31 DIAGNOSIS — G89.29 CHRONIC BILATERAL LOW BACK PAIN WITH BILATERAL SCIATICA: Primary | ICD-10-CM

## 2025-01-31 DIAGNOSIS — M54.41 CHRONIC BILATERAL LOW BACK PAIN WITH BILATERAL SCIATICA: Primary | ICD-10-CM

## 2025-01-31 DIAGNOSIS — M54.42 CHRONIC BILATERAL LOW BACK PAIN WITH BILATERAL SCIATICA: Primary | ICD-10-CM

## 2025-01-31 PROCEDURE — 97110 THERAPEUTIC EXERCISES: CPT

## 2025-01-31 NOTE — PROGRESS NOTES
"Daily Note     Today's date: 2025  Patient name: Chel Isabel  : 1960  MRN: [id number]  Referring provider: Hema Garcia*  Dx:   Encounter Diagnosis     ICD-10-CM    1. Chronic bilateral low back pain with bilateral sciatica  M54.42     M54.41     G89.29                      Subjective: Pt reports slight improvement in symptoms.       Objective: See treatment diary below      Assessment: Pt continues to respond well to lumbar flexion in seated to improve mobility and reduce symptoms. Progressing trunk/core stabilization strength as tolerated. Incorporated seated marching on swiss ball for postural strengthening which challenged pt but she was ultimately able to complete. Tolerated treatment well. Patient demonstrated fatigue post treatment, exhibited good technique with therapeutic exercises, and would benefit from continued PT      Plan: Continue per plan of care.  Progress treatment as tolerated.       Precautions: Chronic b/l knee pain, Chronic LBP, B/l hip pain, Hx of thyroidectomy, Venous insufficiency of LE's      Manuals           LAD grade IV BLE Assessed (Symptoms centralized)                                                   Neuro Re-Ed                                                                                                        Ther Ex             Lumbar flex sitting  HEP  3x10 w/ pb          LTR HEP            Nu step warm up for LE ROM  10' L1 10' L1          PPT  5\"x15 5\"x15          PPT w/ marching  5\"x15 BLE 5\"x20          PPT w/ SLR flex  X15 BLE 2x10 BLE          Hip add iso  10\"x10 10\"x10          Hip abd TB  2x10 blue 3x10 black          TA ball press down  5\"x15  5\"x20          Paloff press  5\"x15 9# 5\"x15 9#          Rows/ext desiree  3x10 20#/14# 3x10 20#/14#          Dead bugs   NT          [place]er ball march   x20          Cat/camel?   NT                       Ther Activity                                       Gait Training                           "             Modalities             Lumbar traction

## 2025-02-04 ENCOUNTER — TELEPHONE (OUTPATIENT)
Age: 65
End: 2025-02-04

## 2025-02-04 ENCOUNTER — OFFICE VISIT (OUTPATIENT)
Dept: PHYSICAL THERAPY | Age: 65
End: 2025-02-04
Payer: MEDICARE

## 2025-02-04 DIAGNOSIS — M54.42 CHRONIC BILATERAL LOW BACK PAIN WITH BILATERAL SCIATICA: Primary | ICD-10-CM

## 2025-02-04 DIAGNOSIS — G89.29 CHRONIC BILATERAL LOW BACK PAIN WITH BILATERAL SCIATICA: Primary | ICD-10-CM

## 2025-02-04 DIAGNOSIS — M54.41 CHRONIC BILATERAL LOW BACK PAIN WITH BILATERAL SCIATICA: Primary | ICD-10-CM

## 2025-02-04 PROCEDURE — 97110 THERAPEUTIC EXERCISES: CPT

## 2025-02-04 NOTE — TELEPHONE ENCOUNTER
Caller: Chel    Doctor:      Reason for call: would like to speak to you  Please return call  Thank you    Call back#: [phone]

## 2025-02-04 NOTE — PROGRESS NOTES
"Daily Note     Today's date: 2025  Patient name: Chel Isabel  : 1960  MRN: [id number]  Referring provider: Hema Garcia*  Dx:   Encounter Diagnosis     ICD-10-CM    1. Chronic bilateral low back pain with bilateral sciatica  M54.42     M54.41     G89.29                      Subjective: Pt reports her low back is feeling a little bit better.       Objective: See treatment diary below      Assessment: Pt is progressing well as she is able to tolerate progression of core stabilization and strengthening exercises. Incorporated more dynamic core strengthening exercises and rotational strengthening which she tolerated well. She continues to respond most positively to lumbar flexion to reduce symptoms. Tolerated treatment well. Patient demonstrated fatigue post treatment, exhibited good technique with therapeutic exercises, and would benefit from continued PT      Plan: Continue per plan of care.  Progress treatment as tolerated.       Precautions: Chronic b/l knee pain, Chronic LBP, B/l hip pain, Hx of thyroidectomy, Venous insufficiency of LE's      Manuals  2/4         LAD grade IV BLE Assessed (Symptoms centralized)                                                   Neuro Re-Ed                                                                                                        Ther Ex             Lumbar flex sitting  HEP  3x10 w/ pb 3x10 w/ pb         LTR HEP            Nu step warm up for LE ROM  10' L1 10' L1 10' L1         PPT  5\"x15 5\"x15 5\"x20          PPT w/ marching  5\"x15 BLE 5\"x20 5\"x20          PPT w/ SLR flex  X15 BLE 2x10 BLE 3x10 BLE         Hip add iso  10\"x10 10\"x10 10\"x10          Hip abd TB  2x10 blue 3x10 black 3x10 black         TA ball press down  5\"x15  5\"x20 5\"x20 standing         Paloff press  5\"x15 9# 5\"x15 9# 5\"x15 9.5#         Rows/ext desiree  3x10 20#/14# 3x10 20#/14# 3x10 20.5#/14.5#         Dead bugs   NT 2x10         Swiss ball march   x20 x20       "   Cat/camel?   NT NT                      Ther Activity                                       Gait Training                                       Modalities             Lumbar traction

## 2025-02-06 ENCOUNTER — HOSPITAL ENCOUNTER (OUTPATIENT)
Dept: RADIOLOGY | Age: 65
Discharge: HOME | End: 2025-02-06
Payer: MEDICARE

## 2025-02-06 DIAGNOSIS — M54.42 CHRONIC BILATERAL LOW BACK PAIN WITH BILATERAL SCIATICA: ICD-10-CM

## 2025-02-06 DIAGNOSIS — M54.41 CHRONIC BILATERAL LOW BACK PAIN WITH BILATERAL SCIATICA: ICD-10-CM

## 2025-02-06 DIAGNOSIS — G89.29 CHRONIC BILATERAL LOW BACK PAIN WITH BILATERAL SCIATICA: ICD-10-CM

## 2025-02-06 PROCEDURE — 72148 MRI LUMBAR SPINE W/O DYE: CPT

## 2025-02-07 ENCOUNTER — OFFICE VISIT (OUTPATIENT)
Dept: PHYSICAL THERAPY | Age: 65
End: 2025-02-07
Payer: MEDICARE

## 2025-02-07 DIAGNOSIS — M54.42 CHRONIC BILATERAL LOW BACK PAIN WITH BILATERAL SCIATICA: Primary | ICD-10-CM

## 2025-02-07 DIAGNOSIS — M54.41 CHRONIC BILATERAL LOW BACK PAIN WITH BILATERAL SCIATICA: Primary | ICD-10-CM

## 2025-02-07 DIAGNOSIS — G89.29 CHRONIC BILATERAL LOW BACK PAIN WITH BILATERAL SCIATICA: Primary | ICD-10-CM

## 2025-02-07 PROCEDURE — 97110 THERAPEUTIC EXERCISES: CPT

## 2025-02-07 RX ORDER — PREGABALIN 75 MG/1
75 CAPSULE ORAL 3 TIMES DAILY
Qty: 30 CAPSULE | Refills: 0 | Status: SHIPPED | OUTPATIENT
Start: 2025-02-07 | End: 2025-03-04

## 2025-02-07 NOTE — PROGRESS NOTES
"Daily Note     Today's date: 2025  Patient name: Chel Isabel  : 1960  MRN: [id number]  Referring provider: Hema Garcia*  Dx:   Encounter Diagnosis     ICD-10-CM    1. Chronic bilateral low back pain with bilateral sciatica  M54.42     M54.41     G89.29                      Subjective: Pt reports that her back is \"burning\" today which happens sometimes.       Objective: See treatment diary below      Assessment: Pt demonstrated increased fatigue in core and lumbar musculature today. Avoided progressing most exercises due to increased symptoms. Tolerated treatment well. Patient demonstrated fatigue post treatment, exhibited good technique with therapeutic exercises, and would benefit from continued PT      Plan: Continue per plan of care.  Progress treatment as tolerated.       Precautions: Chronic b/l knee pain, Chronic LBP, B/l hip pain, Hx of thyroidectomy, Venous insufficiency of LE's      Manuals         LAD grade IV BLE Assessed (Symptoms centralized)                                                   Neuro Re-Ed                                                                                                        Ther Ex             Lumbar flex sitting  HEP  3x10 w/ pb 3x10 w/ pb 3x10 w/ pb        LTR HEP            Nu step warm up for LE ROM  10' L1 10' L1 10' L1 10' L1        PPT  5\"x15 5\"x15 5\"x20  5\"x20        PPT w/ marching  5\"x15 BLE 5\"x20 5\"x20  5\"x20         PPT w/ SLR flex  X15 BLE 2x10 BLE 3x10 BLE 3x10 BLE         Hip add iso  10\"x10 10\"x10 10\"x10  10\"x10         Hip abd TB  2x10 blue 3x10 black 3x10 black 3x10 black        TA ball press down  5\"x15  5\"x20 5\"x20 standing 5\"x20 standing        Paloff press  5\"x15 9# 5\"x15 9# 5\"x15 9.5# 5\"x15 9.5#        Rows/ext desiree  3x10 20#/14# 3x10 20#/14# 3x10 20.5#/14.5# 3x10 20.5#/14.5#        Dead bugs   NT 2x10 2x10         Swiss ball march   x2 x20 X20         Cat/camel?   NT NT NT                     Ther " Activity                                       Gait Training                                       Modalities             Lumbar traction

## 2025-02-08 ENCOUNTER — OFFICE VISIT (OUTPATIENT)
Dept: OBGYN CLINIC | Facility: CLINIC | Age: 65
End: 2025-02-08
Payer: MEDICARE

## 2025-02-08 VITALS — WEIGHT: 233 LBS | BODY MASS INDEX: 38.82 KG/M2 | HEIGHT: 65 IN

## 2025-02-08 DIAGNOSIS — M17.0 PRIMARY OSTEOARTHRITIS OF BOTH KNEES: Primary | ICD-10-CM

## 2025-02-08 DIAGNOSIS — M18.11 PRIMARY OSTEOARTHRITIS OF FIRST CARPOMETACARPAL JOINT OF RIGHT HAND: ICD-10-CM

## 2025-02-08 PROCEDURE — 20610 DRAIN/INJ JOINT/BURSA W/O US: CPT | Performed by: PHYSICAL MEDICINE & REHABILITATION

## 2025-02-08 PROCEDURE — 99214 OFFICE O/P EST MOD 30 MIN: CPT | Performed by: PHYSICAL MEDICINE & REHABILITATION

## 2025-02-08 RX ORDER — ROPIVACAINE HYDROCHLORIDE 5 MG/ML
8 INJECTION, SOLUTION EPIDURAL; INFILTRATION; PERINEURAL
Status: COMPLETED | OUTPATIENT
Start: 2025-02-08 | End: 2025-02-08

## 2025-02-08 RX ORDER — TRIAMCINOLONE ACETONIDE 40 MG/ML
80 INJECTION, SUSPENSION INTRA-ARTICULAR; INTRAMUSCULAR
Status: COMPLETED | OUTPATIENT
Start: 2025-02-08 | End: 2025-02-08

## 2025-02-08 RX ADMIN — TRIAMCINOLONE ACETONIDE 80 MG: 40 INJECTION, SUSPENSION INTRA-ARTICULAR; INTRAMUSCULAR at 10:30

## 2025-02-08 RX ADMIN — ROPIVACAINE HYDROCHLORIDE 8 ML: 5 INJECTION, SOLUTION EPIDURAL; INFILTRATION; PERINEURAL at 10:30

## 2025-02-08 NOTE — PROGRESS NOTES
1. Primary osteoarthritis of both knees  Large joint arthrocentesis: bilateral knee      2. Primary osteoarthritis of first carpometacarpal joint of right hand          Orders Placed This Encounter   Procedures    Large joint arthrocentesis: bilateral knee      Impression:  Patient is here in follow up of bilateral hand pain likely secondary to CMC osteoarthritis.  Treatment has included BL first CMC steroid injection, Voltaren gel and comfort cool gloves.  Continue with Voltaren and comfort cool.  Will have her return for repeat ultrasound CMC joint injections.     Patient also presents with bilateral knee pain likely secondary to osteoarthritis.  Treatment has included bilateral knee steroid injection. Can consider HA injections if still symptomatic. She is currently using celecoxib.  We repeated bilateral knee steroid injections.     Imaging Studies (I personally reviewed images in PACS and report):  Right hand x-rays most recent to this encounter reviewed.  These images show severe osteoarthritis of the CMC joint.     Right knee x-rays most recent to this encounter reviewed.  These images show tricompartmental osteoarthritis is a small joint effusion.  These findings are consistent with Kellgren Efrain grade 2 osteoarthritis.  There is also a sesamoid bone consistent with a fabella.    Left hand x-rays show severe 1st CMC joint OA.  Scattered and severe IP joint osteoarthritis. No acute osseous abnormalities.     Left knee x-rays show medial joint space and patellofemoral joint space narrowing with osteophytes.  Likely intraarticular loose body in the suprapatellar pouch.  This is consistent with Kellgren Efrain grade 3 OA.    No follow-ups on file.    Patient is in agreement with the above plan.    HPI:  Chel Isabel is a 64 y.o. female  who presents in follow up.  Here for   Chief Complaint   Patient presents with    Right Knee - Pain, Follow-up    Left Knee - Pain, Follow-up       Since last visit: See  above.    Following history reviewed and updated:  Past Medical History:   Diagnosis Date    Arthritis     Asthma     BRCA1 negative     BRCA2 negative     Cancer (HCC)     thyroid cancer    Colon polyp     Disease of thyroid gland     Fibroid     History of COVID-19 01/04/2022    mild cold s/s    Hyperlipidemia     Hypertension     Hypothyroidism     Postmenopausal bleeding 11/23/2021    Varicella      Past Surgical History:   Procedure Laterality Date    ADENOIDECTOMY      BREAST BIOPSY      BREAST CYST EXCISION Right 1991    Benign    BREAST LUMPECTOMY      CA HYSTEROSCOPY BX ENDOMETRIUM&/POLYPC W/WO D&C N/A 03/07/2022    Procedure: DILATATION AND CURETTAGE (D&C) WITH HYSTEROSCOPY, polypectomy;  Surgeon: Cynthia Copeland MD;  Location: [facility] MAIN OR;  Service: Gynecology    THYROIDECTOMY      TONSILLECTOMY      VARICOSE VEIN SURGERY Bilateral      Social History   Social History     Substance and Sexual Activity   Alcohol Use Yes    Alcohol/week: 1.0 standard drink of alcohol    Types: 1 Glasses of wine per week    Comment: Only on holidays     Social History     Substance and Sexual Activity   Drug Use Never     Social History     Tobacco Use   Smoking Status Never    Passive exposure: Never   Smokeless Tobacco Never     Family History   Problem Relation Age of Onset    Breast cancer Mother 50    Heart disease Mother     Heart disease Father     Stroke Father     Kidney disease Father     Breast cancer Sister 50    Hypertension Sister     No Known Problems Daughter     No Known Problems Daughter     Diabetes unspecified Maternal Grandmother     Prostate cancer Maternal Grandfather     Diabetes Maternal Aunt     Diabetes Maternal Uncle     Diabetes Paternal Aunt     Breast cancer Cousin 45     Allergies   Allergen Reactions    Avocado (Diagnostic) - Food Allergy Swelling     Tongue swells    Citrullus Vulgaris Swelling     Tongue swells    Pineapple - Food Allergy Swelling     Tongue swells    Sulfa Antibiotics  "Rash        Constitutional:  Ht 5' 5\" (1.651 m)   Wt 106 kg (233 lb)   BMI 38.77 kg/m²    General: NAD.  Eyes: Clear sclerae.  ENT: No inflammation, lesion, or mass of lips.  No tracheal deviation.  Musculoskeletal: As mentioned below.  Integumentary: No visible rashes or skin lesions.  Pulmonary/Chest: Effort normal. No respiratory distress.   Neuro: CN's grossly intact, MASON.  Psych: Normal affect and judgement.  Vascular: WWP.    Right Knee Exam     Tenderness   The patient is experiencing tenderness in the medial joint line.    Range of Motion   Extension:  normal     Tests   Varus: negative Valgus: negative  Patellar apprehension: negative    Other   Erythema: absent  Scars: absent  Sensation: normal  Pulse: present  Swelling: none  Effusion: no effusion present    Comments:  No referred pain into knee with passive hip internal rotation.      Left Knee Exam     Tenderness   The patient is experiencing tenderness in the medial joint line.    Range of Motion   Extension:  normal     Tests   Varus: negative Valgus: negative  Patellar apprehension: negative    Other   Erythema: absent  Scars: absent  Sensation: normal  Pulse: present  Swelling: none  Effusion: no effusion present    Comments:  No referred pain into knee with passive hip internal rotation.             Large joint arthrocentesis: bilateral knee  Universal Protocol:  procedure performed by consultantConsent: Verbal consent obtained. Written consent not obtained.  Risks and benefits: risks, benefits and alternatives were discussed  Consent given by: patient  Timeout called at: 2/8/2025 10:10 AM.  Patient understanding: patient states understanding of the procedure being performed  Patient consent: the patient's understanding of the procedure matches consent given  Site marked: the operative site was marked  Radiology Images displayed and confirmed. If images not available, report reviewed: imaging studies available  Patient identity confirmed: " verbally with patient  Supporting Documentation  Indications: pain   Procedure Details  Location: knee - bilateral knee  Needle size: 22 G  Ultrasound guidance: no  Approach: Inferolateral to patella.    Medications (Right): 8 mL ropivacaine 0.5 %; 80 mg triamcinolone acetonide 40 mg/mLMedications (Left): 8 mL ropivacaine 0.5 %; 80 mg triamcinolone acetonide 40 mg/mL   Patient tolerance: patient tolerated the procedure well with no immediate complications  Dressing:  Sterile dressing applied    There was little to no resistance encountered during the injection.    Risks of this procedure include:    - Risk of bleeding since a needle is involved.  - Risk of infection (1/10,000 chance as per recent studies).  Signs/symptoms were discussed and they would prompt an urgent evaluation at an emergency department.  - Risk of pigmentation or skin dimpling in the skin (2-3% chance as per recent studies) from the steroid.  - Risk of increased pain from steroid flare (1% chance as per recent studies) that typically lasts 24-48 hours.  - Risk of increased blood sugars from the steroid medication that can last for a few weeks.  If the patient is a diabetic or pre-diabetic, they were encouraged to closely monitor their blood sugars and discuss with PCP if elevated more than usual or if having symptoms.    The benefits outweigh the risks and so the procedure was completed.

## 2025-02-11 ENCOUNTER — OFFICE VISIT (OUTPATIENT)
Dept: PHYSICAL THERAPY | Age: 65
End: 2025-02-11
Payer: MEDICARE

## 2025-02-11 DIAGNOSIS — M54.41 CHRONIC BILATERAL LOW BACK PAIN WITH BILATERAL SCIATICA: Primary | ICD-10-CM

## 2025-02-11 DIAGNOSIS — G89.29 CHRONIC BILATERAL LOW BACK PAIN WITH BILATERAL SCIATICA: Primary | ICD-10-CM

## 2025-02-11 DIAGNOSIS — M54.42 CHRONIC BILATERAL LOW BACK PAIN WITH BILATERAL SCIATICA: Primary | ICD-10-CM

## 2025-02-11 PROCEDURE — 97110 THERAPEUTIC EXERCISES: CPT

## 2025-02-11 NOTE — PROGRESS NOTES
"Daily Note     Today's date: 2025  Patient name: Chel Isabel  : 1960  MRN: [id number]  Referring provider: Hema Garcia*  Dx:   Encounter Diagnosis     ICD-10-CM    1. 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       Objective: See treatment diary below      Assessment: Provided pt with b/l sciatic nerve glide today which helped reduce radicular symptoms in LE's. Able to progress dynamic core/trunk stabilization as tolerated. Tolerated treatment well. Patient demonstrated fatigue post treatment, exhibited good technique with therapeutic exercises, and would benefit from continued PT      Plan: Continue per plan of care.  Progress treatment as tolerated.       Precautions: Chronic b/l knee pain, Chronic LBP, B/l hip pain, Hx of thyroidectomy, Venous insufficiency of LE's      Manuals        LAD grade IV BLE Assessed (Symptoms centralized)                                                   Neuro Re-Ed                                                                                                        Ther Ex             Lumbar flex sitting  HEP  3x10 w/ pb 3x10 w/ pb 3x10 w/ pb 3x10 w/ pb       LTR HEP            Nu step warm up for LE ROM  10' L1 10' L1 10' L1 10' L1 10' L1       PPT  5\"x15 5\"x15 5\"x20  5\"x20 5\"x20       PPT w/ marching  5\"x15 BLE 5\"x20 5\"x20  5\"x20  5\"x20       PPT w/ SLR flex  X15 BLE 2x10 BLE 3x10 BLE 3x10 BLE  3x10 BLE       Hip add iso  10\"x10 10\"x10 10\"x10  10\"x10  10\"x10       Hip abd TB  2x10 blue 3x10 black 3x10 black 3x10 black 3x10 black        TA ball press down  5\"x15  5\"x20 5\"x20 standing 5\"x20 standing 5\"x10 F/L/R ea        Paloff press  5\"x15 9# 5\"x15 9# 5\"x15 9.5# 5\"x15 9.5# 5\"x15 10#       Rows/ext desiree  3x10 20#/14# 3x10 20#/14# 3x10 " 20.5#/14.5# 3x10 20.5#/14.5# 3x10 21#/14.5#       Dead bugs   NT 2x10 2x10  2x10        Swiss ball march   x20 x20 X20  x40       Cat/camel?   NT NT NT        Sciatic nerve glides      X20 :B                                 Ther Activity                                       Gait Training                                       Modalities             Lumbar traction

## 2025-02-12 ENCOUNTER — RESULTS FOLLOW-UP (OUTPATIENT)
Dept: ENDOCRINOLOGY | Facility: CLINIC | Age: 65
End: 2025-02-12

## 2025-02-12 ENCOUNTER — APPOINTMENT (OUTPATIENT)
Dept: LAB | Age: 65
End: 2025-02-12
Payer: MEDICARE

## 2025-02-12 DIAGNOSIS — C73 THYROID CANCER (HCC): ICD-10-CM

## 2025-02-12 LAB
T4 FREE SERPL-MCNC: 0.79 NG/DL (ref 0.61–1.12)
TSH SERPL DL<=0.05 MIU/L-ACNC: 0.89 UIU/ML (ref 0.45–4.5)

## 2025-02-12 PROCEDURE — 84432 ASSAY OF THYROGLOBULIN: CPT

## 2025-02-12 PROCEDURE — 84439 ASSAY OF FREE THYROXINE: CPT

## 2025-02-12 PROCEDURE — 84443 ASSAY THYROID STIM HORMONE: CPT

## 2025-02-12 PROCEDURE — 86800 THYROGLOBULIN ANTIBODY: CPT

## 2025-02-14 ENCOUNTER — OFFICE VISIT (OUTPATIENT)
Dept: PHYSICAL THERAPY | Age: 65
End: 2025-02-14
Payer: MEDICARE

## 2025-02-14 ENCOUNTER — ANESTHESIA EVENT (OUTPATIENT)
Age: 65
End: 2025-02-14
Payer: MEDICARE

## 2025-02-14 DIAGNOSIS — M54.42 CHRONIC BILATERAL LOW BACK PAIN WITH BILATERAL SCIATICA: Primary | ICD-10-CM

## 2025-02-14 DIAGNOSIS — G89.29 CHRONIC BILATERAL LOW BACK PAIN WITH BILATERAL SCIATICA: Primary | ICD-10-CM

## 2025-02-14 DIAGNOSIS — M54.41 CHRONIC BILATERAL LOW BACK PAIN WITH BILATERAL SCIATICA: Primary | ICD-10-CM

## 2025-02-14 PROCEDURE — 97110 THERAPEUTIC EXERCISES: CPT

## 2025-02-14 NOTE — PROGRESS NOTES
"Daily Note     Today's date: 2025  Patient name: Chel Isabel  : 1960  MRN: [id number]  Referring provider: Hema Garcia*  Dx:   Encounter Diagnosis     ICD-10-CM    1. Chronic bilateral low back pain with bilateral sciatica  M54.42     M54.41     G89.29                      Subjective: Pt reports that she has difficulty with prolonged standing and walking because of her back pain. Still has symptoms in her legs that don't go away but sometimes it is better than others.       Objective: See treatment diary below      Assessment: Tolerated treatment well. Patient demonstrated fatigue post treatment, exhibited good technique with therapeutic exercises, and would benefit from continued PT      Plan: Continue per plan of care.  Progress treatment as tolerated.       Precautions: Chronic b/l knee pain, Chronic LBP, B/l hip pain, Hx of thyroidectomy, Venous insufficiency of LE's      Manuals       LAD grade IV BLE Assessed (Symptoms centralized)                                                   Neuro Re-Ed                                                                                                        Ther Ex             Lumbar flex sitting  HEP  3x10 w/ pb 3x10 w/ pb 3x10 w/ pb 3x10 w/ pb 3x10 w/ pb      LTR HEP            Nu step warm up for LE ROM  10' L1 10' L1 10' L1 10' L1 10' L1 10' L3      PPT  5\"x15 5\"x15 5\"x20  5\"x20 5\"x20 5\"x20      PPT w/ marching  5\"x15 BLE 5\"x20 5\"x20  5\"x20  5\"x20 5\"x20       PPT w/ SLR flex  X15 BLE 2x10 BLE 3x10 BLE 3x10 BLE  3x10 BLE 3x10 BLE      Hip add iso  10\"x10 10\"x10 10\"x10  10\"x10  10\"x10 10\"x10      Hip abd TB  2x10 blue 3x10 black 3x10 black 3x10 black 3x10 black  3x10 black       TA ball press down  5\"x15  5\"x20 5\"x20 standing 5\"x20 standing 5\"x10 F/L/R ea  5\"x10 F/L/R ea      Paloff press  5\"x15 9# 5\"x15 9# 5\"x15 9.5# 5\"x15 9.5# 5\"x15 10# 5\"x15 10#      Rows/ext desiree  3x10 20#/14# 3x10 20#/14# 3x10 20.5#/14.5# 3x10 " 20.5#/14.5# 3x10 21#/14.5# 3x10 21#/14.5#      Dead bugs   NT 2x10 2x10  2x10  2x10      Swiss ball march   x20 x20 X20  x40 x40      Cat/camel?   NT NT NT        Sciatic nerve glides      X20 :B X20 :B                                Ther Activity                                       Gait Training                                       Modalities             Lumbar traction

## 2025-02-17 ENCOUNTER — OFFICE VISIT (OUTPATIENT)
Dept: ENDOCRINOLOGY | Facility: CLINIC | Age: 65
End: 2025-02-17
Payer: MEDICARE

## 2025-02-17 VITALS
SYSTOLIC BLOOD PRESSURE: 122 MMHG | DIASTOLIC BLOOD PRESSURE: 80 MMHG | WEIGHT: 234.4 LBS | BODY MASS INDEX: 39.05 KG/M2 | HEIGHT: 65 IN

## 2025-02-17 DIAGNOSIS — E89.0 POSTOPERATIVE HYPOTHYROIDISM: ICD-10-CM

## 2025-02-17 DIAGNOSIS — E61.1 IRON DEFICIENCY: ICD-10-CM

## 2025-02-17 DIAGNOSIS — E55.9 VITAMIN D DEFICIENCY: ICD-10-CM

## 2025-02-17 DIAGNOSIS — C73 THYROID CANCER (HCC): Primary | ICD-10-CM

## 2025-02-17 PROCEDURE — G2211 COMPLEX E/M VISIT ADD ON: HCPCS | Performed by: INTERNAL MEDICINE

## 2025-02-17 PROCEDURE — 99214 OFFICE O/P EST MOD 30 MIN: CPT | Performed by: INTERNAL MEDICINE

## 2025-02-17 NOTE — PROGRESS NOTES
Name: Chel Isabel      : 1960      MRN: [id number]  Encounter Provider: Shane Vazquez MD  Encounter Date: 2025   Encounter department: [facility] [place]  :  Assessment & Plan  hx Thyroid cancer  There is no evidence of thyroid cancer recurrence at this time.  Await thyroglobulin level.         Postoperative hypothyroidism  The TSH is in the target range which is 0.5-1.  Continue current levothyroxine dose.         Vitamin D deficiency  She is complaining of fatigue which could be due to severe vitamin D deficiency.  Check 25-hydroxy vitamin D level.  Orders:    Vitamin D 25 hydroxy; Future    Iron deficiency  She has noticed hair loss, fatigue and nail changes.  This could be due to iron deficiency.  Check iron panel.  Orders:    Iron Panel (Includes Ferritin, Iron Sat%, Iron, and TIBC); Future        History of Present Illness   HPI  Chel Isabel is a 64 y.o. female who presents for follow-up of thyroid cancer.  She was diagnosed with thyroid cancer in  at which time she underwent a total thyroidectomy followed by radioactive iodine treatment.  She is currently on levothyroxine for postoperative hypothyroidism.  She denies any symptoms of hypo or hyperthyroidism.  She does complain of fatigue, hair changes and nail changes.  History obtained from: patient    Review of Systems   Constitutional:  Positive for fatigue. Negative for chills and fever.   Respiratory:  Negative for shortness of breath.    Cardiovascular:  Negative for chest pain.   Gastrointestinal:  Negative for constipation, diarrhea, nausea and vomiting.   Endocrine: Negative for cold intolerance and heat intolerance.   All other systems reviewed and are negative.    Current Outpatient Medications on File Prior to Visit   Medication Sig Dispense Refill    albuterol (Ventolin HFA) 90 mcg/act inhaler Inhale 2 puffs every 6 (six) hours as needed for wheezing 18 g 1    atorvastatin (LIPITOR) 10 mg  "tablet take 1 tablet by mouth once daily 90 tablet 1    celecoxib (CeleBREX) 200 mg capsule take 1 capsule by mouth twice a day if needed for moderate pain 60 capsule 0    cetirizine (ZyrTEC) 10 mg tablet as needed        cimetidine (TAGAMET) 400 mg tablet take 1 tablet by mouth daily at bedtime 90 tablet 3    fluticasone (FLONASE) 50 mcg/act nasal spray 2 sprays into each nostril as needed for rhinitis 16 g 3    omeprazole (PriLOSEC) 40 MG capsule take 1 capsule by mouth once daily 90 capsule 1    pregabalin (LYRICA) 75 mg capsule Take 1 capsule (75 mg total) by mouth 3 (three) times a day for 10 days 30 capsule 0    Synthroid 175 MCG tablet TAKE 1 TABLET DAILY FOR POSTPROCEDURAL HYPOTHYROIDISM 90 tablet 0    traZODone (DESYREL) 50 mg tablet take 0.5 to 2 tablets by mouth daily at bedtime if needed for sleep 180 tablet 1    trospium chloride (SANCTURA) 20 mg tablet Take 20 mg by mouth 2 (two) times a day      valsartan-hydrochlorothiazide (DIOVAN-HCT) 80-12.5 MG per tablet take 1 tablet by mouth once daily 90 tablet 1     No current facility-administered medications on file prior to visit.         Objective   /80 (BP Location: Left arm, Patient Position: Sitting, Cuff Size: Adult)   Ht 5' 5\" (1.651 m)   Wt 106 kg (234 lb 6.4 oz)   BMI 39.01 kg/m²      Physical Exam  Vitals and nursing note reviewed.   Constitutional:       Appearance: Normal appearance. She is obese.   HENT:      Head: Normocephalic and atraumatic.   Eyes:      General: No scleral icterus.        Right eye: No discharge.         Left eye: No discharge.   Neck:      Comments: The thyroid is absent.  Pulmonary:      Effort: Pulmonary effort is normal.   Musculoskeletal:         General: Normal range of motion.      Cervical back: Normal range of motion.   Skin:     Coloration: Skin is not jaundiced.   Neurological:      General: No focal deficit present.      Mental Status: She is alert and oriented to person, place, and time.   Psychiatric: "         Mood and Affect: Mood normal.         Behavior: Behavior normal.

## 2025-02-17 NOTE — ASSESSMENT & PLAN NOTE
There is no evidence of thyroid cancer recurrence at this time.  Await thyroglobulin level.

## 2025-02-18 ENCOUNTER — OFFICE VISIT (OUTPATIENT)
Dept: PHYSICAL THERAPY | Age: 65
End: 2025-02-18
Payer: MEDICARE

## 2025-02-18 DIAGNOSIS — M54.42 CHRONIC BILATERAL LOW BACK PAIN WITH BILATERAL SCIATICA: Primary | ICD-10-CM

## 2025-02-18 DIAGNOSIS — G89.29 CHRONIC BILATERAL LOW BACK PAIN WITH BILATERAL SCIATICA: Primary | ICD-10-CM

## 2025-02-18 DIAGNOSIS — M54.41 CHRONIC BILATERAL LOW BACK PAIN WITH BILATERAL SCIATICA: Primary | ICD-10-CM

## 2025-02-18 PROCEDURE — 97110 THERAPEUTIC EXERCISES: CPT

## 2025-02-18 NOTE — PROGRESS NOTES
"Daily Note     Today's date: 2025  Patient name: Chel Isabel  : 1960  MRN: [id number]  Referring provider: Hema Garcia*  Dx:   Encounter Diagnosis     ICD-10-CM    1. Chronic bilateral low back pain with bilateral sciatica  M54.42     M54.41     G89.29                      Subjective: Pt reports some soreness in low back today.       Objective: See treatment diary below      Assessment: Pt challenged by dynamic core/trunk stabilization exercises. Responding positively to lumbar flexion to reduce symptoms. Overall symptom reduction and improved gait as a result post-tx. Tolerated treatment well. Patient demonstrated fatigue post treatment, exhibited good technique with therapeutic exercises, and would benefit from continued PT      Plan: Continue per plan of care.  Progress treatment as tolerated.       Precautions: Chronic b/l knee pain, Chronic LBP, B/l hip pain, Hx of thyroidectomy, Venous insufficiency of LE's      Manuals      LAD grade IV BLE Assessed (Symptoms centralized)                                                   Neuro Re-Ed                                                                                                        Ther Ex             Lumbar flex sitting  HEP  3x10 w/ pb 3x10 w/ pb 3x10 w/ pb 3x10 w/ pb 3x10 w/ pb 3x10 w/ pb     LTR HEP            Nu step warm up for LE ROM  10' L1 10' L1 10' L1 10' L1 10' L1 10' L3 10' L3     PPT  5\"x15 5\"x15 5\"x20  5\"x20 5\"x20 5\"x20 5\"x20     PPT w/ marching  5\"x15 BLE 5\"x20 5\"x20  5\"x20  5\"x20 5\"x20  5\"x20     PPT w/ SLR flex  X15 BLE 2x10 BLE 3x10 BLE 3x10 BLE  3x10 BLE 3x10 BLE 3x10 BLE     Hip add iso  10\"x10 10\"x10 10\"x10  10\"x10  10\"x10 10\"x10 10\"x10     Hip abd TB  2x10 blue 3x10 black 3x10 black 3x10 black 3x10 black  3x10 black  3x10 black      TA ball press down  5\"x15  5\"x20 5\"x20 standing 5\"x20 standing 5\"x10 F/L/R ea  5\"x10 F/L/R ea 5\"x10 F/L/R ea     Paloff press  5\"x15 9# 5\"x15 9# " "5\"x15 9.5# 5\"x15 9.5# 5\"x15 10# 5\"x15 10# 5\"x15 10#     Rows/ext desiree  3x10 20#/14# 3x10 20#/14# 3x10 20.5#/14.5# 3x10 20.5#/14.5# 3x10 21#/14.5# 3x10 21#/14.5# 3x10 21#/14.5#     Dead bugs   NT 2x10 2x10  2x10  2x10 x20     Swiss ball march   x20 x20 X20  x40 x40 40     Cat/camel?   NT NT NT        Sciatic nerve glides      X20 :B X20 :B X20 :B                               Ther Activity                                       Gait Training                                       Modalities             Lumbar traction                                           "

## 2025-02-20 ENCOUNTER — APPOINTMENT (OUTPATIENT)
Dept: LAB | Age: 65
End: 2025-02-20
Payer: MEDICARE

## 2025-02-20 DIAGNOSIS — I10 ESSENTIAL HYPERTENSION: ICD-10-CM

## 2025-02-20 DIAGNOSIS — E66.9 OBESITY (BMI 35.0-39.9 WITHOUT COMORBIDITY): ICD-10-CM

## 2025-02-20 DIAGNOSIS — R73.03 PREDIABETES: ICD-10-CM

## 2025-02-20 DIAGNOSIS — M67.442 GANGLION CYST OF FINGER OF LEFT HAND: ICD-10-CM

## 2025-02-20 DIAGNOSIS — M67.40 MUCOID CYST OF JOINT: ICD-10-CM

## 2025-02-20 DIAGNOSIS — Z01.812 PRE-PROCEDURE LAB EXAM: ICD-10-CM

## 2025-02-20 DIAGNOSIS — E55.9 VITAMIN D DEFICIENCY: ICD-10-CM

## 2025-02-20 DIAGNOSIS — E61.1 IRON DEFICIENCY: ICD-10-CM

## 2025-02-20 DIAGNOSIS — E06.3 HASHIMOTO'S THYROIDITIS: ICD-10-CM

## 2025-02-20 DIAGNOSIS — E78.00 HYPERCHOLESTEREMIA: ICD-10-CM

## 2025-02-20 LAB
25(OH)D3 SERPL-MCNC: 20.9 NG/ML (ref 30–100)
ANION GAP SERPL CALCULATED.3IONS-SCNC: 8 MMOL/L (ref 4–13)
ATRIAL RATE: 68 BPM
BUN SERPL-MCNC: 21 MG/DL (ref 5–25)
CALCIUM SERPL-MCNC: 9.5 MG/DL (ref 8.4–10.2)
CHLORIDE SERPL-SCNC: 107 MMOL/L (ref 96–108)
CO2 SERPL-SCNC: 28 MMOL/L (ref 21–32)
CREAT SERPL-MCNC: 0.85 MG/DL (ref 0.6–1.3)
FERRITIN SERPL-MCNC: 101 NG/ML (ref 11–307)
GFR SERPL CREATININE-BSD FRML MDRD: 72 ML/MIN/1.73SQ M
GLUCOSE P FAST SERPL-MCNC: 97 MG/DL (ref 65–99)
IRON SATN MFR SERPL: 20 % (ref 15–50)
IRON SERPL-MCNC: 58 UG/DL (ref 50–212)
P AXIS: 56 DEGREES
POTASSIUM SERPL-SCNC: 3.9 MMOL/L (ref 3.5–5.3)
PR INTERVAL: 168 MS
QRS AXIS: 40 DEGREES
QRSD INTERVAL: 86 MS
QT INTERVAL: 424 MS
QTC INTERVAL: 451 MS
SODIUM SERPL-SCNC: 143 MMOL/L (ref 135–147)
T WAVE AXIS: 26 DEGREES
TIBC SERPL-MCNC: 295.4 UG/DL (ref 250–450)
TRANSFERRIN SERPL-MCNC: 211 MG/DL (ref 203–362)
UIBC SERPL-MCNC: 237 UG/DL (ref 155–355)
VENTRICULAR RATE: 68 BPM

## 2025-02-20 PROCEDURE — 82728 ASSAY OF FERRITIN: CPT

## 2025-02-20 PROCEDURE — 83550 IRON BINDING TEST: CPT

## 2025-02-20 PROCEDURE — 36415 COLL VENOUS BLD VENIPUNCTURE: CPT

## 2025-02-20 PROCEDURE — 93010 ELECTROCARDIOGRAM REPORT: CPT | Performed by: INTERNAL MEDICINE

## 2025-02-20 PROCEDURE — 82306 VITAMIN D 25 HYDROXY: CPT

## 2025-02-20 PROCEDURE — 80048 BASIC METABOLIC PNL TOTAL CA: CPT

## 2025-02-20 PROCEDURE — 83540 ASSAY OF IRON: CPT

## 2025-02-21 ENCOUNTER — RESULTS FOLLOW-UP (OUTPATIENT)
Dept: ENDOCRINOLOGY | Facility: CLINIC | Age: 65
End: 2025-02-21

## 2025-02-21 ENCOUNTER — OFFICE VISIT (OUTPATIENT)
Dept: PHYSICAL THERAPY | Age: 65
End: 2025-02-21
Payer: MEDICARE

## 2025-02-21 DIAGNOSIS — G89.29 CHRONIC BILATERAL LOW BACK PAIN WITH BILATERAL SCIATICA: Primary | ICD-10-CM

## 2025-02-21 DIAGNOSIS — M54.42 CHRONIC BILATERAL LOW BACK PAIN WITH BILATERAL SCIATICA: Primary | ICD-10-CM

## 2025-02-21 DIAGNOSIS — M54.41 CHRONIC BILATERAL LOW BACK PAIN WITH BILATERAL SCIATICA: Primary | ICD-10-CM

## 2025-02-21 PROCEDURE — 97110 THERAPEUTIC EXERCISES: CPT

## 2025-02-21 NOTE — PROGRESS NOTES
"Daily Note     Today's date: 2025  Patient name: Chel Isabel  : 1960  MRN: [id number]  Referring provider: Hema Garcia*  Dx:   Encounter Diagnosis     ICD-10-CM    1. Chronic bilateral low back pain with bilateral sciatica  M54.42     M54.41     G89.29                      Subjective: Pt reports that she is tired today.       Objective: See treatment diary below      Assessment: Pt improving with lumbar mobility and core strength. She was able to complete additional repetitions of \"dead bug\" exercise before fatiguing. Would benefit from continued progression of LE and core strengthening. Tolerated treatment well. Patient demonstrated fatigue post treatment, exhibited good technique with therapeutic exercises, and would benefit from continued PT      Plan: Continue per plan of care.  Progress treatment as tolerated.       Precautions: Chronic b/l knee pain, Chronic LBP, B/l hip pain, Hx of thyroidectomy, Venous insufficiency of LE's      Manuals     LAD grade IV BLE Assessed (Symptoms centralized)                                                   Neuro Re-Ed                                                                                                        Ther Ex             Lumbar flex sitting  HEP  3x10 w/ pb 3x10 w/ pb 3x10 w/ pb 3x10 w/ pb 3x10 w/ pb 3x10 w/ pb 3x10 w/ pb    LTR HEP            Nu step warm up for LE ROM  10' L1 10' L1 10' L1 10' L1 10' L1 10' L3 10' L3 Bike 10'     PPT  5\"x15 5\"x15 5\"x20  5\"x20 5\"x20 5\"x20 5\"x20 5\"x20    PPT w/ marching  5\"x15 BLE 5\"x20 5\"x20  5\"x20  5\"x20 5\"x20  5\"x20 5\"x20    PPT w/ SLR flex  X15 BLE 2x10 BLE 3x10 BLE 3x10 BLE  3x10 BLE 3x10 BLE 3x10 BLE 3x10 BLE    Hip add iso  10\"x10 10\"x10 10\"x10  10\"x10  10\"x10 10\"x10 10\"x10 10\"x10    Hip abd TB  2x10 blue 3x10 black 3x10 black 3x10 black 3x10 black  3x10 black  3x10 black  3x10 black    TA ball press down  5\"x15  5\"x20 5\"x20 standing 5\"x20 standing 5\"x10 " "F/L/R ea  5\"x10 F/L/R ea 5\"x10 F/L/R ea 5\"x10 F/L/[place]onna    Paloff press  5\"x15 9# 5\"x15 9# 5\"x15 9.5# 5\"x15 9.5# 5\"x15 10# 5\"x15 10# 5\"x15 10# 5\"x15 10#    Rows/ext desiree  3x10 20#/14# 3x10 20#/14# 3x10 20.5#/14.5# 3x10 20.5#/14.5# 3x10 21#/14.5# 3x10 21#/14.5# 3x10 21#/14.5# 3x10 21#/14#    Dead bugs   NT 2x10 2x10  2x10  2x10 x20 X2 rounds    Swiss ball march   x20 x20 X20  x40 x40 40 x40    Cat/camel?   NT NT NT        Sciatic nerve glides      X20 :B X20 :B X20 :B X20 :B                              Ther Activity                                       Gait Training                                       Modalities             Lumbar traction                                             "

## 2025-02-21 NOTE — RESULT ENCOUNTER NOTE
The vitamin D level is low.  Would recommend ergocalciferol 50,000 units 3 times per week for total of 20 doses.  When finished, take 5000 units/day.  The iron levels are good.

## 2025-02-23 LAB
THYROGLOB AB SERPL-ACNC: 9.3 IU/ML (ref 0–0.9)
THYROGLOB SERPL-MCNC: <2 NG/ML

## 2025-02-24 ENCOUNTER — TELEPHONE (OUTPATIENT)
Age: 65
End: 2025-02-24

## 2025-02-24 NOTE — TELEPHONE ENCOUNTER
Called patient, she is going to send me a photo of the finger via email so we can evaluate. If it is not bothersome she does not need to have it removed, however it is possible this will re-accumulate

## 2025-02-24 NOTE — TELEPHONE ENCOUNTER
Caller: Patient     Doctor: Dr. Mccann    Reason for call: Patient called scheduled for surgery 2/28, stated cyst went down but she can still feel the sack has questions if she should still proceed with surgery. Please advise.    Call back#: [phone]

## 2025-02-25 ENCOUNTER — RESULTS FOLLOW-UP (OUTPATIENT)
Dept: ENDOCRINOLOGY | Facility: CLINIC | Age: 65
End: 2025-02-25

## 2025-02-25 ENCOUNTER — OFFICE VISIT (OUTPATIENT)
Dept: PHYSICAL THERAPY | Age: 65
End: 2025-02-25
Payer: MEDICARE

## 2025-02-25 DIAGNOSIS — M54.41 CHRONIC BILATERAL LOW BACK PAIN WITH BILATERAL SCIATICA: Primary | ICD-10-CM

## 2025-02-25 DIAGNOSIS — M54.42 CHRONIC BILATERAL LOW BACK PAIN WITH BILATERAL SCIATICA: Primary | ICD-10-CM

## 2025-02-25 DIAGNOSIS — G89.29 CHRONIC BILATERAL LOW BACK PAIN WITH BILATERAL SCIATICA: Primary | ICD-10-CM

## 2025-02-25 PROCEDURE — 97110 THERAPEUTIC EXERCISES: CPT

## 2025-02-25 RX ORDER — OMEGA-3S/DHA/EPA/FISH OIL/D3 300MG-1000
400 CAPSULE ORAL DAILY
COMMUNITY

## 2025-02-25 NOTE — PRE-PROCEDURE INSTRUCTIONS
Pre-Surgery Instructions:   Medication Instructions    albuterol (Ventolin HFA) 90 mcg/act inhaler Uses PRN- OK to take day of surgery    atorvastatin (LIPITOR) 10 mg tablet Take day of surgery.    celecoxib (CeleBREX) 200 mg capsule Stop taking 3 days prior to surgery.    cetirizine (ZyrTEC) 10 mg tablet N/a for DOS     cholecalciferol (VITAMIN D3) 400 units tablet Inst to hold until after sx.     cimetidine (TAGAMET) 400 mg tablet Take night before surgery    omeprazole (PriLOSEC) 40 MG capsule Take day of surgery.    pregabalin (LYRICA) 75 mg capsule Take day of surgery.    Synthroid 175 MCG tablet Take day of surgery.    traZODone (DESYREL) 50 mg tablet Take night before surgery    valsartan-hydrochlorothiazide (DIOVAN-HCT) 80-12.5 MG per tablet Hold day of surgery.    Medication instructions for day of surgery reviewed. Please take all instructed medications with only a sip of water.       You will receive a call one business day prior to surgery with an arrival time and hospital directions. If your surgery is scheduled on a Monday, the hospital will be calling you on the Friday prior to your surgery. If you have not heard from anyone by 8pm, please call the hospital supervisor through the hospital  at [phone]. ([place] [phone]).    Do not eat or drink anything after midnight the night before your surgery, including candy, mints, lifesavers, or chewing gum. Do not drink alcohol 24hrs before your surgery. Try not to smoke at least 24hrs before your surgery.       Follow the pre surgery showering instructions as listed in the “My Surgical Experience Booklet” or otherwise provided by your surgeon's office. Do not use a blade to shave the surgical area 1 week before surgery. It is okay to use a clean electric clippers up to 24 hours before surgery. Do not apply any lotions, creams, including makeup, cologne, deodorant, or perfumes after showering on the day of your surgery. Do not use dry  shampoo, hair spray, hair gel, or any type of hair products.     No contact lenses, eye make-up, or artificial eyelashes. Remove nail polish, including gel polish, and any artificial, gel, or acrylic nails if possible. Remove all jewelry including rings and body piercing jewelry.     Wear causal clothing that is easy to take on and off. Consider your type of surgery.    Keep any valuables, jewelry, piercings at home. Please bring any specially ordered equipment (sling, braces) if indicated.    Arrange for a responsible person to drive you to and from the hospital on the day of your surgery. Please confirm the visitor policy for the day of your procedure when you receive your phone call with an arrival time.     Call the surgeon's office with any new illnesses, exposures, or additional questions prior to surgery.    Please reference your “My Surgical Experience Booklet” for additional information to prepare for your upcoming surgery.

## 2025-02-25 NOTE — PROGRESS NOTES
"Daily Note     Today's date: 2025  Patient name: Chel Isabel  : 1960  MRN: [id number]  Referring provider: Hema Garcia*  Dx:   Encounter Diagnosis     ICD-10-CM    1. Chronic bilateral low back pain with bilateral sciatica  M54.42     M54.41     G89.29                      Subjective: Pt reports she is sick so she is pretty tired.       Objective: See treatment diary below      Assessment: Pt continues to progress well. Tolerated treatment well. Patient demonstrated fatigue post treatment, exhibited good technique with therapeutic exercises, and would benefit from continued PT      Plan: Continue per plan of care.  Progress treatment as tolerated.       Precautions: Chronic b/l knee pain, Chronic LBP, B/l hip pain, Hx of thyroidectomy, Venous insufficiency of LE's      Manuals    LAD grade IV BLE Assessed (Symptoms centralized)                                                   Neuro Re-Ed                                                                                                        Ther Ex             Lumbar flex sitting  HEP  3x10 w/ pb 3x10 w/ pb 3x10 w/ pb 3x10 w/ pb 3x10 w/ pb 3x10 w/ pb 3x10 w/ pb 3x10 w/ pb   LTR HEP            Nu step warm up for LE ROM  10' L1 10' L1 10' L1 10' L1 10' L1 10' L3 10' L3 Bike 10'  10' L3   PPT  5\"x15 5\"x15 5\"x20  5\"x20 5\"x20 5\"x20 5\"x20 5\"x20 5\"x20   PPT w/ marching  5\"x15 BLE 5\"x20 5\"x20  5\"x20  5\"x20 5\"x20  5\"x20 5\"x20 NT   PPT w/ SLR flex  X15 BLE 2x10 BLE 3x10 BLE 3x10 BLE  3x10 BLE 3x10 BLE 3x10 BLE 3x10 BLE 3x10 BLE   Hip add iso  10\"x10 10\"x10 10\"x10  10\"x10  10\"x10 10\"x10 10\"x10 10\"x10 10\"x10   Hip abd TB  2x10 blue 3x10 black 3x10 black 3x10 black 3x10 black  3x10 black  3x10 black  3x10 black 3x10 black    TA ball press down  5\"x15  5\"x20 5\"x20 standing 5\"x20 standing 5\"x10 F/L/R ea  5\"x10 F/L/R ea 5\"x10 F/L/R ea 5\"x10 F/L/[place] 5\"x10 F/L/[place]   Paloff press  5\"x15 9# 5\"x15 9# 5\"x15 9.5# 5\"x15 " "9.5# 5\"x15 10# 5\"x15 10# 5\"x15 10# 5\"x15 10# 5\"x15 10.5#   Rows/ext desiree  3x10 20#/14# 3x10 20#/14# 3x10 20.5#/14.5# 3x10 20.5#/14.5# 3x10 21#/14.5# 3x10 21#/14.5# 3x10 21#/14.5# 3x10 21#/14# 3x10 21#/14#   Dead bugs   NT 2x10 2x10  2x10  2x10 x20 X2 rounds X2 rounds   Swiss ball march   x20 x20 X20  x40 x40 40 x40 X40    Cat/camel?   NT NT NT        Sciatic nerve glides      X20 :B X20 :B X20 :B X20 :B X20 :B                             Ther Activity                                       Gait Training                                       Modalities             Lumbar traction                                               "

## 2025-02-27 PROCEDURE — NC001 PR NO CHARGE: Performed by: SURGERY

## 2025-02-27 NOTE — H&P
ASSESSMENT/PLAN:       Assessment & Plan     Discussed with patient updated presentation is consistent with recurrence of mucoid cyst of left thumb IP joint   Patient elects to move forward with surgical  intervention via mass excision left thumb IP joint with arthrotomy and synovectomy.   Procedure, prognosis, benefits, and risks were discussed in great detail. Patient expresses understanding and detailed consent was obtained.   Patient will be seen 10-14 days postop for reevaluation.   Patient expresses understanding and is in agreement with this treatment plan.        The patient verbalized understanding of exam findings and treatment plan. We engaged in the shared decision-making process and treatment options were discussed at length with the patient. Surgical and conservative management discussed today along with risks and benefits.     Diagnoses and all orders for this visit:     Mucoid cyst of joint  Comments:  IP joint left thumb  Orders:  -     Case request operating room: EXCISION BIOPSY TISSUE LESION/MASS UPPER EXTREMITY, IP arthrotomy + synovectomy; Standing  -     Basic metabolic panel; Future  -     EKG 12 lead; Future  -     Case request operating room: EXCISION BIOPSY TISSUE LESION/MASS UPPER EXTREMITY, IP arthrotomy + synovectomy     Pre-procedure lab exam  -     Basic metabolic panel; Future  -     EKG 12 lead; Future     Other orders  -     Nursing Communication Warmimg Interventions Implemented; Standing  -     Nursing Communication CHG bath, have staff wash entire body (neck down) per pre-op bathing protocol. Routine, evening prior to, and day of surgery.; Standing  -     Nursing Communication Swab both nares with Povidone-Iodine solution, EXCLUDE if patient has shellfish/Iodine allergy, and replace with nasal alcohol swabstick. Routine, day of surgery, on call to OR; Standing  -     chlorhexidine (PERIDEX) 0.12 % oral rinse 15 mL  -     Void on call to OR; Standing  -     Insert peripheral IV;  Standing  -     Diet NPO; Sips with meds; Standing  -     Apply Sequential Compression Device; Standing  -     ceFAZolin (ANCEF) IVPB (premix in dextrose) 2,000 mg 50 mL             Ganglion Cyst Excision: The anatomy and physiology of the ganglion was discussed with the patient today in the office.  Fluid leaking out of the joint surface typically creates a small sac, which can enlarge and cause pain or limitation of motion.  Treatment options include observation, aspiration, or surgical incision were discussed with the patient today.  Observation typically lead to resolution and approximately 10% of patients, aspiration least resolution approximately 50% of patients, and surgical excision lead to resolution in approximately 97% of patients.  After discussion with the patient today, the patient voiced understanding of all treatment options.The patient has elected excision of the ganglion.The risks and benefits of the procedure were explained to the patient, which include, but are not limited to: Bleeding, infection, recurrence, pain, scar, damage to tendons, damage to nerves, and damage to blood vessels, failure to give desired results and complications related to anesthesia.  These risks, along with alternative conservative treatment options, and postoperative protocols were voiced back and understood by the patient.  All questions were answered to the patient's satisfaction.  The patient agrees to comply with a standard postoperative protocol, and is willing to proceed.  Education was provided via written and auditory forms.  There were no barriers to learning. Written handouts regarding wound care, incision and scar care, and general preoperative information was provided to the patient.  Prior to surgery, the patient may be requested to stop all anti-inflammatory medications.  Prophylactic aspirin, Plavix, and Coumadin may be allowed to be continued.  Medications including vitamin E., ginkgo, and fish oil are  requested to be stopped approximately one week prior to surgery.  Hypertensive medications and beta blockers, if taken, should be continued.     Follow Up:  Return 10-14 days post op, for Post-op evaluation.        To Do Next Visit:  Re-evaluation of current issue and Sutures out     ____________________________________________________________________________________________________________________________________________        CHIEF COMPLAINT:       Chief Complaint   Patient presents with    Follow-up       Patient had a cyst drained on 1/2/25 patients cyst is back         SUBJECTIVE:  Chel Isabel is a 64 y.o. year old RHD female who presents for follow-up evaluation of mucoid cyst of left thumb. She was last seen regresses issue on 1/2/2025, at which time she was provided with an aspiration. She states that she kept a compressive wrap intact as instructed, but the cyst returned after approximately 1 week. On today's presentation she continues to report swelling and burning along the radial aspect of the left thumb with direct pressure. She denies any numbness or tingling. She denies any decreased function, except for pain inhibition. She expresses today that she would like to move forward with surgical intervention to have it removed.        I have personally reviewed all the relevant PMH, PSH, SH, FH, Medications and allergies.      PAST MEDICAL HISTORY:       Past Medical History:   Diagnosis Date    Arthritis      Asthma      BRCA1 negative      BRCA2 negative      Cancer (HCC)       thyroid cancer    Colon polyp      Disease of thyroid gland      Fibroid      History of COVID-19 01/04/2022     mild cold s/s    Hyperlipidemia      Hypertension      Hypothyroidism      Postmenopausal bleeding 11/23/2021    Varicella           PAST SURGICAL HISTORY:        Past Surgical History:   Procedure Laterality Date    ADENOIDECTOMY        BREAST BIOPSY        BREAST CYST EXCISION Right 1991     Benign    BREAST LUMPECTOMY         OH HYSTEROSCOPY BX ENDOMETRIUM&/POLYPC W/WO D&C N/A 03/07/2022     Procedure: DILATATION AND CURETTAGE (D&C) WITH HYSTEROSCOPY, polypectomy;  Surgeon: Cynthia Copeland MD;  Location: [facility] MA[facility] OR;  Service: Gynecology    THYROIDECTOMY        TONSILLECTOMY        VARICOSE VEIN SURGERY Bilateral           FAMILY HISTORY:        Family History   Problem Relation Age of Onset    Breast cancer Mother 50    Heart disease Mother      Heart disease Father      Stroke Father      Kidney disease Father      Breast cancer Sister 50    Hypertension Sister      No Known Problems Daughter      No Known Problems Daughter      Diabetes unspecified Maternal Grandmother      Prostate cancer Maternal Grandfather      Diabetes Maternal Aunt      Diabetes Maternal Uncle      Diabetes Paternal Aunt      Breast cancer Cousin 45         SOCIAL HISTORY:  Social History            Tobacco Use    Smoking status: Never       Passive exposure: Never    Smokeless tobacco: Never   Vaping Use    Vaping status: Never Used   Substance Use Topics    Alcohol use: Yes       Alcohol/week: 1.0 standard drink of alcohol       Types: 1 Glasses of wine per week       Comment: Only on holidays    Drug use: Never         MEDICATIONS:     Current Outpatient Medications:     albuterol (Ventolin HFA) 90 mcg/act inhaler, Inhale 2 puffs every 6 (six) hours as needed for wheezing, Disp: 18 g, Rfl: 1    atorvastatin (LIPITOR) 10 mg tablet, take 1 tablet by mouth once daily, Disp: 90 tablet, Rfl: 1    celecoxib (CeleBREX) 200 mg capsule, take 1 capsule by mouth twice a day if needed for moderate pain, Disp: 60 capsule, Rfl: 0    cetirizine (ZyrTEC) 10 mg tablet, as needed  , Disp: , Rfl:     cimetidine (TAGAMET) 400 mg tablet, take 1 tablet by mouth daily at bedtime, Disp: 90 tablet, Rfl: 3    fluticasone (FLONASE) 50 mcg/act nasal spray, 2 sprays into each nostril as needed for rhinitis, Disp: 16 g, Rfl: 3    omeprazole (PriLOSEC) 40 MG capsule, take 1 capsule  by mouth once daily, Disp: 90 capsule, Rfl: 1    pregabalin (LYRICA) 50 mg capsule, Take 1 capsule (50 mg total) by mouth 3 (three) times a day, Disp: 30 capsule, Rfl: 1    Synthroid 175 MCG tablet, TAKE 1 TABLET DAILY FOR POSTPROCEDURAL HYPOTHYROIDISM, Disp: 90 tablet, Rfl: 0    traZODone (DESYREL) 50 mg tablet, take 0.5 to 2 tablets by mouth daily at bedtime if needed for sleep, Disp: 180 tablet, Rfl: 1    trospium chloride (SANCTURA) 20 mg tablet, Take 20 mg by mouth 2 (two) times a day, Disp: , Rfl:     valsartan-hydrochlorothiazide (DIOVAN-HCT) 80-12.5 MG per tablet, take 1 tablet by mouth once daily, Disp: 90 tablet, Rfl: 1     ALLERGIES:        Allergies   Allergen Reactions    Avocado (Diagnostic) - Food Allergy Swelling       Tongue swells    Citrullus Vulgaris Swelling       Tongue swells    Pineapple - Food Allergy Swelling       Tongue swells    Sulfa Antibiotics Rash         REVIEW OF SYSTEMS:  Review of Systems   Constitutional:  Negative for chills, fever and unexpected weight change.   HENT:  Negative for hearing loss, nosebleeds and sore throat.    Eyes:  Negative for pain, redness and visual disturbance.   Respiratory:  Negative for cough, shortness of breath and wheezing.    Cardiovascular:  Negative for chest pain, palpitations and leg swelling.   Gastrointestinal:  Negative for abdominal pain, nausea and vomiting.   Endocrine: Negative for polydipsia and polyuria.   Genitourinary:  Negative for dysuria and hematuria.   Musculoskeletal:         As noted in HPI   Skin:  Negative for rash and wound.   Neurological:  Negative for dizziness, numbness and headaches.   Psychiatric/Behavioral:  Negative for decreased concentration and suicidal ideas. The patient is not nervous/anxious.          VITALS:  There were no vitals filed for this visit.        _____________________________________________________  PHYSICAL EXAMINATION:  General: well developed and well nourished, alert, oriented times 3, and  appears comfortable  Psychiatric: Normal  HEENT: Normocephalic, Atraumatic Trachea Midline, No torticollis  Pulmonary: No audible wheezing or respiratory distress   Cardiovascular: No pitting edema, 2+ radial pulse   Abdominal/GI: abdomen non tender, non distended   Skin: No erythema, lacerations, fluctation, ulcerations -small mass radial aspect dorsal left thumb IP joint  Neurovascular: Sensation Intact to the Median, Ulnar, Radial Nerve, Motor Intact to the Median, Ulnar, Radial Nerve, and Pulses Intact  Musculoskeletal: Normal, except as noted in detailed exam and in HPI.        MUSCULOSKELETAL EXAMINATION:  8 x 14 mm mass dorsal thumb joint  Mild TTP at mass   Compressible  IP joint flexion somewhat limited     ___________________________________________________     STUDIES REVIEWED:  No new imaging reviewed this visit     LABS REVIEWED:     HgA1c:         Lab Results   Component Value Date     HGBA1C 5.8 (H) 08/07/2024      BMP:         Lab Results   Component Value Date     CALCIUM 9.8 08/18/2023     K 4.2 08/07/2024     CO2 25 08/07/2024      08/07/2024     BUN 20 08/07/2024     CREATININE 0.91 08/07/2024            PROCEDURES PERFORMED:  Procedures  No Procedures performed today     _____________________________________________________

## 2025-02-28 ENCOUNTER — APPOINTMENT (OUTPATIENT)
Dept: PHYSICAL THERAPY | Age: 65
End: 2025-02-28
Payer: MEDICARE

## 2025-02-28 ENCOUNTER — HOSPITAL ENCOUNTER (OUTPATIENT)
Age: 65
Setting detail: OUTPATIENT SURGERY
Discharge: HOME | End: 2025-02-28
Attending: SURGERY | Admitting: SURGERY
Payer: MEDICARE

## 2025-02-28 ENCOUNTER — ANESTHESIA (OUTPATIENT)
Age: 65
End: 2025-02-28
Payer: MEDICARE

## 2025-02-28 VITALS
SYSTOLIC BLOOD PRESSURE: 128 MMHG | HEIGHT: 65 IN | RESPIRATION RATE: 22 BRPM | BODY MASS INDEX: 38.15 KG/M2 | HEART RATE: 63 BPM | DIASTOLIC BLOOD PRESSURE: 70 MMHG | OXYGEN SATURATION: 92 % | TEMPERATURE: 98.5 F | WEIGHT: 229 LBS

## 2025-02-28 DIAGNOSIS — M67.40 MUCOID CYST OF JOINT: Primary | ICD-10-CM

## 2025-02-28 DIAGNOSIS — Z47.89 AFTERCARE FOLLOWING SURGERY OF THE MUSCULOSKELETAL SYSTEM: ICD-10-CM

## 2025-02-28 PROCEDURE — 88307 TISSUE EXAM BY PATHOLOGIST: CPT | Performed by: PATHOLOGY

## 2025-02-28 PROCEDURE — 26160 REMOVE TENDON SHEATH LESION: CPT | Performed by: PHYSICIAN ASSISTANT

## 2025-02-28 PROCEDURE — 26160 REMOVE TENDON SHEATH LESION: CPT | Performed by: SURGERY

## 2025-02-28 RX ORDER — MIDAZOLAM HYDROCHLORIDE 2 MG/2ML
INJECTION, SOLUTION INTRAMUSCULAR; INTRAVENOUS AS NEEDED
Status: DISCONTINUED | OUTPATIENT
Start: 2025-02-28 | End: 2025-02-28

## 2025-02-28 RX ORDER — SODIUM CHLORIDE, SODIUM LACTATE, POTASSIUM CHLORIDE, CALCIUM CHLORIDE 600; 310; 30; 20 MG/100ML; MG/100ML; MG/100ML; MG/100ML
125 INJECTION, SOLUTION INTRAVENOUS CONTINUOUS
Status: DISCONTINUED | OUTPATIENT
Start: 2025-02-28 | End: 2025-02-28 | Stop reason: HOSPADM

## 2025-02-28 RX ORDER — METOCLOPRAMIDE HYDROCHLORIDE 5 MG/ML
10 INJECTION INTRAMUSCULAR; INTRAVENOUS ONCE AS NEEDED
Status: DISCONTINUED | OUTPATIENT
Start: 2025-02-28 | End: 2025-02-28 | Stop reason: HOSPADM

## 2025-02-28 RX ORDER — HYDROCODONE BITARTRATE AND ACETAMINOPHEN 5; 325 MG/1; MG/1
1 TABLET ORAL EVERY 6 HOURS PRN
Refills: 0 | Status: DISCONTINUED | OUTPATIENT
Start: 2025-02-28 | End: 2025-02-28 | Stop reason: HOSPADM

## 2025-02-28 RX ORDER — PROPOFOL 10 MG/ML
INJECTION, EMULSION INTRAVENOUS AS NEEDED
Status: DISCONTINUED | OUTPATIENT
Start: 2025-02-28 | End: 2025-02-28

## 2025-02-28 RX ORDER — HYDROCODONE BITARTRATE AND ACETAMINOPHEN 5; 325 MG/1; MG/1
1 TABLET ORAL EVERY 6 HOURS PRN
Qty: 6 TABLET | Refills: 0 | Status: SHIPPED | OUTPATIENT
Start: 2025-02-28 | End: 2025-03-10

## 2025-02-28 RX ORDER — FENTANYL CITRATE/PF 50 MCG/ML
50 SYRINGE (ML) INJECTION
Status: DISCONTINUED | OUTPATIENT
Start: 2025-02-28 | End: 2025-02-28 | Stop reason: HOSPADM

## 2025-02-28 RX ORDER — CHLORHEXIDINE GLUCONATE ORAL RINSE 1.2 MG/ML
15 SOLUTION DENTAL ONCE
Status: COMPLETED | OUTPATIENT
Start: 2025-02-28 | End: 2025-02-28

## 2025-02-28 RX ORDER — FENTANYL CITRATE 50 UG/ML
INJECTION, SOLUTION INTRAMUSCULAR; INTRAVENOUS AS NEEDED
Status: DISCONTINUED | OUTPATIENT
Start: 2025-02-28 | End: 2025-02-28

## 2025-02-28 RX ORDER — HYDROMORPHONE HCL/PF 1 MG/ML
0.5 SYRINGE (ML) INJECTION
Status: DISCONTINUED | OUTPATIENT
Start: 2025-02-28 | End: 2025-02-28 | Stop reason: HOSPADM

## 2025-02-28 RX ORDER — CEFAZOLIN SODIUM 2 G/50ML
2000 SOLUTION INTRAVENOUS ONCE
Status: COMPLETED | OUTPATIENT
Start: 2025-02-28 | End: 2025-02-28

## 2025-02-28 RX ORDER — PROPOFOL 10 MG/ML
INJECTION, EMULSION INTRAVENOUS CONTINUOUS PRN
Status: DISCONTINUED | OUTPATIENT
Start: 2025-02-28 | End: 2025-02-28

## 2025-02-28 RX ADMIN — FENTANYL CITRATE 25 MCG: 50 INJECTION INTRAMUSCULAR; INTRAVENOUS at 08:17

## 2025-02-28 RX ADMIN — FENTANYL CITRATE 25 MCG: 50 INJECTION INTRAMUSCULAR; INTRAVENOUS at 08:03

## 2025-02-28 RX ADMIN — CHLORHEXIDINE GLUCONATE 15 ML: 1.2 SOLUTION ORAL at 07:10

## 2025-02-28 RX ADMIN — SODIUM CHLORIDE, SODIUM LACTATE, POTASSIUM CHLORIDE, AND CALCIUM CHLORIDE 125 ML/HR: .6; .31; .03; .02 INJECTION, SOLUTION INTRAVENOUS at 07:09

## 2025-02-28 RX ADMIN — PROPOFOL 80 MCG/KG/MIN: 10 INJECTION, EMULSION INTRAVENOUS at 08:03

## 2025-02-28 RX ADMIN — PROPOFOL 50 MG: 10 INJECTION, EMULSION INTRAVENOUS at 08:03

## 2025-02-28 RX ADMIN — CEFAZOLIN SODIUM 2000 MG: 2 SOLUTION INTRAVENOUS at 08:04

## 2025-02-28 RX ADMIN — MIDAZOLAM 2 MG: 1 INJECTION INTRAMUSCULAR; INTRAVENOUS at 07:58

## 2025-02-28 NOTE — ANESTHESIA POSTPROCEDURE EVALUATION
Post-Op Assessment Note    CV Status:  Stable    Pain management: adequate       Mental Status:  Alert and awake   Hydration Status:  Euvolemic   PONV Controlled:  Controlled   Airway Patency:  Patent     Post Op Vitals Reviewed: Yes    No anethesia notable event occurred.    Staff: Anesthesiologist, with CRNAs           Last Filed PACU Vitals:  Vitals Value Taken Time   Temp 98.5 °F (36.9 °C) 02/28/25 0835   Pulse 63 02/28/25 0900   /74 02/28/25 0900   Resp 16 02/28/25 0900   SpO2 93 % 02/28/25 0900       Modified Chapin:     Vitals Value Taken Time   Activity 2 02/28/25 0859   Respiration 2 02/28/25 0859   Circulation 2 02/28/25 0859   Consciousness 2 02/28/25 0859   Oxygen Saturation 2 02/28/25 0859     Modified Chapin Score: 10

## 2025-02-28 NOTE — ANESTHESIA POSTPROCEDURE EVALUATION
Post-Op Assessment Note    CV Status:  Stable    Pain management: adequate       Mental Status:  Alert and awake   Hydration Status:  Euvolemic   PONV Controlled:  Controlled   Airway Patency:  Patent     Post Op Vitals Reviewed: Yes    No anethesia notable event occurred.    Staff: CRNA           Last Filed PACU Vitals:  Vitals Value Taken Time   Temp 98.5 °F (36.9 °C) 02/28/25 0835   Pulse 72 02/28/25 0836   /66 02/28/25 0835   Resp 19 02/28/25 0836   SpO2 92 % 02/28/25 0836   Vitals shown include unfiled device data.    Modified Chapin:     Vitals Value Taken Time   Activity 2 02/28/25 0835   Respiration 2 02/28/25 0835   Circulation 1 02/28/25 0835   Consciousness 1 02/28/25 0835   Oxygen Saturation 2 02/28/25 0835     Modified Chapin Score: 8

## 2025-02-28 NOTE — OP NOTE
OPERATIVE REPORT  PATIENT NAME: Chel Isabel  :  1960  MRN: [id number]  Pt Location: [facility]    SURGERY DATE: 25    Surgeons and Role:     * Lc Mccann MD - Primary     * Zuleyma Israel PA-C - Assisting    Pre-Op Diagnosis:  Mucoid cyst of joint [M67.40]    Post-Op Diagnosis Codes:     * Mucoid cyst of joint [M67.40]    Procedure(s):  EXCISION BIOPSY TISSUE LESION/MASS UPPER EXTREMITY, IP arthrotomy + synovectomy + osteoarthromtomy (Left)    Specimen(s):  Order Name Source Comment Collection Info Order Time   TISSUE EXAM Hand, Left  Collected By: Lc Mccann MD 2025  8:12 AM     Release to patient through Mychart   Immediate            Estimated Blood Loss:   Minimal    Anesthesia Type:   Conscious Sedation     IMPLANTS:  * No implants in log *    PERIOPERATIVE ANTIBIOTICS:    cefazolin, 2 grams    Tourniquet Time: 13 min  at 250 mmHg          Operative Indications:  The patient has a history of right thumb mass at IP joint that was recalcitrant to conservative management.  The decision was made to bring the patient to the operating room for right thumb mass excision risks of the procedure were explained which include, but are not limited to bleeding; infection; damage to nerves, arteries,veins, tendons; scar; pain; need for reoperation; failure to give desired result; and risks of anaesthesia.  All questions were answered to satisfaction and they were willing to proceed.         Operative Findings:  Decompressed cyst approximately 6 x 4 mm  Significant joint effusion of the IP joint with associated hypertrophic synovitis  Small distal phalanx osteophyte and area of mass    Complications:   None    Procedure and Technique:  After the patient, site, and procedure were identified, the patient was brought into the operating room in a supine position.  Local anaesthesia and sedation were provided.  A well padded tourniquet was applied to the extremity, set at 250 mmHg.  The  left upper  extremity was then prepped and drapped in a normal, sterile, orthopedic fashion.    A curvilinear incision was made overlying the radial aspect of the thumb IP joint.  Full-thickness skin flaps were elevated under loupe magnification be careful to protect superficial neurovascular structures.  Decompressed cyst sac was identified and dissected away from surrounding soft tissue structures.  Was noted to lie over the radial aspect of the joint and the extensor mechanism.  Fully isolated the mass was excised and sent for surgical pathologic evaluation.  Radial sided longitudinal arthrotomy was made being careful to preserve the EPL insertion.  Upon entering the joint a large joint effusion was decompressed.  Synovitis was noted which was sharply excised.  Ostectomy was performed of the small distal phalanx osteophyte in the region of the mass.  Inspection of the surgical site did not demonstrate any further satellite lesions or remaining synovitis within the joint.  The wound was copiously irrigated and the arthrotomy closed with 5-0 Monocryl.   At the completion of the procedure, hemostasis was obtained with cautery and direct pressure.  The wounds were copiously irrigated with sterile solution.  The wounds were closed with Prolene.  Sterile dressings were applied, including Xeroform, Gauze, and Finger Dressing.  Please note, all sponge, needle, and instrument counts were correct prior to closure.  Loupe magnification was utilized.  The patient tolerated the procedure well.     I was present for the entire procedure., A qualified resident physician was not available., and A physician assistant was required during the procedure for retraction, tissue handling, dissection and suturing.    Patient Disposition:  PACU     SIGNATURE: Lc Mccann MD  DATE: 02/28/25  TIME: 8:27 AM

## 2025-02-28 NOTE — ANESTHESIA PREPROCEDURE EVALUATION
Procedure:  EXCISION BIOPSY TISSUE LESION/MASS UPPER EXTREMITY, IP arthrotomy + synovectomy (Left: Thumb)    Relevant Problems   CARDIO   (+) Essential hypertension   (+) Hypercholesteremia   (+) Venous insufficiency of both lower extremities      ENDO   (+) Postoperative hypothyroidism      GI/HEPATIC   (+) Gastroesophageal reflux disease   (+) Pharyngoesophageal dysphagia      MUSCULOSKELETAL   (+) Primary osteoarthritis of first carpometacarpal joint of right hand        Physical Exam    Airway    Mallampati score: II  TM Distance: >3 FB  Neck ROM: full     Dental   No notable dental hx     Cardiovascular      Pulmonary      Other Findings  post-pubertal.      Anesthesia Plan  ASA Score- 3     Anesthesia Type- IV sedation with anesthesia with ASA Monitors.         Additional Monitors:     Airway Plan:            Plan Factors-Exercise tolerance (METS): >4 METS.    Chart reviewed. EKG reviewed.  Existing labs reviewed. Patient summary reviewed.    Patient is not a current smoker.      There is medical exclusion for perioperative obstructive sleep apnea risk education.        Induction- intravenous.    Postoperative Plan-         Informed Consent- Anesthetic plan and risks discussed with patient.  I personally reviewed this patient with the CRNA. Discussed and agreed on the Anesthesia Plan with the CRNA..      NPO Status:  Vitals Value Taken Time   Date of last liquid 02/28/25 02/28/25 0656   Time of last liquid 0500 02/28/25 0656   Date of last solid 02/27/25 02/28/25 0656   Time of last solid 1900 02/28/25 0656

## 2025-02-28 NOTE — DISCHARGE INSTR - AVS FIRST PAGE
Post Operative Instructions    You have had surgery on your arm today, please read and follow the information below:  Elevate your hand above your elbow during the next 24-48 hours to help with swelling.  Place your hand and arm over your head with motion at your shoulder three times a day.  Do not apply any cream/ointment/oil to your incisions including antibiotics.  Do not soak your hands in standing water (dishwater, tubs, Jacuzzi's, pools, etc.) until given permission (typically 2-3 weeks after injury)    Call the office if you notice any:  Increased numbness or tingling of your hand or fingers that is not relieved with elevation.  Increasing pain that is not controlled with medication.  Difficulty chewing, breathing, swallowing.  Chest pains or shortness of breath.  Fever over 101.4 degrees.    Bandage: Please keep bandages clean and dry. Remove bandage after 7 days. Once bandages are removed you may wash hands with soap and water. Short showers are okay as well, but please avoid soaking the hand as described above (ie no pools, baths, dirty dish water, hot tubs, ocean/lake water, etc). Sutures will be removed in the office at your first follow up visit, please do not remove them yourself.    Please do NOT put any topical agents on the surgical wound including neosporin, peroxide, tea tree oil, vitamin E, etc. as these can delay wound healing.    Motion: Move fingers into a fist 5 times a day, DO NOT move any splinted fingers.    Weight bearing status: Avoid heavy lifting (>5 pounds) with the extremity that was operated on until follow up appointment. Normal activities of daily living are OK.    Ice: Ice for 10 minutes every hour as needed for swelling x 24 hours.    Sling: No sling necessary.    Pain medication:   Naproxen 220 mg two times a day (this is over the counter!)  *do not take this medication if you were told by your doctor that you cannot take anti-inflammatories or NSAIDS  Tylenol Extended Release  650 mg every 8 hours (this is over the counter!)   Norco/Hydrocodone one tab every 6 hours ONLY AS NEEDED for severe pain        Follow-up Appointment: 10-14 days with Dr Mccann        Please call the office if you have any questions or concerns regarding your post-operative care.

## 2025-03-04 ENCOUNTER — CONSULT (OUTPATIENT)
Dept: PAIN MEDICINE | Facility: CLINIC | Age: 65
End: 2025-03-04
Payer: MEDICARE

## 2025-03-04 VITALS
BODY MASS INDEX: 39.15 KG/M2 | HEIGHT: 65 IN | HEART RATE: 66 BPM | SYSTOLIC BLOOD PRESSURE: 144 MMHG | DIASTOLIC BLOOD PRESSURE: 80 MMHG | WEIGHT: 235 LBS | RESPIRATION RATE: 14 BRPM

## 2025-03-04 DIAGNOSIS — G89.29 CHRONIC BILATERAL LOW BACK PAIN WITH BILATERAL SCIATICA: ICD-10-CM

## 2025-03-04 DIAGNOSIS — M54.41 CHRONIC BILATERAL LOW BACK PAIN WITH BILATERAL SCIATICA: ICD-10-CM

## 2025-03-04 DIAGNOSIS — M53.3 SACROILIAC JOINT PAIN: ICD-10-CM

## 2025-03-04 DIAGNOSIS — G62.9 NEUROPATHY: Primary | ICD-10-CM

## 2025-03-04 DIAGNOSIS — M54.42 CHRONIC BILATERAL LOW BACK PAIN WITH BILATERAL SCIATICA: ICD-10-CM

## 2025-03-04 PROCEDURE — 99204 OFFICE O/P NEW MOD 45 MIN: CPT | Performed by: PHYSICAL MEDICINE & REHABILITATION

## 2025-03-04 NOTE — PROGRESS NOTES
Assessment  1. Chronic bilateral low back pain with bilateral sciatica        Plan  Chel Isabel is a 64 y.o. female HTN, Rhinitis, GERD, Neuropathy.  Patient is here secondary to chronic low back pain that has been going for 20+ years.  Patient states the pain is sharp in nature in the back and the legs.  She states the back pain is worse than the leg pain.  She states the left side is worse than the right side in the bilateral lower extremities.  She describes the pain moderate in nature.  She rates the pain 6 out of 10 at this time.  She states the pain is there constantly.  She does endorse numbness and paresthesias in the bilateral lower extremity.  She states most of the paresthesias are in the distal extremity going up to the calf.  Patient states the pain is there constantly.  Nothing really makes it better or worse.  Patient has currently been doing physical therapy with minimal relief.  Patient has never had any prior injections or EMG.  Patient has tried gabapentin and Lyrica with minimal relief for neuropathy. Denies bowel dysfunction, saddle anesthesia, fevers, chills, weight loss, night pain, or night sweats at this time. The pain interferes with function, ADLs and quality of life.    At this time we will      Will do left sided SIJ injection for therapeutic and diagnostic purposes.   Will get an EMG of the bilateral lower extremity.   Complete risks and benefits including bleeding, infection, tissue reaction, nerve injury and allergic reaction were discussed. The approach was demonstrated using models and literature was provided.        My impressions and treatment recommendations were discussed in detail with the patient who verbalized understanding and had no further questions.  Discharge instructions were provided. I personally saw and examined the patient and I agree with the above discussed plan of care.    No orders of the defined types were placed in this encounter.    No orders of the defined  types were placed in this encounter.    History of Present Illness    Chel Isabel is a 64 y.o. female HTN, Rhinitis, GERD, Neuropathy.  Patient is here secondary to chronic low back pain that has been going for 20+ years.  Patient states the pain is sharp in nature in the back and the legs.  She states the back pain is worse than the leg pain.  She states the left side is worse than the right side in the bilateral lower extremities.  She describes the pain moderate in nature.  She rates the pain 6 out of 10 at this time.  She states the pain is there constantly.  She does endorse numbness and paresthesias in the bilateral lower extremity.  She states most of the paresthesias are in the distal extremity going up to the calf.  Patient states the pain is there constantly.  Nothing really makes it better or worse.  Patient has currently been doing physical therapy with minimal relief.  Patient has never had any prior injections or EMG.  Patient has tried gabapentin and Lyrica with minimal relief for neuropathy. Denies bowel dysfunction, saddle anesthesia, fevers, chills, weight loss, night pain, or night sweats at this time. The pain interferes with function, ADLs and quality of life.    I have personally reviewed and/or updated the patient's past medical history, past surgical history, family history, social history, current medications, allergies, and vital signs today.     Review of Systems   Constitutional:  Negative for fever and unexpected weight change.   HENT:  Negative for trouble swallowing.    Eyes:  Negative for visual disturbance.   Respiratory:  Negative for shortness of breath and wheezing.    Cardiovascular:  Negative for chest pain and palpitations.   Gastrointestinal:  Negative for constipation, diarrhea, nausea and vomiting.   Endocrine: Negative for cold intolerance, heat intolerance and polydipsia.   Genitourinary:  Negative for difficulty urinating and frequency.   Musculoskeletal:  Positive for back  pain. Negative for arthralgias, gait problem, joint swelling and myalgias.        B/L leg pain   Skin:  Negative for rash.   Neurological:  Negative for dizziness, seizures, syncope, weakness and headaches.   Hematological:  Does not bruise/bleed easily.   Psychiatric/Behavioral:  Negative for dysphoric mood.    All other systems reviewed and are negative.      Patient Active Problem List   Diagnosis    Allergic rhinitis    Arthropathy, multiple sites    History of malignant neoplasm of thyroid    Essential hypertension    Postoperative hypothyroidism    Neuropathy    Hypercholesteremia    Urge incontinence    Chronic pain of both knees    hx Thyroid cancer    Dysphonia    Gastroesophageal reflux disease    Paresis of right vocal fold    Reflux laryngitis    Glottic insufficiency    Muscle tension dysphonia    Xerostomia    Pharyngoesophageal dysphagia    Hx of total thyroidectomy    Obesity (BMI 35.0-39.9 without comorbidity)    Right wrist pain    Primary osteoarthritis of first carpometacarpal joint of right hand    Sensorineural hearing loss (SNHL) of both ears    Tinnitus of both ears    Venous insufficiency of both lower extremities    Mucoid cyst of joint       Past Medical History:   Diagnosis Date    Arthritis     Asthma     Back pain     BRCA1 negative     BRCA2 negative     Cancer (HCC)     thyroid cancer    Colon polyp     Disease of thyroid gland     Fibroid     GERD (gastroesophageal reflux disease)     History of COVID-19 01/04/2022    mild cold s/s    Hyperlipidemia     Hypertension     Hypothyroidism     Iron deficiency     Peripheral neuropathy     Pharyngoesophageal dysphagia     PONV (postoperative nausea and vomiting)     Postmenopausal bleeding 11/23/2021    Seasonal allergies     Varicella        Past Surgical History:   Procedure Laterality Date    ADENOIDECTOMY      BREAST BIOPSY      BREAST CYST EXCISION Right 1991    Benign    BREAST LUMPECTOMY      ME EXC TUMOR SOFT TISS UPPER ARM/ELBOW  SUBQ <3CM Left 2/28/2025    Procedure: EXCISION BIOPSY TISSUE LESION/MASS UPPER EXTREMITY, IP arthrotomy + synovectomy + osteoarthromtomy;  Surgeon: Lc Mccann MD;  Location:  MAIN OR;  Service: Orthopedics    [place] HYSTEROSCOPY BX ENDOMETRIUM&/POLYPC W/WO D&C N/A 03/07/2022    Procedure: DILATATION AND CURETTAGE (D&C) WITH HYSTEROSCOPY, polypectomy;  Surgeon: Cynthia Copeland MD;  Location: [facility] OR;  Service: Gynecology    THYROIDECTOMY      TONSILLECTOMY      VARICOSE VEIN SURGERY Bilateral        Family History   Problem Relation Age of Onset    Breast cancer Mother 50    Heart disease Mother     Heart disease Father     Stroke Father     Kidney disease Father     Breast cancer Sister 50    Hypertension Sister     No Known Problems Daughter     No Known Problems Daughter     Diabetes unspecified Maternal Grandmother     Prostate cancer Maternal Grandfather     Diabetes Maternal Aunt     Diabetes Maternal Uncle     Diabetes Paternal Aunt     Breast cancer Cousin 45       Social History     Occupational History    Not on file   Tobacco Use    Smoking status: Never     Passive exposure: Never    Smokeless tobacco: Never   Vaping Use    Vaping status: Never Used   Substance and Sexual Activity    Alcohol use: Not Currently     Comment: Only on holidays    Drug use: Never    Sexual activity: Yes     Partners: Male     Birth control/protection: Post-menopausal       Current Outpatient Medications on File Prior to Visit   Medication Sig    albuterol (Ventolin HFA) 90 mcg/act inhaler Inhale 2 puffs every 6 (six) hours as needed for wheezing    atorvastatin (LIPITOR) 10 mg tablet take 1 tablet by mouth once daily    celecoxib (CeleBREX) 200 mg capsule take 1 capsule by mouth twice a day if needed for moderate pain    cetirizine (ZyrTEC) 10 mg tablet as needed      cholecalciferol (VITAMIN D3) 400 units tablet Take 400 Units by mouth daily    cimetidine (TAGAMET) 400 mg tablet take 1 tablet by mouth daily at  bedtime    fluticasone (FLONASE) 50 mcg/act nasal spray 2 sprays into each nostril as needed for rhinitis    HYDROcodone-acetaminophen (NORCO) 5-325 mg per tablet Take 1 tablet by mouth every 6 (six) hours as needed for pain for up to 10 days Max Daily Amount: 4 tablets    omeprazole (PriLOSEC) 40 MG capsule take 1 capsule by mouth once daily    pregabalin (LYRICA) 75 mg capsule Take 1 capsule (75 mg total) by mouth 3 (three) times a day for 10 days    Synthroid 175 MCG tablet TAKE 1 TABLET DAILY FOR POSTPROCEDURAL HYPOTHYROIDISM    traZODone (DESYREL) 50 mg tablet take 0.5 to 2 tablets by mouth daily at bedtime if needed for sleep    valsartan-hydrochlorothiazide (DIOVAN-HCT) 80-12.5 MG per tablet take 1 tablet by mouth once daily     No current facility-administered medications on file prior to visit.       Allergies   Allergen Reactions    Avocado (Diagnostic) - Food Allergy Swelling     Tongue swells    Citrullus Vulgaris Swelling     Tongue swells w/ watermelon    Pineapple - Food Allergy Swelling     Tongue swells    Sulfa Antibiotics Rash       Physical Exam    There were no vitals taken for this visit.    Constitutional: normal, well developed, well nourished, alert, in no distress and non-toxic and no overt pain behavior.  Eyes: anicteric  HEENT: grossly intact  Neck: supple, symmetric, trachea midline and no masses   Pulmonary:even and unlabored  Cardiovascular:No edema or pitting edema present  Skin:Normal without rashes or lesions and well hydrated  Psychiatric:Mood and affect appropriate    Musculoskeletal    LUMBAR EXAM     APPEARANCE:  No visible scars  No gross deformity or malalignment     TENDERNESS:  Mild tenderness over bilateral lumbar paraspinal muscles     ROM:  Normal A/PROM of the lumbar spine  Mild Pain with flexion, extension of the lumbar spine    MOTOR TESTING:  Both lower extremities: Tone normal, active range of motion within functional limits with 5/5 motor power  throughout.    SENSORY TESTING:  Intact to light touch        GAIT:  Non-antalgic gait  Able to walk on toes  Able to walk on heels  Balance normal with ambulation     SPECIAL TESTS:  Normal left straight leg raising test  Normal right straight leg raising test  FABERE left normal  FABERE right normal  FADIR left normal  FADIR right normal  SIJ compression test normal bilaterally.   Tigh thurst test normal bilaterally.  Positive sun finger.     Imaging    2/2025    MRI LUMBAR SPINE WITHOUT CONTRAST     INDICATION: M54.42: Lumbago with sciatica, left side  M54.41: Lumbago with sciatica, right side  G89.29: Other chronic pain.     COMPARISON: Relevant examinations including lumbar spine x-ray 1/16/2025.     TECHNIQUE:  Multiplanar, multisequence imaging of the lumbar spine was performed.     IMAGE QUALITY:  Diagnostic.     FINDINGS:     VERTEBRAL SEGMENT AND DISC SPACES:  There are 5 caudal ribless segmented lumbar type vertebra; the last of which designated L5 for the purposes of vertebral segment numbering on this examination.     T9-T10, T10-T11 and T11-T12: Partially included on sagittal images with no significant spinal canal or neural foraminal narrowing evident. Mild to moderate disc degeneration and arthropathy with right anterolateral osteophytosis.     T12-L1: No significant spinal canal or neural foraminal narrowing evident. Mild to moderate disc degeneration and arthropathy with right anterolateral osteophytosis.     L1-L2: Minimal disc degeneration and arthropathy.     L2-L3: Evidence of mild  narrowing of the spinal canal and neural foraminal narrowing of a moderate degree bilaterally due to less than 5 mm retrolisthesis, disc bulge, central disc extrusion with slight cephalad and caudad extent, in combination with   hypertrophic degenerative change of the facet joints and ligamenta flava. Moderate to severe disc degeneration with annular fissuring, old Schmorl's node deformity inferior endplate L2,  and Modic type I signal alteration.     L3-L4: Evidence of mild  narrowing of the spinal canal and neural foraminal narrowing of a mild degree due to disc bulge in combination with hypertrophic degenerative change of the facet joints and ligamenta flava. Mild to moderate disc degeneration with   old Schmorl's node deformities of the adjacent vertebral body endplates, annular fissuring, and Modic type I signal alteration.     L4-L5: Evidence of mild  narrowing of the spinal canal and neural foraminal narrowing of a mild degree due to disc bulge, less than 3 mm anterolisthesis, in combination with hypertrophic degenerative change of the facet joints and ligamenta flava. Mild   disc degeneration.     L5-S1: Evidence of mild  narrowing of the spinal canal and neural foraminal narrowing of a mild degree due to disc bulge in combination with hypertrophic degenerative change of the facet joints and ligamenta flava. Mild disc degeneration with annular   fissuring and Modic type II signal alteration. Increased STIR signal within the right L5 facet suggesting stress reaction edema.     SPINAL CORD: No spinal cord abnormality evident.     EXTRASPINAL STRUCTURES: Multiple hepatic and renal masses characterized benign on the report from 11/24/2024 MRI abdomen. No other significant abnormality evident.     MARROW SIGNAL: Modic signal alteration multiple levels. Increased STIR signal within the right L5 facet suggesting stress reaction edema. No other significant abnormality evident.     IMPRESSION:  Spondylosis associated with spinal canal and neural foraminal narrowing detailed level by level above and most notable at L2-L3.     Increased STIR signal within the right L5 facet suggesting stress reaction edema

## 2025-03-05 PROCEDURE — 88307 TISSUE EXAM BY PATHOLOGIST: CPT | Performed by: PATHOLOGY

## 2025-03-13 ENCOUNTER — OFFICE VISIT (OUTPATIENT)
Dept: OBGYN CLINIC | Facility: CLINIC | Age: 65
End: 2025-03-13

## 2025-03-13 VITALS — HEIGHT: 66 IN | WEIGHT: 234 LBS | BODY MASS INDEX: 37.61 KG/M2

## 2025-03-13 DIAGNOSIS — M67.40 MUCOID CYST OF JOINT: Primary | ICD-10-CM

## 2025-03-13 PROCEDURE — 99024 POSTOP FOLLOW-UP VISIT: CPT | Performed by: SURGERY

## 2025-03-13 NOTE — PROGRESS NOTES
Assessment/Plan:  Patient ID: Chel Isabel 64 y.o. female   Surgery: EXCISION BIOPSY TISSUE LESION/MASS UPPER EXTREMITY, IP arthrotomy + synovectomy + osteoarthromtomy - Left  Date of Surgery: 2/28/2025      Assessment & Plan  Mucoid cyst of joint  Sutures removed today  Pathology confirms mucoid cyst  Begin scar massage  Resume activity as tolerated           Follow Up:  PRN    To Do Next Visit:         CHIEF COMPLAINT:  Chief Complaint   Patient presents with    Follow-up     Patient is here for suture removal         SUBJECTIVE:  Chel Isabel is a 64 y.o. year old female who presents for follow up after EXCISION BIOPSY TISSUE LESION/MASS UPPER EXTREMITY, IP arthrotomy + synovectomy + osteoarthromtomy - Left. Today patient has no pain and denies any issues after surgery.       PHYSICAL EXAMINATION:  General: well developed and well nourished, alert, oriented times 3, and appears comfortable  Psychiatric: Normal    MUSCULOSKELETAL EXAMINATION:  Incision: Clean, dry, intact  Surgery Site: normal, no evidence of infection   Range of Motion: opposition intact and full composite fist possible  Neurovascular status: Neuro intact, good cap refill  Activity Restrictions: No restrictions  Done today: Sutures out        STUDIES REVIEWED:  Pathology - mucoid cyst     LABS REVIEWED:    HgA1c:   Lab Results   Component Value Date    HGBA1C 5.8 (H) 08/07/2024     BMP:   Lab Results   Component Value Date    CALCIUM 9.5 02/20/2025    K 3.9 02/20/2025    CO2 28 02/20/2025     02/20/2025    BUN 21 02/20/2025    CREATININE 0.85 02/20/2025           PROCEDURES PERFORMED:  Procedures  No Procedures performed today    Zuleyma Israel

## 2025-03-13 NOTE — ASSESSMENT & PLAN NOTE
Sutures removed today  Pathology confirms mucoid cyst  Begin scar massage  Resume activity as tolerated

## 2025-03-31 ENCOUNTER — HOSPITAL ENCOUNTER (OUTPATIENT)
Dept: RADIOLOGY | Facility: CLINIC | Age: 65
Discharge: HOME | End: 2025-03-31
Payer: MEDICARE

## 2025-03-31 VITALS
OXYGEN SATURATION: 95 % | TEMPERATURE: 98.3 F | HEART RATE: 71 BPM | RESPIRATION RATE: 18 BRPM | SYSTOLIC BLOOD PRESSURE: 132 MMHG | DIASTOLIC BLOOD PRESSURE: 64 MMHG

## 2025-03-31 DIAGNOSIS — M53.3 SACROILIAC JOINT PAIN: ICD-10-CM

## 2025-03-31 DIAGNOSIS — G62.9 NEUROPATHY: ICD-10-CM

## 2025-03-31 DIAGNOSIS — M54.42 CHRONIC BILATERAL LOW BACK PAIN WITH BILATERAL SCIATICA: ICD-10-CM

## 2025-03-31 DIAGNOSIS — M54.41 CHRONIC BILATERAL LOW BACK PAIN WITH BILATERAL SCIATICA: ICD-10-CM

## 2025-03-31 DIAGNOSIS — G89.29 CHRONIC BILATERAL LOW BACK PAIN WITH BILATERAL SCIATICA: ICD-10-CM

## 2025-03-31 PROCEDURE — 27096 INJECT SACROILIAC JOINT: CPT | Performed by: PHYSICAL MEDICINE & REHABILITATION

## 2025-03-31 RX ORDER — ROPIVACAINE HYDROCHLORIDE 2 MG/ML
3 INJECTION, SOLUTION EPIDURAL; INFILTRATION; PERINEURAL ONCE
Status: COMPLETED | OUTPATIENT
Start: 2025-03-31 | End: 2025-03-31

## 2025-03-31 RX ORDER — LIDOCAINE HYDROCHLORIDE 10 MG/ML
2 INJECTION, SOLUTION EPIDURAL; INFILTRATION; INTRACAUDAL; PERINEURAL ONCE
Status: COMPLETED | OUTPATIENT
Start: 2025-03-31 | End: 2025-03-31

## 2025-03-31 RX ORDER — METHYLPREDNISOLONE ACETATE 80 MG/ML
80 INJECTION, SUSPENSION INTRA-ARTICULAR; INTRALESIONAL; INTRAMUSCULAR; PARENTERAL; SOFT TISSUE ONCE
Status: COMPLETED | OUTPATIENT
Start: 2025-03-31 | End: 2025-03-31

## 2025-03-31 RX ADMIN — ROPIVACAINE HYDROCHLORIDE 3 ML: 2 INJECTION, SOLUTION EPIDURAL; INFILTRATION at 15:02

## 2025-03-31 RX ADMIN — METHYLPREDNISOLONE ACETATE 80 MG: 80 INJECTION, SUSPENSION INTRA-ARTICULAR; INTRALESIONAL; INTRAMUSCULAR; SOFT TISSUE at 15:02

## 2025-03-31 RX ADMIN — LIDOCAINE HYDROCHLORIDE 2 ML: 10 INJECTION, SOLUTION EPIDURAL; INFILTRATION; INTRACAUDAL; PERINEURAL at 15:00

## 2025-03-31 RX ADMIN — IOHEXOL 1 ML: 300 INJECTION, SOLUTION INTRAVENOUS at 15:02

## 2025-03-31 NOTE — H&P
History of Present Illness: The patient is a 65 y.o. female who presents with complaints of left buttock pain    Past Medical History:   Diagnosis Date    Arthritis     Asthma     Back pain     BRCA1 negative     BRCA2 negative     Cancer (HCC)     thyroid cancer    Colon polyp     Disease of thyroid gland     Fibroid     GERD (gastroesophageal reflux disease)     History of COVID-19 01/04/2022    mild cold s/s    Hyperlipidemia     Hypertension     Hypothyroidism     Iron deficiency     Peripheral neuropathy     Pharyngoesophageal dysphagia     PONV (postoperative nausea and vomiting)     Postmenopausal bleeding 11/23/2021    Seasonal allergies     Varicella        Past Surgical History:   Procedure Laterality Date    ADENOIDECTOMY      BREAST BIOPSY      BREAST CYST EXCISION Right 1991    Benign    BREAST LUMPECTOMY      [place] EXC TUMOR SOFT TISS UPPER ARM/ELBOW SUBQ <3CM Left 2/28/2025    Procedure: EXCISION BIOPSY TISSUE LESION/MASS UPPER EXTREMITY, IP arthrotomy + synovectomy + osteoarthromtomy;  Surgeon: Lc Mccann MD;  Location:  MAIN OR;  Service: Orthopedics    [place] HYSTEROSCOPY BX ENDOMETRIUM&/POLYPC W/WO D&C N/A 03/07/2022    Procedure: DILATATION AND CURETTAGE (D&C) WITH HYSTEROSCOPY, polypectomy;  Surgeon: Cynthia Copeland MD;  [facility];  Service: Gynecology    THYROIDECTOMY      TONSILLECTOMY      VARICOSE VEIN SURGERY Bilateral          Current Outpatient Medications:     albuterol (Ventolin HFA) 90 mcg/act inhaler, Inhale 2 puffs every 6 (six) hours as needed for wheezing, Disp: 18 g, Rfl: 1    atorvastatin (LIPITOR) 10 mg tablet, take 1 tablet by mouth once daily, Disp: 90 tablet, Rfl: 1    celecoxib (CeleBREX) 200 mg capsule, take 1 capsule by mouth twice a day if needed for moderate pain, Disp: 60 capsule, Rfl: 0    cetirizine (ZyrTEC) 10 mg tablet, as needed, Disp: , Rfl:     cholecalciferol (VITAMIN D3) 400 units tablet, Take 400 Units by mouth daily, Disp: , Rfl:      famotidine (PEPCID) 40 MG tablet, Take 1 tablet (40 mg total) by mouth daily at bedtime, Disp: 30 tablet, Rfl: 11    fluticasone (FLONASE) 50 mcg/act nasal spray, 2 sprays into each nostril as needed for rhinitis, Disp: 16 g, Rfl: 3    omeprazole (PriLOSEC) 40 MG capsule, take 1 capsule by mouth once daily, Disp: 90 capsule, Rfl: 1    pregabalin (LYRICA) 75 mg capsule, Take 1 capsule (75 mg total) by mouth 3 (three) times a day for 10 days, Disp: 30 capsule, Rfl: 0    Synthroid 175 MCG tablet, TAKE 1 TABLET DAILY FOR POSTPROCEDURAL HYPOTHYROIDISM, Disp: 90 tablet, Rfl: 0    traZODone (DESYREL) 50 mg tablet, take 0.5 to 2 tablets by mouth daily at bedtime if needed for sleep (Patient not taking: No sig reported), Disp: 180 tablet, Rfl: 1    valsartan-hydrochlorothiazide (DIOVAN-HCT) 80-12.5 MG per tablet, take 1 tablet by mouth once daily, Disp: 90 tablet, Rfl: 1    Current Facility-Administered Medications:     iohexol (OMNIPAQUE) 300 mg/mL injection 1 mL, 1 mL, Intra-articular, Once, Fredis Carlton DO    lidocaine (PF) (XYLOCAINE-MPF) 1 % injection 2 mL, 2 mL, Infiltration, Once, Fredis Carlton DO    methylPREDNISolone acetate (DEPO-MEDROL) injection 80 mg, 80 mg, Intra-articular, Once, Fredis Carlton DO    ropivacaine (NAROPIN) injection 3 mL, 3 mL, Intra-articular, Once, Fredis Carlton DO    Allergies   Allergen Reactions    Avocado (Diagnostic) - Food Allergy Swelling     Tongue swells    Citrullus Vulgaris Swelling     Tongue swells w/ watermelon    Pineapple - Food Allergy Swelling     Tongue swells    Sulfa Antibiotics Rash       Physical Exam: There were no vitals filed for this visit.  General: Awake, Alert, Oriented x 3, Mood and affect appropriate  Respiratory: Respirations even and unlabored  Cardiovascular: Peripheral pulses intact; no edema  Musculoskeletal Exam: tenderness to palpation left glutes    ASA Score: 2         Assessment:   1. Chronic bilateral low back pain with  bilateral sciatica    2. Neuropathy    3. Sacroiliac joint pain        Plan: Left SIJ inj

## 2025-03-31 NOTE — DISCHARGE INSTR - LAB

## 2025-04-14 ENCOUNTER — TELEPHONE (OUTPATIENT)
Dept: PAIN MEDICINE | Facility: CLINIC | Age: 65
End: 2025-04-14

## 2025-04-17 DIAGNOSIS — K21.9 GASTROESOPHAGEAL REFLUX DISEASE, UNSPECIFIED WHETHER ESOPHAGITIS PRESENT: ICD-10-CM

## 2025-04-17 RX ORDER — OMEPRAZOLE 40 MG/1
40 CAPSULE, DELAYED RELEASE ORAL DAILY
Qty: 90 CAPSULE | Refills: 1 | Status: SHIPPED | OUTPATIENT
Start: 2025-04-17

## 2025-04-22 ENCOUNTER — OFFICE VISIT (OUTPATIENT)
Dept: PAIN MEDICINE | Facility: CLINIC | Age: 65
End: 2025-04-22
Payer: MEDICARE

## 2025-04-22 DIAGNOSIS — M48.061 FORAMINAL STENOSIS OF LUMBAR REGION: ICD-10-CM

## 2025-04-22 DIAGNOSIS — M54.42 CHRONIC BILATERAL LOW BACK PAIN WITH BILATERAL SCIATICA: ICD-10-CM

## 2025-04-22 DIAGNOSIS — M54.41 CHRONIC BILATERAL LOW BACK PAIN WITH BILATERAL SCIATICA: ICD-10-CM

## 2025-04-22 DIAGNOSIS — G89.29 CHRONIC BILATERAL LOW BACK PAIN WITH BILATERAL SCIATICA: ICD-10-CM

## 2025-04-22 DIAGNOSIS — M54.16 LUMBAR RADICULOPATHY: ICD-10-CM

## 2025-04-22 DIAGNOSIS — G89.4 CHRONIC PAIN SYNDROME: ICD-10-CM

## 2025-04-22 DIAGNOSIS — M48.061 SPINAL STENOSIS OF LUMBAR REGION WITHOUT NEUROGENIC CLAUDICATION: ICD-10-CM

## 2025-04-22 DIAGNOSIS — M53.3 SACROILIAC JOINT PAIN: Primary | ICD-10-CM

## 2025-04-22 PROCEDURE — G2211 COMPLEX E/M VISIT ADD ON: HCPCS | Performed by: NURSE PRACTITIONER

## 2025-04-22 PROCEDURE — 99214 OFFICE O/P EST MOD 30 MIN: CPT | Performed by: NURSE PRACTITIONER

## 2025-04-22 RX ORDER — PREGABALIN 75 MG/1
75 CAPSULE ORAL 3 TIMES DAILY
Qty: 90 CAPSULE | Refills: 1 | Status: SHIPPED | OUTPATIENT
Start: 2025-04-22

## 2025-04-22 NOTE — PROGRESS NOTES
"Assessment:  1. Sacroiliac joint pain    2. Spinal stenosis of lumbar region without neurogenic claudication    3. Foraminal stenosis of lumbar region    4. Lumbar radiculopathy    5. Chronic pain syndrome    6. Chronic bilateral low back pain with bilateral sciatica        Plan:  While the patient was in the office today, I did have a thorough conversation regarding their chronic pain syndrome, medication management, and treatment plan options.    65 y.o. female who presents to [facility] for interval re-evaluation in regards to pain in the Low back. The patients last office visit was 3/4/2025. Patient presents today with pain in low back pain that radiates to posterior legs mostly thighs.  She reports the pain goes across her low back.  She also states she has pins and needles of her lower legs and feet that seems like \"neuropathy\".  She reports minimal relief from her injection of her SI joint on 3/31/2025.  She reports her PCP started her on pregabalin 50 mg and had increased to 75 mg and she was having some relief with this but the prescription was only for 10 days so she stopped taking it.  Pain is described as Constant and Dull-aching. Symptoms are worse standing and walking. Alleviating factors identifiable by the patient are sitting, rest. On the numeric pain scale of 1-10, the pain typically increases to max of 5 out of 10, which is currently impacting their quality of life.    Had discussion with patient about doing an epidural injection in her lower lumbar but she wants to hold off at this time.  Since she had some relief with the pregabalin 75 mg 3 times daily we will restart this and reevaluate her in 4 weeks.  She said if she is not having relief from this at that time she would consider doing an injection.    Follow-up is planned in 4 weeks or sooner as warranted. The patient was advised to contact the office should their symptoms worsen in the interim.     My impressions " "and treatment recommendations were discussed in detail with the patient who verbalized understanding and had no further questions.  Discharge instructions were provided. I personally saw and examined the patient and I agree with the above discussed plan of care.    No orders of the defined types were placed in this encounter.    New Medications Ordered This Visit   Medications    pregabalin (LYRICA) 75 mg capsule     Sig: Take 1 capsule (75 mg total) by mouth 3 (three) times a day     Dispense:  90 capsule     Refill:  1       History of Present Illness:  Chel Isabel is a 65 y.o. female who presents to [facility] for interval re-evaluation in regards to pain in the Low back. The patients last office visit was 3/4/2025. Patient presents today with pain in low back pain that radiates to posterior legs mostly thighs .  She reports the pain goes across her low back.  She also states she has pins and needles of her lower legs and feet that seems like \"neuropathy\".  She reports minimal relief from her injection of her SI joint on 3/31/2025.  She reports her PCP started her on pregabalin 50 mg and had increased to 75 mg and she was having some relief with this but the prescription was only for 10 days so she stopped taking it.  Pain is described as Constant and Dull-aching. Symptoms are worse standing and walking. Alleviating factors identifiable by the patient are sitting, rest. On the numeric pain scale of 1-10, the pain typically increases to max of 5 out of 10, which is currently impacting their quality of life.    Current pain meds include: None    Conservative therapy: None  Previous treatments: left SI Joint injection  Date: 3/31/2025;  40% relief for 3 weeks         I have personally reviewed and/or updated the patient's past medical history, past surgical history, family history, social history, current medications, allergies, and vital signs today.     Review of Systems   Respiratory:  " Negative for shortness of breath.    Cardiovascular:  Negative for chest pain.   Gastrointestinal:  Negative for constipation, diarrhea, nausea and vomiting.   Musculoskeletal:  Positive for back pain. Negative for arthralgias, gait problem, joint swelling and myalgias.   Skin:  Negative for rash.   Neurological:  Negative for dizziness, seizures and weakness.   All other systems reviewed and are negative.      Patient Active Problem List   Diagnosis    Allergic rhinitis    Arthropathy, multiple sites    History of malignant neoplasm of thyroid    Essential hypertension    Postoperative hypothyroidism    Neuropathy    Hypercholesteremia    Urge incontinence    Chronic pain of both knees    hx Thyroid cancer    Dysphonia    Gastroesophageal reflux disease    Paresis of right vocal fold    Reflux laryngitis    Glottic insufficiency    Muscle tension dysphonia    Xerostomia    Pharyngoesophageal dysphagia    Hx of total thyroidectomy    Obesity (BMI 35.0-39.9 without comorbidity)    Right wrist pain    Primary osteoarthritis of first carpometacarpal joint of right hand    Sensorineural hearing loss (SNHL) of both ears    Tinnitus of both ears    Venous insufficiency of both lower extremities    Mucoid cyst of joint    Sacroiliac joint pain    Chronic bilateral low back pain with bilateral sciatica       Past Medical History:   Diagnosis Date    Arthritis     Asthma     Back pain     BRCA1 negative     BRCA2 negative     Cancer (HCC)     thyroid cancer    Colon polyp     Disease of thyroid gland     Fibroid     GERD (gastroesophageal reflux disease)     History of COVID-19 01/04/2022    mild cold s/s    Hyperlipidemia     Hypertension     Hypothyroidism     Iron deficiency     Peripheral neuropathy     Pharyngoesophageal dysphagia     PONV (postoperative nausea and vomiting)     Postmenopausal bleeding 11/23/2021    Seasonal allergies     Varicella        Past Surgical History:   Procedure Laterality Date     ADENOIDECTOMY      BREAST BIOPSY      BREAST CYST EXCISION Right 1991    Benign    BREAST LUMPECTOMY      NE EXC TUMOR SOFT TISS UPPER ARM/ELBOW SUBQ <3CM Left 2/28/2025    Procedure: EXCISION BIOPSY TISSUE LESION/MASS UPPER EXTREMITY, IP arthrotomy + synovectomy + osteoarthromtomy;  Surgeon: Lc Mccann MD;  Location: [facility];  Service: Orthopedics    NE HYSTEROSCOPY BX ENDOMETRIUM&/POLYPC W/WO D&C N/A 03/07/2022    Procedure: DILATATION AND CURETTAGE (D&C) WITH HYSTEROSCOPY, polypectomy;  Surgeon: Cynthia Copeland MD;  Location: [facility];  Service: Gynecology    THYROIDECTOMY      TONSILLECTOMY      VARICOSE VEIN SURGERY Bilateral        Family History   Problem Relation Age of Onset    Breast cancer Mother 50    Heart disease Mother     Heart disease Father     Stroke Father     Kidney disease Father     Breast cancer Sister 50    Hypertension Sister     No Known Problems Daughter     No Known Problems Daughter     Diabetes unspecified Maternal Grandmother     Prostate cancer Maternal Grandfather     Diabetes Maternal Aunt     Diabetes Maternal Uncle     Diabetes Paternal Aunt     Breast cancer Cousin 45       Social History     Occupational History    Not on file   Tobacco Use    Smoking status: Never     Passive exposure: Never    Smokeless tobacco: Never   Vaping Use    Vaping status: Never Used   Substance and Sexual Activity    Alcohol use: Not Currently     Comment: Only on holidays    Drug use: Never    Sexual activity: Yes     Partners: Male     Birth control/protection: Post-menopausal       Current Outpatient Medications on File Prior to Visit   Medication Sig    albuterol (Ventolin HFA) 90 mcg/act inhaler Inhale 2 puffs every 6 (six) hours as needed for wheezing    atorvastatin (LIPITOR) 10 mg tablet take 1 tablet by mouth once daily    celecoxib (CeleBREX) 200 mg capsule take 1 capsule by mouth twice a day if needed for moderate pain    cetirizine (ZyrTEC) 10 mg tablet as needed     cholecalciferol (VITAMIN D3) 400 units tablet Take 400 Units by mouth daily    famotidine (PEPCID) 40 MG tablet take 1 tablet by mouth at bedtime    fluticasone (FLONASE) 50 mcg/act nasal spray 2 sprays into each nostril as needed for rhinitis    omeprazole (PriLOSEC) 40 MG capsule take 1 capsule by mouth once daily    Synthroid 175 MCG tablet TAKE 1 TABLET DAILY FOR POSTPROCEDURAL HYPOTHYROIDISM    valsartan-hydrochlorothiazide (DIOVAN-HCT) 80-12.5 MG per tablet take 1 tablet by mouth once daily    traZODone (DESYREL) 50 mg tablet take 0.5 to 2 tablets by mouth daily at bedtime if needed for sleep (Patient not taking: No sig reported)    [DISCONTINUED] pregabalin (LYRICA) 75 mg capsule Take 1 capsule (75 mg total) by mouth 3 (three) times a day for 10 days     No current facility-administered medications on file prior to visit.       Allergies   Allergen Reactions    Avocado (Diagnostic) - Food Allergy Swelling     Tongue swells    Citrullus Vulgaris Swelling     Tongue swells w/ watermelon    Pineapple - Food Allergy Swelling     Tongue swells    Sulfa Antibiotics Rash       Physical Exam:    There were no vitals taken for this visit.    Constitutional:normal, well developed, well nourished, alert, in no distress and non-toxic and no overt pain behavior. and overweight  Eyes:anicteric  HEENT:grossly intact  Neck:supple, symmetric, trachea midline and no masses   Pulmonary:even and unlabored  Cardiovascular:No edema or pitting edema present  Skin:Normal without rashes or lesions and well hydrated  Psychiatric:Mood and affect appropriate  Neurologic:Cranial Nerves II-XII grossly intact  Musculoskeletal:antalgic    Lumbar Spine Exam    Appearance:  Reversal of lordosis  Palpation/Tenderness:  left lumbar paraspinal tenderness  right lumbar paraspinal tenderness  left sacroiliac joint tenderness  Sensory:  no sensory deficits noted  Range of Motion:  Flexion:  Minimally limited  with pain  Extension:  Minimally  limited  with pain  Motor Strength:  Left hip flexion:  5/5  Right hip flexion:  5/5  Left knee flexion:  5/5  Left knee extension:  5/5  Right knee flexion:  5/5  Right knee extension:  5/5  Special Tests:  Left SLR: negative  Right SLR: negative  Left MICK's Maneuver:  negative     Imaging - I have reviewed the results of the most recent MRI       MRI lumbar 2/6/2025  VERTEBRAL SEGMENT AND DISC SPACES:  There are 5 caudal ribless segmented lumbar type vertebra; the last of which designated L5 for the purposes of vertebral segment numbering on this examination.     T9-T10, T10-T11 and T11-T12: Partially included on sagittal images with no significant spinal canal or neural foraminal narrowing evident. Mild to moderate disc degeneration and arthropathy with right anterolateral osteophytosis.     T12-L1: No significant spinal canal or neural foraminal narrowing evident. Mild to moderate disc degeneration and arthropathy with right anterolateral osteophytosis.     L1-L2: Minimal disc degeneration and arthropathy.     L2-L3: Evidence of mild  narrowing of the spinal canal and neural foraminal narrowing of a moderate degree bilaterally due to less than 5 mm retrolisthesis, disc bulge, central disc extrusion with slight cephalad and caudad extent, in combination with   hypertrophic degenerative change of the facet joints and ligamenta flava. Moderate to severe disc degeneration with annular fissuring, old Schmorl's node deformity inferior endplate L2, and Modic type I signal alteration.     L3-L4: Evidence of mild  narrowing of the spinal canal and neural foraminal narrowing of a mild degree due to disc bulge in combination with hypertrophic degenerative change of the facet joints and ligamenta flava. Mild to moderate disc degeneration with   old Schmorl's node deformities of the adjacent vertebral body endplates, annular fissuring, and Modic type I signal alteration.     L4-L5: Evidence of mild  narrowing of the spinal  canal and neural foraminal narrowing of a mild degree due to disc bulge, less than 3 mm anterolisthesis, in combination with hypertrophic degenerative change of the facet joints and ligamenta flava. Mild   disc degeneration.     L5-S1: Evidence of mild  narrowing of the spinal canal and neural foraminal narrowing of a mild degree due to disc bulge in combination with hypertrophic degenerative change of the facet joints and ligamenta flava. Mild disc degeneration with annular   fissuring and Modic type II signal alteration. Increased STIR signal within the right L5 facet suggesting stress reaction edema.     SPINAL CORD: No spinal cord abnormality evident.     EXTRASPINAL STRUCTURES: Multiple hepatic and renal masses characterized benign on the report from 11/24/2024 MRI abdomen. No other significant abnormality evident.     MARROW SIGNAL: Modic signal alteration multiple levels. Increased STIR signal within the right L5 facet suggesting stress reaction edema. No other significant abnormality evident.     IMPRESSION:  Spondylosis associated with spinal canal and neural foraminal narrowing detailed level by level above and most notable at L2-L3.     Increased STIR signal within the right L5 facet suggesting stress reaction edema.      This document was created using speech voice recognition software. Grammatical errors, random word insertions, pronoun errors, and incomplete sentences are an occasional consequence of this system due to software limitations, ambient noise, and hardware issues. Any formal questions or concerns about content, text, or information contained within the body of this dictation should be directly addressed to the provider for clarification.

## 2025-05-14 DIAGNOSIS — E89.0 POSTOPERATIVE HYPOTHYROIDISM: ICD-10-CM

## 2025-05-14 RX ORDER — LEVOTHYROXINE SODIUM 175 MCG
TABLET ORAL
Qty: 90 TABLET | Refills: 1 | Status: SHIPPED | OUTPATIENT
Start: 2025-05-14

## 2025-06-03 ENCOUNTER — OFFICE VISIT (OUTPATIENT)
Dept: PAIN MEDICINE | Facility: CLINIC | Age: 65
End: 2025-06-03
Payer: MEDICARE

## 2025-06-03 DIAGNOSIS — M48.061 FORAMINAL STENOSIS OF LUMBAR REGION: ICD-10-CM

## 2025-06-03 DIAGNOSIS — M54.16 LUMBAR RADICULOPATHY: Primary | ICD-10-CM

## 2025-06-03 DIAGNOSIS — M51.26 LUMBAR DISC HERNIATION: ICD-10-CM

## 2025-06-03 PROCEDURE — G2211 COMPLEX E/M VISIT ADD ON: HCPCS | Performed by: NURSE PRACTITIONER

## 2025-06-03 PROCEDURE — 99214 OFFICE O/P EST MOD 30 MIN: CPT | Performed by: NURSE PRACTITIONER

## 2025-06-03 NOTE — PROGRESS NOTES
Name: Chel Isabel      : 1960      MRN: [id number]  Encounter Provider: IBETH Bhatt  Encounter Date: 6/3/2025   Encounter department: [facility]  :  Assessment & Plan  Lumbar radiculopathy  Lumbar disc herniation  Foraminal stenosis of lumbar region  Patient's main complaint is low back pain with radicular symptoms down left leg.  Will schedule her for a left L5-S1, S1 TFESI with Dr. Carlton.    Patient with noted swelling of her bilateral lower extremity since starting her pregabalin.  Will recommend taper off this medication at this time.  Recommended to go to 75 mg twice a day 7 days then 75 mg a day for 7 days and then stop.    Orders:    [place] spine and pain procedure; Future        Complete risks and benefits including bleeding, infection, tissue reaction, nerve injury and allergic reaction were discussed. The approach was demonstrated using models and literature was provided. Verbal and written consent was obtained.    My impressions and treatment recommendations were discussed in detail with the patient who verbalized understanding and had no further questions.  Discharge instructions were provided. I personally saw and examined the patient and I agree with the above discussed plan of care.    Follow-up is planned post injection or sooner as warranted. The patient was advised to contact the office should their symptoms worsen in the interim.     History of Present Illness     Chel Isabel is a 65 y.o. female who presents to [facility] for interval re-evaluation in regards to pain in the Low back. The patients last office visit was 2025. Patient presents today with pain in low back that radiates to posterior left leg. She also describes pins and needles of B/L LE from knees to feet, but her EMG is not scheduled uyntil 2025. Pain is described as Constant, Dull-aching, Sharp, Shooting, and Pins & Needles. Symptoms are worse with walking,  bending, standing. Alleviating factors identifiable by the patient are sitting. On the numeric pain scale of 1-10, the pain typically increases to max of 7 out of 10, which is currently impacting their quality of life.    Patient reports since starting the pregabalin she has had increased swelling of her lower extremities and her feet.    Current pain meds include: Pregabalin    Conservative therapy: None  Previous treatments: bilateral SI Joint injection  Date: 3/31/2025;  40% relief for 2 months     Review of Systems   Constitutional: Negative.    Respiratory: Negative.     Cardiovascular: Negative.    Gastrointestinal: Negative.    Musculoskeletal:  Positive for back pain and gait problem.   Skin: Negative.    Neurological:         Pins and needles B/L knees to feet   All other systems reviewed and are negative.      Medical History Reviewed by provider this encounter:  Tobacco  Allergies  Meds  Problems  Med Hx  Surg Hx  Fam Hx     .       Objective   There were no vitals taken for this visit.     Pain Score:   7  Physical Exam  Constitutional:normal, well developed, well nourished, alert, in no distress and non-toxic and no overt pain behavior. and overweight  Eyes:anicteric  HEENT:grossly intact  Neck:supple, symmetric, trachea midline and no masses   Pulmonary:even and unlabored  Cardiovascular:No edema or pitting edema present  Skin:Normal without rashes or lesions and well hydrated  Psychiatric:Mood and affect appropriate  Neurologic:Cranial Nerves II-XII grossly intact  Musculoskeletal: antalgic gait     B/L lumbar paraspinal tenderness  Negative SI joint tenderness    Radiology Results Review: I have reviewed radiology reports from 2/6/2025 including: MRI spine.

## 2025-06-04 ENCOUNTER — RESULTS FOLLOW-UP (OUTPATIENT)
Dept: FAMILY MEDICINE CLINIC | Facility: CLINIC | Age: 65
End: 2025-06-04

## 2025-06-04 ENCOUNTER — APPOINTMENT (OUTPATIENT)
Dept: LAB | Age: 65
End: 2025-06-04
Attending: FAMILY MEDICINE
Payer: MEDICARE

## 2025-06-04 DIAGNOSIS — R73.03 PREDIABETES: ICD-10-CM

## 2025-06-04 DIAGNOSIS — E78.00 HYPERCHOLESTEREMIA: ICD-10-CM

## 2025-06-04 DIAGNOSIS — E06.3 HASHIMOTO'S THYROIDITIS: ICD-10-CM

## 2025-06-04 DIAGNOSIS — R73.03 PREDIABETES: Primary | ICD-10-CM

## 2025-06-04 LAB
ALBUMIN SERPL BCG-MCNC: 4.1 G/DL (ref 3.5–5)
ALP SERPL-CCNC: 100 U/L (ref 34–104)
ALT SERPL W P-5'-P-CCNC: 20 U/L (ref 7–52)
ANION GAP SERPL CALCULATED.3IONS-SCNC: 7 MMOL/L (ref 4–13)
AST SERPL W P-5'-P-CCNC: 18 U/L (ref 13–39)
BILIRUB SERPL-MCNC: 0.43 MG/DL (ref 0.2–1)
BUN SERPL-MCNC: 26 MG/DL (ref 5–25)
CALCIUM SERPL-MCNC: 9.4 MG/DL (ref 8.4–10.2)
CHLORIDE SERPL-SCNC: 106 MMOL/L (ref 96–108)
CHOLEST SERPL-MCNC: 141 MG/DL (ref ?–200)
CO2 SERPL-SCNC: 30 MMOL/L (ref 21–32)
CREAT SERPL-MCNC: 0.77 MG/DL (ref 0.6–1.3)
EST. AVERAGE GLUCOSE BLD GHB EST-MCNC: 128 MG/DL
GFR SERPL CREATININE-BSD FRML MDRD: 80 ML/MIN/1.73SQ M
GLUCOSE P FAST SERPL-MCNC: 104 MG/DL (ref 65–99)
HBA1C MFR BLD: 6.1 %
HDLC SERPL-MCNC: 56 MG/DL
LDLC SERPL CALC-MCNC: 65 MG/DL (ref 0–100)
POTASSIUM SERPL-SCNC: 4.3 MMOL/L (ref 3.5–5.3)
PROT SERPL-MCNC: 7.2 G/DL (ref 6.4–8.4)
SODIUM SERPL-SCNC: 143 MMOL/L (ref 135–147)
TRIGL SERPL-MCNC: 101 MG/DL (ref ?–150)
TSH SERPL DL<=0.05 MIU/L-ACNC: 0.52 UIU/ML (ref 0.45–4.5)

## 2025-06-04 PROCEDURE — 80061 LIPID PANEL: CPT

## 2025-06-04 PROCEDURE — 83036 HEMOGLOBIN GLYCOSYLATED A1C: CPT

## 2025-06-04 PROCEDURE — 84443 ASSAY THYROID STIM HORMONE: CPT

## 2025-06-04 PROCEDURE — 80053 COMPREHEN METABOLIC PANEL: CPT

## 2025-06-04 PROCEDURE — 36415 COLL VENOUS BLD VENIPUNCTURE: CPT

## 2025-06-04 RX ORDER — METFORMIN HYDROCHLORIDE 500 MG/1
500 TABLET, EXTENDED RELEASE ORAL
Qty: 90 TABLET | Refills: 1 | Status: SHIPPED | OUTPATIENT
Start: 2025-06-04 | End: 2025-06-11 | Stop reason: SDUPTHER

## 2025-06-11 ENCOUNTER — OFFICE VISIT (OUTPATIENT)
Dept: FAMILY MEDICINE CLINIC | Facility: CLINIC | Age: 65
End: 2025-06-11
Payer: MEDICARE

## 2025-06-11 VITALS
BODY MASS INDEX: 37.28 KG/M2 | HEART RATE: 76 BPM | SYSTOLIC BLOOD PRESSURE: 120 MMHG | TEMPERATURE: 97.5 F | OXYGEN SATURATION: 99 % | DIASTOLIC BLOOD PRESSURE: 84 MMHG | HEIGHT: 66 IN | WEIGHT: 232 LBS

## 2025-06-11 DIAGNOSIS — R92.30 DENSE BREASTS: ICD-10-CM

## 2025-06-11 DIAGNOSIS — H90.3 SENSORINEURAL HEARING LOSS (SNHL) OF BOTH EARS: ICD-10-CM

## 2025-06-11 DIAGNOSIS — E66.812 CLASS 2 SEVERE OBESITY WITH SERIOUS COMORBIDITY AND BODY MASS INDEX (BMI) OF 38.0 TO 38.9 IN ADULT, UNSPECIFIED OBESITY TYPE (HCC): ICD-10-CM

## 2025-06-11 DIAGNOSIS — Z79.899 OTHER LONG TERM (CURRENT) DRUG THERAPY: ICD-10-CM

## 2025-06-11 DIAGNOSIS — M54.42 CHRONIC BILATERAL LOW BACK PAIN WITH BILATERAL SCIATICA: ICD-10-CM

## 2025-06-11 DIAGNOSIS — Z00.00 MEDICARE ANNUAL WELLNESS VISIT, SUBSEQUENT: Primary | ICD-10-CM

## 2025-06-11 DIAGNOSIS — Z12.83 SKIN EXAM FOR MALIGNANT NEOPLASM: ICD-10-CM

## 2025-06-11 DIAGNOSIS — G89.29 CHRONIC BILATERAL LOW BACK PAIN WITH BILATERAL SCIATICA: ICD-10-CM

## 2025-06-11 DIAGNOSIS — E89.0 POSTOPERATIVE HYPOTHYROIDISM: ICD-10-CM

## 2025-06-11 DIAGNOSIS — Z51.81 MEDICATION MONITORING ENCOUNTER: ICD-10-CM

## 2025-06-11 DIAGNOSIS — K21.9 GASTROESOPHAGEAL REFLUX DISEASE, UNSPECIFIED WHETHER ESOPHAGITIS PRESENT: ICD-10-CM

## 2025-06-11 DIAGNOSIS — J30.2 SEASONAL ALLERGIES: ICD-10-CM

## 2025-06-11 DIAGNOSIS — E66.01 CLASS 2 SEVERE OBESITY WITH SERIOUS COMORBIDITY AND BODY MASS INDEX (BMI) OF 38.0 TO 38.9 IN ADULT, UNSPECIFIED OBESITY TYPE (HCC): ICD-10-CM

## 2025-06-11 DIAGNOSIS — F41.9 ANXIETY DUE TO INVASIVE PROCEDURE: ICD-10-CM

## 2025-06-11 DIAGNOSIS — I10 ESSENTIAL HYPERTENSION: ICD-10-CM

## 2025-06-11 DIAGNOSIS — E78.00 HYPERCHOLESTEREMIA: ICD-10-CM

## 2025-06-11 DIAGNOSIS — G47.10 EXCESSIVE SLEEPINESS: ICD-10-CM

## 2025-06-11 DIAGNOSIS — J45.20 MILD INTERMITTENT REACTIVE AIRWAY DISEASE WITHOUT COMPLICATION: ICD-10-CM

## 2025-06-11 DIAGNOSIS — R93.89 ENDOMETRIAL THICKENING ON ULTRASOUND: ICD-10-CM

## 2025-06-11 DIAGNOSIS — Z12.31 ENCOUNTER FOR SCREENING MAMMOGRAM FOR BREAST CANCER: ICD-10-CM

## 2025-06-11 DIAGNOSIS — M54.41 CHRONIC BILATERAL LOW BACK PAIN WITH BILATERAL SCIATICA: ICD-10-CM

## 2025-06-11 DIAGNOSIS — R73.03 PREDIABETES: ICD-10-CM

## 2025-06-11 PROBLEM — R49.0 MUSCLE TENSION DYSPHONIA: Status: RESOLVED | Noted: 2022-04-11 | Resolved: 2025-06-11

## 2025-06-11 PROBLEM — Z90.89 HX OF TOTAL THYROIDECTOMY: Status: RESOLVED | Noted: 2022-04-11 | Resolved: 2025-06-11

## 2025-06-11 PROBLEM — Z98.890 HX OF TOTAL THYROIDECTOMY: Status: RESOLVED | Noted: 2022-04-11 | Resolved: 2025-06-11

## 2025-06-11 PROCEDURE — G2211 COMPLEX E/M VISIT ADD ON: HCPCS | Performed by: FAMILY MEDICINE

## 2025-06-11 PROCEDURE — G0439 PPPS, SUBSEQ VISIT: HCPCS | Performed by: FAMILY MEDICINE

## 2025-06-11 PROCEDURE — 99214 OFFICE O/P EST MOD 30 MIN: CPT | Performed by: FAMILY MEDICINE

## 2025-06-11 RX ORDER — OMEPRAZOLE 40 MG/1
40 CAPSULE, DELAYED RELEASE ORAL DAILY
Qty: 90 CAPSULE | Refills: 3 | Status: SHIPPED | OUTPATIENT
Start: 2025-06-11

## 2025-06-11 RX ORDER — METFORMIN HYDROCHLORIDE 500 MG/1
500 TABLET, EXTENDED RELEASE ORAL
Qty: 90 TABLET | Refills: 3 | Status: SHIPPED | OUTPATIENT
Start: 2025-06-11

## 2025-06-11 RX ORDER — FLUTICASONE PROPIONATE 50 MCG
2 SPRAY, SUSPENSION (ML) NASAL AS NEEDED
Qty: 16 G | Refills: 3 | Status: SHIPPED | OUTPATIENT
Start: 2025-06-11

## 2025-06-11 RX ORDER — DIAZEPAM 5 MG/1
5 TABLET ORAL
Qty: 1 TABLET | Refills: 0 | Status: SHIPPED | OUTPATIENT
Start: 2025-06-11

## 2025-06-11 RX ORDER — ALBUTEROL SULFATE 90 UG/1
2 INHALANT RESPIRATORY (INHALATION) EVERY 6 HOURS PRN
Qty: 18 G | Refills: 1 | Status: SHIPPED | OUTPATIENT
Start: 2025-06-11

## 2025-06-11 RX ORDER — ATORVASTATIN CALCIUM 10 MG/1
10 TABLET, FILM COATED ORAL DAILY
Qty: 90 TABLET | Refills: 3 | Status: SHIPPED | OUTPATIENT
Start: 2025-06-11

## 2025-06-11 RX ORDER — VALSARTAN AND HYDROCHLOROTHIAZIDE 80; 12.5 MG/1; MG/1
1 TABLET, FILM COATED ORAL DAILY
Qty: 90 TABLET | Refills: 3 | Status: SHIPPED | OUTPATIENT
Start: 2025-06-11

## 2025-06-11 NOTE — ASSESSMENT & PLAN NOTE
Chronic, stable  Continue atorvastatin 10mg qd   Repeat lipids 1 year   Orders:  •  atorvastatin (LIPITOR) 10 mg tablet; Take 1 tablet (10 mg total) by mouth daily

## 2025-06-11 NOTE — PROGRESS NOTES
Name: Chel Isabel      : 1960      MRN: [id number]  Encounter Provider: Megan Garcia MD  Encounter Date: 2025   Encounter department: [facility]  :  Assessment & Plan  Medicare annual wellness visit, subsequent         Mild intermittent reactive airway disease without complication    Orders:  •  albuterol (Ventolin HFA) 90 mcg/act inhaler; Inhale 2 puffs every 6 (six) hours as needed for wheezing    Seasonal allergies    Orders:  •  fluticasone (FLONASE) 50 mcg/act nasal spray; 2 sprays into each nostril as needed for rhinitis    Encounter for screening mammogram for breast cancer    Orders:  •  Mammo screening bilateral w 3d and cad; Future  •  US breast screening bilateral complete (ABUS); Future    Dense breasts    Orders:  •  Mammo screening bilateral w 3d and cad; Future  •  US breast screening bilateral complete (ABUS); Future    Endometrial thickening on ultrasound  Continue to monitor, per OB, annually with imaging   Orders:  •  US pelvis complete w transvaginal; Future    Prediabetes  Continue metformin 500mg XR  Check B12 given starting metformin   Repeat A1c in 3-6 months   Orders:  •  Hemoglobin A1C; Future  •  metFORMIN (GLUCOPHAGE-XR) 500 mg 24 hr tablet; Take 1 tablet (500 mg total) by mouth daily with dinner    Skin exam for malignant neoplasm  Full body exam for screening desired. Pt prefers female dermatologist.   Orders:  •  Ambulatory Referral to Dermatology; Future    Excessive sleepiness  STOP-BAN  [place]: 12  Recommend home sleep study to screen for sleep apnea given significant daytime fatigue, non-restorative sleep, and symptoms as indicated by scales above   Orders:  •  Ambulatory Referral to Sleep Medicine; Future    Anxiety due to invasive procedure  Recommend Valium to help with anxiety before DANIEL   PDMP appropriate   Orders:  •  diazepam (VALIUM) 5 mg tablet; Take 1 tablet (5 mg total) by mouth 60 minutes pre-procedure    Essential  hypertension  Chronic, stable   Continue Diovan-HCT 80-12.5mg qd   DASH Diet   Orders:  •  valsartan-hydrochlorothiazide (DIOVAN-HCT) 80-12.5 MG per tablet; Take 1 tablet by mouth daily    Postoperative hypothyroidism  Chronic, stable  Continue management by endocrinology  Currently on Synthroid 175mcg        Chronic bilateral low back pain with bilateral sciatica  Chronic - managed by pain management   Has upcoming DANIEL       Sensorineural hearing loss (SNHL) of both ears  Chronic, stable  Wears bilateral hearing aides        Class 2 severe obesity with serious comorbidity and body mass index (BMI) of 38.0 to 38.9 in adult, unspecified obesity type (HCC)  Encouraged heart-healthy, diabetic diet and 150 min exercise weekly          Hypercholesteremia  Chronic, stable  Continue atorvastatin 10mg qd   Repeat lipids 1 year   Orders:  •  atorvastatin (LIPITOR) 10 mg tablet; Take 1 tablet (10 mg total) by mouth daily    Gastroesophageal reflux disease, unspecified whether esophagitis present    Orders:  •  omeprazole (PriLOSEC) 40 MG capsule; Take 1 capsule (40 mg total) by mouth daily    Medication monitoring encounter    Orders:  •  Vitamin B12; Future    Other long term (current) drug therapy    Orders:  •  Vitamin B12; Future       Preventive health issues were discussed with patient, and age appropriate screening tests were ordered as noted in patient's After Visit Summary. Personalized health advice and appropriate referrals for health education or preventive services given if needed, as noted in patient's After Visit Summary.    History of Present Illness     Patient overall feels well.   Requests I order GYN imaging, as she only sees her OBGYN q2 years due to Medicare rules.   She has several moles on her back she would like evaluated.   She would like my input on her upcoming lumbar DANIEL procedure. She has a lot of anxiety about the procedure.  She has ongoing significant daily sleepiness. Will nod off easily at  home while reading or watching TV, while a passenger in a vehicle, and also has nodded off at a red light while driving.   Wants to clarify Vit D directions and EKG results from February.        Patient Care Team:  Megan Garcia MD as PCP - General (Family Medicine)  Shane Vazquez MD as PCP - Endocrinology (Endocrinology)  Cynthia Henning-Luna Copeland MD (Obstetrics and Gynecology)  Kamila Daniels MD (Vascular Surgery)    Review of Systems   Constitutional:  Negative for chills and fever.   HENT:  Negative for congestion and sore throat.    Eyes:  Negative for pain and visual disturbance.   Respiratory:  Negative for cough and shortness of breath.    Cardiovascular:  Negative for chest pain and palpitations.   Gastrointestinal:  Negative for abdominal pain and nausea.   Genitourinary:  Negative for dysuria.   Musculoskeletal:  Negative for arthralgias and myalgias.   Skin:  Negative for rash and wound.   Neurological:  Negative for dizziness and headaches.   All other systems reviewed and are negative.    Medical History Reviewed by provider this encounter:  Tobacco  Allergies  Meds  Problems  Med Hx  Surg Hx  Fam Hx       Annual Wellness Visit Questionnaire   Chel is here for her Subsequent Wellness visit.     Health Risk Assessment:   Patient rates overall health as fair. Patient feels that their physical health rating is slightly worse. Patient is satisfied with their life. Eyesight was rated as same. Hearing was rated as same. Patient feels that their emotional and mental health rating is same. Patients states they are never, rarely angry. Patient states they are often unusually tired/fatigued. Pain experienced in the last 7 days has been some. Patient's pain rating has been 7/10. Patient states that she has experienced no weight loss or gain in last 6 months.     Depression Screening:   PHQ-2 Score: 0      Fall Risk Screening:   In the past year, patient has experienced: no history of falling  in past year      Urinary Incontinence Screening:   Patient has leaked urine accidently in the last six months.     Home Safety:  Patient has trouble with stairs inside or outside of their home. Patient has working smoke alarms and has working carbon monoxide detector. Home safety hazards include: none.     Nutrition:   Current diet is Unhealthy.     Medications:   Patient is not currently taking any over-the-counter supplements. Patient is able to manage medications.     Activities of Daily Living (ADLs)/Instrumental Activities of Daily Living (IADLs):   Walk and transfer into and out of bed and chair?: Yes  Dress and groom yourself?: Yes    Bathe or shower yourself?: Yes    Feed yourself? Yes  Do your laundry/housekeeping?: Yes  Manage your money, pay your bills and track your expenses?: Yes  Make your own meals?: Yes    Do your own shopping?: Yes    Previous Hospitalizations:   Any hospitalizations or ED visits within the last 12 months?: No      Advance Care Planning:   Living will: No    Durable POA for healthcare: No    Advanced directive: No    Advanced directive counseling given: Yes    ACP document given: Yes      Cognitive Screening:   Provider or family/friend/caregiver concerned regarding cognition?: No    Preventive Screenings      Cardiovascular Screening:    General: Screening Not Indicated and History Lipid Disorder      Diabetes Screening:     General: Screening Current      Colorectal Cancer Screening:     General: Screening Current      Breast Cancer Screening:     General: Screening Current      Cervical Cancer Screening:    General: Screening Not Indicated      Osteoporosis Screening:    General: Screening Current      Abdominal Aortic Aneurysm (AAA) Screening:        General: Screening Not Indicated      Lung Cancer Screening:     General: Screening Not Indicated      Hepatitis C Screening:    General: Screening Current    Screening, Brief Intervention, and Referral to Treatment (SBIRT)  "    Screening  Typical number of drinks in a day: 0  Typical number of drinks in a week: 0  Interpretation: Low risk drinking behavior.    AUDIT-C Screenin) How often did you have a drink containing alcohol in the past year? never  2) How many drinks did you have on a typical day when you were drinking in the past year? 0  3) How often did you have 6 or more drinks on one occasion in the past year? never    AUDIT-C Score: 0  Interpretation: Score 0-2 (female): Negative screen for alcohol misuse    Single Item Drug Screening:  How often have you used an illegal drug (including marijuana) or a prescription medication for non-medical reasons in the past year? never    Single Item Drug Screen Score: 0  Interpretation: Negative screen for possible drug use disorder    Brief Intervention  Alcohol & drug use screenings were reviewed. No concerns regarding substance use disorder identified.     Other Counseling Topics:   Calcium and vitamin D intake and regular weightbearing exercise.     Social Drivers of Health     Food Insecurity: No Food Insecurity (6/10/2025)    Nursing - Inadequate Food Risk Classification    • Worried About Running Out of Food in the Last Year: Never true    • Ran Out of Food in the Last Year: Never true   Transportation Needs: No Transportation Needs (6/10/2025)    PRAPARE - Transportation    • Lack of Transportation (Medical): No    • Lack of Transportation (Non-Medical): No   Housing Stability: Low Risk  (6/10/2025)    Housing Stability Vital Sign    • Unable to Pay for Housing in the Last Year: No    • Number of Times Moved in the Last Year: 0    • Homeless in the Last Year: No   Utilities: Not At Risk (6/10/2025)    [facility] Utilities    • Threatened with loss of utilities: No     No results found.    Objective   /84   Pulse 76   Temp 97.5 °F (36.4 °C)   Ht 5' 5.5\" (1.664 m)   Wt 105 kg (232 lb)   SpO2 99%   BMI 38.02 kg/m²     Physical Exam  Vitals and nursing note reviewed. "   Constitutional:       General: She is not in acute distress.     Appearance: She is well-developed. She is not ill-appearing.   HENT:      Head: Normocephalic and atraumatic.      Right Ear: Tympanic membrane, ear canal and external ear normal. No middle ear effusion.      Left Ear: Tympanic membrane, ear canal and external ear normal.  No middle ear effusion.      Nose: Nose normal. No congestion or rhinorrhea.      Mouth/Throat:      Lips: Pink.      Mouth: Mucous membranes are moist.      Pharynx: Oropharynx is clear. Uvula midline. No oropharyngeal exudate.      Tonsils: No tonsillar exudate.     Eyes:      General: Lids are normal.      Extraocular Movements: Extraocular movements intact.      Conjunctiva/sclera: Conjunctivae normal.      Pupils: Pupils are equal, round, and reactive to light.     Neck:      Thyroid: No thyromegaly.      Trachea: No tracheal deviation.     Cardiovascular:      Rate and Rhythm: Normal rate and regular rhythm.      Pulses: Normal pulses.      Heart sounds: Normal heart sounds, S1 normal and S2 normal. No murmur heard.  Pulmonary:      Effort: Pulmonary effort is normal. No respiratory distress.      Breath sounds: Normal breath sounds. No decreased breath sounds, wheezing, rhonchi or rales.   Abdominal:      General: Bowel sounds are normal. There is no distension.      Palpations: Abdomen is soft.      Tenderness: There is no abdominal tenderness.     Musculoskeletal:      Right lower leg: No edema.      Left lower leg: No edema.   Lymphadenopathy:      Cervical: No cervical adenopathy.     Skin:     General: Skin is warm and dry.      Capillary Refill: Capillary refill takes less than 2 seconds.      Comments: Lentigo, seborrheic keratoses across upper back. No concerning lesions detected.      Neurological:      Mental Status: She is alert and oriented to person, place, and time.      Deep Tendon Reflexes: Reflexes normal.      Reflex Scores:       Patellar reflexes are 2+  on the right side and 2+ on the left side.    Psychiatric:         Attention and Perception: Attention normal.         Mood and Affect: Mood normal.         Thought Content: Thought content does not include suicidal ideation.

## 2025-06-11 NOTE — ASSESSMENT & PLAN NOTE
Chronic, stable   Continue Diovan-HCT 80-12.5mg qd   DASH Diet   Orders:  •  valsartan-hydrochlorothiazide (DIOVAN-HCT) 80-12.5 MG per tablet; Take 1 tablet by mouth daily

## 2025-06-12 ENCOUNTER — TELEPHONE (OUTPATIENT)
Age: 65
End: 2025-06-12

## 2025-06-12 ENCOUNTER — TRANSCRIBE ORDERS (OUTPATIENT)
Dept: SLEEP CENTER | Facility: CLINIC | Age: 65
End: 2025-06-12

## 2025-06-12 DIAGNOSIS — G47.8 OTHER SLEEP DISORDERS: Primary | ICD-10-CM

## 2025-06-12 DIAGNOSIS — G47.10 EXCESSIVE SLEEPINESS: ICD-10-CM

## 2025-06-12 NOTE — TELEPHONE ENCOUNTER
PA diazepam (VALIUM) 5 mg APPROVED         Patient advised by          []MyChart Message  []Phone call   []LMOM  []L/M to call office as no active Communication consent on file  []Unable to leave detailed message as VM not approved on Communication consent       Pharmacy advised by    [x]Fax  [x]Phone call  []Secure Chat    Specialty Pharmacy    []

## 2025-06-12 NOTE — TELEPHONE ENCOUNTER
PA diazepam (VALIUM) 5 mg SUBMITTED    to [employer]     via    []CMM-KEY:    [x]Surescripts-Case ID # [id number]  []Availity-Auth ID #  NDC #    []Faxed to plan   []Other website    []Phone call Case ID #      []PA sent as URGENT    All office notes, labs and other pertaining documents and studies sent. Clinical questions answered. Awaiting determination from insurance company.     Turnaround time for your insurance to make a decision on your Prior Authorization can take 7-21 business days.

## 2025-06-17 ENCOUNTER — HOSPITAL ENCOUNTER (OUTPATIENT)
Dept: RADIOLOGY | Age: 65
Discharge: HOME | End: 2025-06-17
Attending: FAMILY MEDICINE
Payer: MEDICARE

## 2025-06-17 DIAGNOSIS — R93.89 ENDOMETRIAL THICKENING ON ULTRASOUND: ICD-10-CM

## 2025-06-17 PROCEDURE — 76830 TRANSVAGINAL US NON-OB: CPT

## 2025-06-17 PROCEDURE — 76856 US EXAM PELVIC COMPLETE: CPT

## 2025-06-18 ENCOUNTER — TELEPHONE (OUTPATIENT)
Age: 65
End: 2025-06-18

## 2025-06-18 NOTE — TELEPHONE ENCOUNTER
Caller: Patient     Doctor/Office: Dr Carlton     Call regarding :  Returning call from  regarding upcoming procedure      Call was transferred to: Procedure

## 2025-07-05 ENCOUNTER — OFFICE VISIT (OUTPATIENT)
Dept: URGENT CARE | Facility: MEDICAL CENTER | Age: 65
End: 2025-07-05
Payer: MEDICARE

## 2025-07-05 VITALS
HEART RATE: 80 BPM | TEMPERATURE: 98 F | RESPIRATION RATE: 18 BRPM | SYSTOLIC BLOOD PRESSURE: 120 MMHG | DIASTOLIC BLOOD PRESSURE: 82 MMHG | OXYGEN SATURATION: 98 %

## 2025-07-05 DIAGNOSIS — H01.001 BLEPHARITIS OF RIGHT UPPER EYELID, UNSPECIFIED TYPE: Primary | ICD-10-CM

## 2025-07-05 PROCEDURE — G0463 HOSPITAL OUTPT CLINIC VISIT: HCPCS | Performed by: NURSE PRACTITIONER

## 2025-07-05 PROCEDURE — 99213 OFFICE O/P EST LOW 20 MIN: CPT | Performed by: NURSE PRACTITIONER

## 2025-07-05 RX ORDER — DOXYCYCLINE 100 MG/1
100 TABLET ORAL 2 TIMES DAILY
Qty: 14 TABLET | Refills: 0 | Status: SHIPPED | OUTPATIENT
Start: 2025-07-05 | End: 2025-07-12

## 2025-07-05 NOTE — PROGRESS NOTES
Name: Chel Isabel      : 1960      MRN: [id number]  Encounter Provider: IBETH Gamble  Encounter Date: 2025   Encounter department: [facility]  :  Assessment & Plan  Blepharitis of right upper eyelid, unspecified type    Orders:  •  doxycycline (ADOXA) 100 MG tablet; Take 1 tablet (100 mg total) by mouth 2 (two) times a day for 7 days        History of Present Illness   HPI  Chel Isabel is a 65 y.o. female who presents with 3 to 4 days of right upper eyelid swelling and redness.  Denies visual changes.  She has been using Maxitrol eyedrops with no improvement.      Review of Systems   Constitutional:  Negative for activity change, chills and fever.   Eyes:  Positive for pain. Negative for photophobia, discharge, itching and visual disturbance.          Objective   /82   Pulse 80   Temp 98 °F (36.7 °C) (Temporal)   Resp 18   SpO2 98%      Physical Exam  Vitals reviewed.   Constitutional:       General: She is awake. She is not in acute distress.     Appearance: Normal appearance.     Eyes:      Comments: Right upper eyelid swelling and erythema.  No active drainage.  No visible stye     Skin:     General: Skin is warm and moist.     Neurological:      General: No focal deficit present.      Mental Status: She is alert and oriented to person, place, and time.     Psychiatric:         Behavior: Behavior is cooperative.

## 2025-07-05 NOTE — PATIENT INSTRUCTIONS
Doxycyline twice a day for 7 days   Continue to perform eye hygiene  Follow up with your PCP or ophthalmologist with no improvement    Patient Education     Blepharitis   The Basics   Written by the doctors and editors at [facility]   What is blepharitis? -- Blepharitis is inflammation of the eyelids. It can make the eyelids swell, and make the skin of the eyelids look red or darkened. The symptoms might get better and then come back. But blepharitis rarely causes problems with your vision.  Blepharitis is more common in people who have certain skin conditions, including:   Rosacea - This causes raised bumps on the cheeks, nose, chin, forehead, or eyelids. In people with lighter skin, the bumps and skin of the face usually look red.   Seborrheic dermatitis - This causes scaly or flaky patches and sometimes itching. It usually affects parts of the skin with many oil glands. Dandruff is a mild form of seborrhea. In people with lighter skin, the skin might look red. In people with darker skin, the skin might look dark brown or purple.  What are the symptoms of blepharitis? -- The symptoms include:   Red-looking or darkened skin on the eyelids   Swollen and itchy eyelids   Gritty or burning feeling in the eyes   Red eyes   Crusty, matted eyelashes in the morning   Flaking or scaling of the eyelid skin (picture 1)  Is there a test for blepharitis? -- No. There is no test. But your doctor or nurse should be able to tell if you have it by learning about your symptoms and doing an exam.  Is there anything I can do on my own to feel better? -- Yes. You can:   Put warm, wet pressure on your eyes - Wet a clean washcloth with warm (not hot) water and put it over your eyes. When the washcloth cools, reheat it with warm water and put it back over your eyes. Repeat these steps for 5 minutes, 2 to 4 times a day.   Gently rub your eyelids - Do this right after putting warm, wet pressure on your eyes. Use the washcloth or a clean  fingertip to gently rub your eyelid in small circles.   Wash your eyelids - Use plain warm water or warm water with a drop of baby shampoo on a clean washcloth, gauze pad, or cotton swab. Gently clean any crusty material off of your eyelashes and eyelids. Do not rub hard or you can cause more irritation. You can also use over-the-counter eyelid scrubs and pads.  How is blepharitis treated? -- If the treatments you do on your own do not help, your doctor might prescribe:   An antibiotic cream or ointment to put on your eyelids   Antibiotic pills  All topics are updated as new evidence becomes available and our peer review process is complete.  This topic retrieved from [employer] on: Feb 26, 2024.  Topic 39513 Version 16.0  Release: 32.2.4 - C32.56  © 2024 UpToDate, Inc. and/or its affiliates. All rights reserved.  picture 1: Blepharitis     Blepharitis is an inflammation of the eyelids that causes redness and swelling. The skin of the eyelids might also scale or flake, as in this picture.  Graphic 05171 Version 1.0  Consumer Information Use and Disclaimer   Disclaimer: This generalized information is a limited summary of diagnosis, treatment, and/or medication information. It is not meant to be comprehensive and should be used as a tool to help the user understand and/or assess potential diagnostic and treatment options. It does NOT include all information about conditions, treatments, medications, side effects, or risks that may apply to a specific patient. It is not intended to be medical advice or a substitute for the medical advice, diagnosis, or treatment of a health care provider based on the health care provider's examination and assessment of a patient's specific and unique circumstances. Patients must speak with a health care provider for complete information about their health, medical questions, and treatment options, including any risks or benefits regarding use of medications. This information does not  endorse any treatments or medications as safe, effective, or approved for treating a specific patient. UpToDate, Inc. and its affiliates disclaim any warranty or liability relating to this information or the use thereof.The use of this information is governed by the Terms of Use, available at https://www.[employer].com/en/know/clinical-effectiveness-terms. 2024© UpToDate, Inc. and its affiliates and/or licensors. All rights reserved.  Copyright   © 2024 UpToDate, Inc. and/or its affiliates. All rights reserved.

## 2025-07-07 ENCOUNTER — TELEPHONE (OUTPATIENT)
Age: 65
End: 2025-07-07

## 2025-07-07 NOTE — TELEPHONE ENCOUNTER
Caller: Patient     Doctor: Dr. Carlton     Reason for call: Procedure today but over the weekend was seen in urgent care for her eye and was put on antibiotics.     Needing to see if she is still able to come in or if she needs to reschedule.    Transferred to: Triage nurse

## 2025-08-01 ENCOUNTER — HOSPITAL ENCOUNTER (OUTPATIENT)
Dept: NEUROLOGY | Facility: CLINIC | Age: 65
Discharge: HOME | End: 2025-08-01
Payer: MEDICARE

## 2025-08-01 DIAGNOSIS — G62.9 NEUROPATHY: ICD-10-CM

## 2025-08-01 DIAGNOSIS — M53.3 SACROILIAC JOINT PAIN: ICD-10-CM

## 2025-08-01 DIAGNOSIS — M54.42 CHRONIC BILATERAL LOW BACK PAIN WITH BILATERAL SCIATICA: ICD-10-CM

## 2025-08-01 DIAGNOSIS — M54.41 CHRONIC BILATERAL LOW BACK PAIN WITH BILATERAL SCIATICA: ICD-10-CM

## 2025-08-01 DIAGNOSIS — G89.29 CHRONIC BILATERAL LOW BACK PAIN WITH BILATERAL SCIATICA: ICD-10-CM

## 2025-08-01 PROCEDURE — 95911 NRV CNDJ TEST 9-10 STUDIES: CPT | Performed by: PSYCHIATRY & NEUROLOGY

## 2025-08-01 PROCEDURE — 95886 MUSC TEST DONE W/N TEST COMP: CPT | Performed by: PSYCHIATRY & NEUROLOGY

## 2025-09-03 ENCOUNTER — ESTABLISHED COMPREHENSIVE EXAM (OUTPATIENT)
Dept: URBAN - METROPOLITAN AREA CLINIC 6 | Facility: CLINIC | Age: 65
End: 2025-09-03

## 2025-09-03 DIAGNOSIS — H04.123: ICD-10-CM

## 2025-09-03 DIAGNOSIS — R73.03: ICD-10-CM

## 2025-09-03 DIAGNOSIS — H35.373: ICD-10-CM

## 2025-09-03 DIAGNOSIS — H25.13: ICD-10-CM

## 2025-09-03 PROCEDURE — 92014 COMPRE OPH EXAM EST PT 1/>: CPT

## 2025-09-03 ASSESSMENT — TONOMETRY
OD_IOP_MMHG: 17
OS_IOP_MMHG: 18

## 2025-09-03 ASSESSMENT — VISUAL ACUITY
OD_CC: 20/20-2
OS_CC: 20/20-2
OU_CC: J1+

## (undated) DEVICE — UNDER BUTTOCKS DRAPE W/FLUID CONTROL POUCH: Brand: CONVERTORS

## (undated) DEVICE — INTENDED FOR TISSUE SEPARATION, AND OTHER PROCEDURES THAT REQUIRE A SHARP SURGICAL BLADE TO PUNCTURE OR CUT.: Brand: BARD-PARKER ® CARBON RIB-BACK BLADES

## (undated) DEVICE — GLOVE SRG BIOGEL 6.5

## (undated) DEVICE — CUFF TOURNIQUET 18 X 4 IN QUICK CONNECT DISP 1 BLADDER

## (undated) DEVICE — GLOVE INDICATOR PI UNDERGLOVE SZ 7 BLUE

## (undated) DEVICE — GLOVE SRG BIOGEL 7

## (undated) DEVICE — BETHLEHEM UNIVERSAL MINOR VAG: Brand: CARDINAL HEALTH

## (undated) DEVICE — SUT PROLENE 4-0 PS-2 18 IN 8682H

## (undated) DEVICE — PREMIUM DRY TRAY LF: Brand: MEDLINE INDUSTRIES, INC.

## (undated) DEVICE — MAYO STAND COVER: Brand: CONVERTORS

## (undated) DEVICE — PVC URETHRAL CATHETER: Brand: DOVER

## (undated) DEVICE — WET SKIN PREP TRAY: Brand: MEDLINE INDUSTRIES, INC.

## (undated) DEVICE — BLADE MINI RND TIP ONE SIDE SHARP

## (undated) DEVICE — TISSUE REMOVAL SYSTEM FLUID MANAGEMENT ACCESSORIES: Brand: SYMPHION

## (undated) DEVICE — OCCLUSIVE GAUZE STRIP,3% BISMUTH TRIBROMOPHENATE IN PETROLATUM BLEND: Brand: XEROFORM

## (undated) DEVICE — GAUZE SPONGES,16 PLY: Brand: CURITY

## (undated) DEVICE — GLOVE INDICATOR PI UNDERGLOVE SZ 6.5 BLUE

## (undated) DEVICE — GLOVE PI ULTRA TOUCH SZ.6.5

## (undated) DEVICE — CHLORHEXIDINE 4PCT 4 OZ

## (undated) DEVICE — SPONGE LAP 18 X 18 IN STRL RFD

## (undated) DEVICE — PREP PAD BNS: Brand: CONVERTORS

## (undated) DEVICE — MAXI PAD5.51 X 13.78 IN. (14.0 X 35.0 CM)HEAVYCONTOUREDUNSCENTED: Brand: CURITY

## (undated) DEVICE — STERILE BETHLEHEM PLASTIC HAND: Brand: CARDINAL HEALTH

## (undated) DEVICE — TISSUE REMOVAL SYSTEM RESECTING DEVICE: Brand: SYMPHION

## (undated) RX ORDER — CEPHALEXIN 500 MG/1: 1 CAPSULE ORAL

## (undated) RX ORDER — NEOMYCIN SULFATE, POLYMYXIN B SULFATE AND DEXAMETHASONE SUSPENSION/ DROPS 1; 3.5; 1 MG/ML; MG/ML; [USP'U]/ML: 1 SUSPENSION/ DROPS TOPICAL